# Patient Record
Sex: MALE | Race: WHITE | NOT HISPANIC OR LATINO | Employment: OTHER | URBAN - METROPOLITAN AREA
[De-identification: names, ages, dates, MRNs, and addresses within clinical notes are randomized per-mention and may not be internally consistent; named-entity substitution may affect disease eponyms.]

---

## 2017-10-20 ENCOUNTER — OFFICE VISIT (OUTPATIENT)
Dept: URGENT CARE | Age: 57
End: 2017-10-20
Payer: COMMERCIAL

## 2017-10-20 PROCEDURE — G0382 LEV 3 HOSP TYPE B ED VISIT: HCPCS | Performed by: FAMILY MEDICINE

## 2017-10-21 NOTE — PROGRESS NOTES
Assessment  1  Dog bite of right arm (884 0,E906 0) (O05 998S,J57  0XXA)    Plan  Dog bite of right arm    · Amoxicillin-Pot Clavulanate 875-125 MG Oral Tablet (Augmentin); TAKE 1 TABLET  EVERY 12 HOURS UNTIL GONE    Discussion/Summary  Discussion Summary:   Take Augmentin twice daily for 7 daysdailywound daily with soap and waterfor signs of infectionbite form filled out and faxed to [facility]  Medication Side Effects Reviewed: Possible side effects of new medications were reviewed with the patient/guardian today  Understands and agrees with treatment plan: The treatment plan was reviewed with the patient/guardian  The patient/guardian understands and agrees with the treatment plan   Follow Up Instructions: Follow Up with your Primary Care Provider in 10 days  If your symptoms worsen, go to the nearest [facility] Emergency Department  Chief Complaint  1  Skin Wound  Chief Complaint Free Text Note Form: c/o dog bite to right arm x yesterday AM , after startling dog  reports of cleaning areae with soap and water, tetanus 2013      History of Present Illness  HPI: 60 y/o male presents with dog bite to right forearm  He was running yesterday morning when it was dark out, and a leashed dog was started by him and bit his right forearm  Dog owners information obtained  Dog is up to date on vaccines  Patient's last Td was 2013  He cleaned the wound thoroughly yesterday  Hospital Based Practices Required Assessment:   Pain Assessment   the patient states they do not have pain  Abuse And Domestic Violence Screen    Yes, the patient is safe at home  -- The patient states no one is hurting them  Depression And Suicide Screen  No, the patient has not had thoughts of hurting themself  No, the patient has not felt depressed in the past 7 days  Prefered Language is  english  Review of Systems  Focused-Male:   Constitutional: no fever-- and-- no chills  Integumentary: as noted in HPI     ROS Reviewed:   TATYANA reviewed  Active Problems  1  Cherry angioma (448 1) (I78 1)   2  DM (diabetes mellitus screen) (V77 1) (Z13 1)   3  Encounter for screening for malignant neoplasm of colon (V76 51) (Z12 11)   4  Laboratory examination ordered as part of a routine general medical examination   (V72 62) (Z00 00)   5  Need for prophylactic vaccination and inoculation against influenza (V04 81) (Z23)   6  Neoplasm of scalp, benign (216 4) (D23 4)   7  Screening for hyperlipidemia (V77 91) (Z13 220)   8  Screening PSA (prostate specific antigen) (V76 44) (Z12 5)    Past Medical History  1  History of Blood pressure elevated (401 9) (I10)   2  History of herpes zoster (V12 09) (Z86 19)   3  Need for prophylactic vaccination and inoculation against influenza (V04 81) (Z23)   4  History of Precancerous Melanosis Of The Skin (232 9)  Active Problems And Past Medical History Reviewed: The active problems and past medical history were reviewed and updated today  Family History  Mother    1  Family history of    2  Family history of Leukemia (V16 6)  Father    3  Family history of    4  Family history of Diabetes Mellitus (V18 0)   5  Family history of Hypertension (V17 49)  Family History Reviewed: The family history was reviewed and updated today  Social History   · Being A Social Drinker   · Exercising Regularly   · Never A Smoker  Social History Reviewed: The social history was reviewed and updated today  The social history was reviewed and is unchanged  Surgical History  1  History of Umbilical Hernia Repair  Surgical History Reviewed: The surgical history was reviewed and updated today  Current Meds   1  No Reported Medications Recorded  Medication List Reviewed: The medication list was reviewed and updated today  Allergies  1  Percocet TABS  2  No Known Environmental Allergies   3   No Known Food Allergies    Vitals  Signs   Recorded: [id number] 08:00AM   Temperature: 97 8 F, Temporal  Respiration: 18  Systolic: 481  Diastolic: 90  Height: 5 ft 6 in  Weight: 185 lb   BMI Calculated: 29 86  BSA Calculated: 1 93  O2 Saturation: 99  Pain Scale: 0    Physical Exam    Constitutional   General appearance: No acute distress, well appearing and well nourished  Pulmonary   Respiratory effort: No increased work of breathing or signs of respiratory distress  Auscultation of lungs: Clear to auscultation  Cardiovascular   Auscultation of heart: Normal rate and rhythm, normal S1 and S2, without murmurs  Skin two superfiical puncture wounds noted on right forearm  No erythema or warmth surrounding  No drainage from wounds  No closure required  No lymphangitis  No swelling of arm  Psychiatric   Orientation to person, place and time: Normal     Mood and affect: Normal        Future Appointments    Date/Time Provider Specialty Site   12/04/2017 03:30 PM HARRY Avina   [facility]     Signatures   Electronically signed by : Libra Moreira, [facility]; Oct 20 2017  8:16AM EST                       (Author)    Electronically signed by : Nilda Godwin DO; Oct 20 2017 10:21AM EST                       (Co-author)

## 2017-11-15 ENCOUNTER — GENERIC CONVERSION - ENCOUNTER (OUTPATIENT)
Dept: OTHER | Facility: OTHER | Age: 57
End: 2017-11-15

## 2017-12-04 ENCOUNTER — ALLSCRIPTS OFFICE VISIT (OUTPATIENT)
Dept: OTHER | Facility: OTHER | Age: 57
End: 2017-12-04

## 2018-01-10 NOTE — MISCELLANEOUS
Provider Comments  Provider Comments: The patient did not show up for his appointment today  A message was left for him to reschedule        Signatures   Electronically signed by : HARRY Coreas ; Mar 25 2016  3:07PM EST                       (Author)

## 2018-01-23 VITALS
BODY MASS INDEX: 30.28 KG/M2 | HEIGHT: 66 IN | WEIGHT: 188.38 LBS | HEART RATE: 55 BPM | OXYGEN SATURATION: 97 % | SYSTOLIC BLOOD PRESSURE: 118 MMHG | DIASTOLIC BLOOD PRESSURE: 74 MMHG

## 2018-01-23 NOTE — PROGRESS NOTES
Assessment    1  Encounter for preventive health examination (V70 0) (Z00 00)    Discussion/Summary  health maintenance visit eats an adequate diet Currently, he has an adequate exercise regimen  Prostate cancer screening: the risks and benefits of prostate cancer screening were discussed  Colorectal cancer screening: colorectal cancer screening is current and the next colonoscopy is due 2025  The immunizations will be given as outlined in the orders  The patient was counseled regarding impressions  The treatment plan was reviewed with the patient/guardian  The patient/guardian understands and agrees with the treatment plan      Chief Complaint  Patient presents to office today for a wellness visit  History of Present Illness  HM, Adult Male: The patient is being seen for a health maintenance evaluation  The last health maintenance visit was 2 year(s) ago  Social History: Household members include spouse, 1 daughter(s) and 2 son(s)  He is   Work status: working full time and   The patient has never smoked cigarettes  He reports occasional alcohol use and drinking 5 drinks per week  He is not ready to quit drinking  He has never used illicit drugs  General Health: The patient's health since the last visit is described as good  He has regular dental visits  The patient brushes 2 time(s) a day  He denies vision problems  Vision care includes wearing reading glasses  He denies hearing loss  Hearing is normal  Immunizations status: not up to date The patient needs the following immunization(s): influenza vaccine  Lifestyle:  He consumes a diverse and healthy diet  He is on a diet and is generally adherent  He does not have any weight concerns  He exercises regularly  He exercises 3 or more times per week for 30 or more minutes per session  He does not use tobacco  He consumes alcohol  He reports occasional alcohol use  Alcohol concern:  The patient has no concerns about alcohol abuse  He is not ready to quit drinking  He denies drug use  He has never used illicit drugs  Reproductive health:  the patient is sexually active  Screening: Prostate cancer screening includes last prostate-specific antigen testing 2015  Colorectal cancer screening includes last colonoscopy done 2015  Metabolic screening includes lipid profile performed within the past five years and glucose screening performed 2015  (had labs done for wellness program at work-normal sugar, cholesterol)  Cardiovascular risk factors: no hypertension, no diabetes, no obesity and no tobacco use  Review of Systems    Constitutional: no fever, no recent weight gain, no chills and no recent weight loss  Cardiovascular: no chest pain and no palpitations  Respiratory: no shortness of breath and no cough  Gastrointestinal: no abdominal pain, no constipation and no diarrhea  Genitourinary: no nocturia  The patient presents with complaints of left knee arthralgias  Integumentary: recent basal cell removed on the scalp  Active Problems    1  Cherry angioma (228 01) (D18 01)   2  DM (diabetes mellitus screen) (V77 1) (Z13 1)   3  Encounter for screening for malignant neoplasm of colon (V76 51) (Z12 11)   4  Laboratory examination ordered as part of a routine general medical examination   (V72 62) (Z00 00)   5  Need for prophylactic vaccination and inoculation against influenza (V04 81) (Z23)   6  Neoplasm of scalp, benign (216 4) (D23 4)   7  Screening for hyperlipidemia (V77 91) (Z13 220)   8  Screening PSA (prostate specific antigen) (V76 44) (Z12 5)    Past Medical History    · History of Blood pressure elevated (401 9) (I10)   · History of Dog bite of right arm (884 0,E906 0) (J50 531W,Y27  0XXA)   · History of herpes zoster (V12 09) (Z86 19)   · Need for prophylactic vaccination and inoculation against influenza (V04 81) (Z23)   · History of Precancerous Melanosis Of The Skin (232 9)    Surgical History    · History of Umbilical Hernia Repair    Family History  Mother    · Family history of    · Family history of Leukemia (V16 6)  Father    · Family history of    · Family history of Diabetes Mellitus (V18 0)   · Family history of Hypertension (V17 49)    Social History    · Being A Social Drinker   · Exercising Regularly   · Never A Smoker    Allergies    1  Percocet TABS    2  No Known Environmental Allergies   3  No Known Food Allergies    Vitals   Recorded: [id number] 03:52PM   Heart Rate 55   Systolic 738   Diastolic 74   Height 5 ft 6 in   Weight 188 lb 6 oz   BMI Calculated 30 4   BSA Calculated 1 95   O2 Saturation 97     Physical Exam    Constitutional   General appearance: No acute distress, well appearing and well nourished  Head and Face   Head and face: Normal     Eyes   Conjunctiva and lids: No erythema, swelling or discharge  Ears, Nose, Mouth, and Throat   Otoscopic examination: Tympanic membranes translucent with normal light reflex  Canals patent without erythema  Oropharynx: Normal with no erythema, edema, exudate or lesions  Neck   Neck: Supple, symmetric, trachea midline, no masses  Thyroid: Normal, no thyromegaly  Pulmonary   Respiratory effort: No increased work of breathing or signs of respiratory distress  Auscultation of lungs: Clear to auscultation  Cardiovascular   Auscultation of heart: Normal rate and rhythm, normal S1 and S2, no murmurs  Abdomen   Abdomen: Non-tender, no masses  Lymphatic   Palpation of lymph nodes in neck: No lymphadenopathy  Musculoskeletal   Gait and station: Normal     Skin site of basal cell removed from scalp at vertex is clean     Psychiatric   Orientation to person, place and time: Normal     Mood and affect: Normal        Signatures   Electronically signed by : HARRY Head ; Dec  4 2017  4:12PM EST                       (Author)

## 2018-10-04 ENCOUNTER — OFFICE VISIT (OUTPATIENT)
Dept: INTERNAL MEDICINE CLINIC | Facility: CLINIC | Age: 58
End: 2018-10-04
Payer: COMMERCIAL

## 2018-10-04 VITALS
DIASTOLIC BLOOD PRESSURE: 64 MMHG | WEIGHT: 182.2 LBS | HEART RATE: 61 BPM | SYSTOLIC BLOOD PRESSURE: 124 MMHG | BODY MASS INDEX: 29.41 KG/M2 | OXYGEN SATURATION: 98 %

## 2018-10-04 DIAGNOSIS — Z23 NEED FOR INFLUENZA VACCINATION: ICD-10-CM

## 2018-10-04 DIAGNOSIS — D48.5 NEOPLASM OF UNCERTAIN BEHAVIOR OF SCALP: Primary | ICD-10-CM

## 2018-10-04 PROCEDURE — 90682 RIV4 VACC RECOMBINANT DNA IM: CPT

## 2018-10-04 PROCEDURE — 99213 OFFICE O/P EST LOW 20 MIN: CPT | Performed by: INTERNAL MEDICINE

## 2018-10-04 PROCEDURE — 1036F TOBACCO NON-USER: CPT | Performed by: INTERNAL MEDICINE

## 2018-10-04 PROCEDURE — 90471 IMMUNIZATION ADMIN: CPT

## 2018-10-04 NOTE — PROGRESS NOTES
Assessment/Plan:  Squamous cell vs basal cell CA on scalp  He is already seeing derm in 2 weeks       Problem List Items Addressed This Visit     None      Visit Diagnoses     Neoplasm of uncertain behavior of scalp    -  Primary    Need for influenza vaccination        Relevant Orders    influenza vaccine, 5671-1560, quadrivalent, recombinant, PF, 0 5 mL, for patients 18 yr+ (FLUBLOK) (Completed)            Subjective:      Patient ID: Eriberto Jarvis is a 59 y o  male  HPI   Lump on the right temple that has grown in size over the past 2-3 weeks  He is seeing [facility] in 2 weeks   H/o basal cell cancer on the scalp      The following portions of the patient's history were reviewed and updated as appropriate: allergies, current medications, past family history, past medical history, past social history, past surgical history and problem list     Review of Systems   Skin:        See hpi         Objective:      /64   Pulse 61   Wt 82 6 kg (182 lb 3 2 oz)   SpO2 98%   BMI 29 41 kg/m²          Physical Exam   Constitutional: He is oriented to person, place, and time  He appears well-developed and well-nourished  HENT:   Head: Normocephalic and atraumatic  Right Ear: External ear normal    Left Ear: External ear normal    Mouth/Throat: Oropharynx is clear and moist    Eyes: Conjunctivae are normal    Neck: Neck supple  Cardiovascular: Normal rate, regular rhythm and normal heart sounds  No murmur heard  Pulmonary/Chest: Effort normal and breath sounds normal  No respiratory distress  He has no wheezes  He has no rales  Abdominal: Soft  Bowel sounds are normal  He exhibits no distension and no mass  There is no tenderness  There is no rebound and no guarding  Musculoskeletal: Normal range of motion  Neurological: He is alert and oriented to person, place, and time  Skin: Skin is warm and dry     Round quarter sized irregular mass on the right temple   Psychiatric: He has a normal mood and affect   His behavior is normal  Judgment and thought content normal

## 2019-09-03 ENCOUNTER — OFFICE VISIT (OUTPATIENT)
Dept: INTERNAL MEDICINE CLINIC | Facility: CLINIC | Age: 59
End: 2019-09-03
Payer: COMMERCIAL

## 2019-09-03 VITALS
HEIGHT: 66 IN | WEIGHT: 179 LBS | SYSTOLIC BLOOD PRESSURE: 124 MMHG | DIASTOLIC BLOOD PRESSURE: 64 MMHG | HEART RATE: 61 BPM | BODY MASS INDEX: 28.77 KG/M2 | OXYGEN SATURATION: 98 %

## 2019-09-03 DIAGNOSIS — Z11.59 NEED FOR HEPATITIS C SCREENING TEST: ICD-10-CM

## 2019-09-03 DIAGNOSIS — Z00.00 LABORATORY EXAM ORDERED AS PART OF ROUTINE GENERAL MEDICAL EXAMINATION: ICD-10-CM

## 2019-09-03 DIAGNOSIS — Z12.5 SCREENING PSA (PROSTATE SPECIFIC ANTIGEN): ICD-10-CM

## 2019-09-03 DIAGNOSIS — Z00.00 WELLNESS EXAMINATION: Primary | ICD-10-CM

## 2019-09-03 PROCEDURE — 99396 PREV VISIT EST AGE 40-64: CPT | Performed by: INTERNAL MEDICINE

## 2019-09-03 NOTE — PROGRESS NOTES
Assessment/Plan:  Lump on the chest- pt reassured, suspect lipoma or epidermal cyst  Spasms on the arms- I suspect this is muscular but if they worsen or he develops new symptoms, an MRI would be warranted       Problem List Items Addressed This Visit     None      Visit Diagnoses     Wellness examination    -  Primary    Laboratory exam ordered as part of routine general medical examination        Relevant Orders    CBC and differential    Comprehensive metabolic panel    Lipid panel    Need for hepatitis C screening test        Relevant Orders    Hepatitis C antibody    Screening PSA (prostate specific antigen)        Relevant Orders    PSA, Total Screen            Subjective:      Patient ID: Jimbo Merrill is a 59 y o  male  HPI  Needs a wellness for work  Due for metabolic screening  Up to date with colonoscopy-2015 and 10y repeat recommended  Up to date with eye and dental exams  Regular diet  Regular exercise     The following portions of the patient's history were reviewed and updated as appropriate: allergies, current medications, past medical history, past social history, past surgical history and problem list     Review of Systems   Constitutional: Negative for fatigue, fever and unexpected weight change  HENT: Negative for ear pain, hearing loss, sinus pressure, sinus pain and sore throat  Respiratory: Negative for cough, shortness of breath and wheezing  Cardiovascular: Negative for chest pain, palpitations and leg swelling  Gastrointestinal: Negative for abdominal pain, constipation, diarrhea, nausea and vomiting  Genitourinary: Negative for difficulty urinating, frequency and urgency  Musculoskeletal: Negative for arthralgias and myalgias  A few episodes this month of muscle tightness/spasms in both arms lasting 15 secs  This occurs at rest  No change with activity  Denies neck pain, chest pain, SOB  Worried about MS bec of his family history      Skin:        Small lump under the skin on the middle of the chest   Neurological: Negative for dizziness and headaches  Objective:      /64   Pulse 61   Ht 5' 6" (1 676 m)   Wt 81 2 kg (179 lb)   SpO2 98%   BMI 28 89 kg/m²          Physical Exam   Constitutional: He is oriented to person, place, and time  He appears well-developed and well-nourished  HENT:   Head: Normocephalic and atraumatic  Right Ear: External ear normal    Left Ear: External ear normal    Mouth/Throat: Oropharynx is clear and moist    Eyes: Conjunctivae are normal    Neck: Neck supple  Cardiovascular: Normal rate, regular rhythm and normal heart sounds  No murmur heard  Pulmonary/Chest: Effort normal and breath sounds normal  No respiratory distress  He has no wheezes  He has no rales  Abdominal: Soft  Bowel sounds are normal  He exhibits no distension and no mass  There is no tenderness  There is no rebound and no guarding  Musculoskeletal: Normal range of motion  Neurological: He is alert and oriented to person, place, and time  5/5 in both UE   Skin: Skin is warm and dry  Cherry angiomas on the chest  Soft subdermal mass about 1cm on the center of the chest close to the tip of the sternum   Psychiatric: He has a normal mood and affect   His behavior is normal  Judgment and thought content normal

## 2019-10-24 LAB
ALBUMIN SERPL-MCNC: 4.3 G/DL (ref 3.6–5.1)
ALBUMIN/GLOB SERPL: 1.9 (CALC) (ref 1–2.5)
ALP SERPL-CCNC: 52 U/L (ref 40–115)
ALT SERPL-CCNC: 20 U/L (ref 9–46)
AST SERPL-CCNC: 26 U/L (ref 10–35)
BASOPHILS # BLD AUTO: 42 CELLS/UL (ref 0–200)
BASOPHILS NFR BLD AUTO: 0.8 %
BILIRUB SERPL-MCNC: 0.8 MG/DL (ref 0.2–1.2)
BUN SERPL-MCNC: 17 MG/DL (ref 7–25)
BUN/CREAT SERPL: NORMAL (CALC) (ref 6–22)
CALCIUM SERPL-MCNC: 9.7 MG/DL (ref 8.6–10.3)
CHLORIDE SERPL-SCNC: 103 MMOL/L (ref 98–110)
CHOLEST SERPL-MCNC: 191 MG/DL
CHOLEST/HDLC SERPL: 3.3 (CALC)
CO2 SERPL-SCNC: 30 MMOL/L (ref 20–32)
CREAT SERPL-MCNC: 1.12 MG/DL (ref 0.7–1.25)
EOSINOPHIL # BLD AUTO: 198 CELLS/UL (ref 15–500)
EOSINOPHIL NFR BLD AUTO: 3.8 %
ERYTHROCYTE [DISTWIDTH] IN BLOOD BY AUTOMATED COUNT: 12.9 % (ref 11–15)
GLOBULIN SER CALC-MCNC: 2.3 G/DL (CALC) (ref 1.9–3.7)
GLUCOSE SERPL-MCNC: 86 MG/DL (ref 65–99)
HCT VFR BLD AUTO: 46.8 % (ref 38.5–50)
HCV AB S/CO SERPL IA: 0.04
HCV AB SERPL QL IA: NORMAL
HDLC SERPL-MCNC: 58 MG/DL
HGB BLD-MCNC: 15.1 G/DL (ref 13.2–17.1)
LDLC SERPL CALC-MCNC: 119 MG/DL (CALC)
LYMPHOCYTES # BLD AUTO: 1544 CELLS/UL (ref 850–3900)
LYMPHOCYTES NFR BLD AUTO: 29.7 %
MCH RBC QN AUTO: 29.3 PG (ref 27–33)
MCHC RBC AUTO-ENTMCNC: 32.3 G/DL (ref 32–36)
MCV RBC AUTO: 90.9 FL (ref 80–100)
MONOCYTES # BLD AUTO: 650 CELLS/UL (ref 200–950)
MONOCYTES NFR BLD AUTO: 12.5 %
NEUTROPHILS # BLD AUTO: 2766 CELLS/UL (ref 1500–7800)
NEUTROPHILS NFR BLD AUTO: 53.2 %
NONHDLC SERPL-MCNC: 133 MG/DL (CALC)
PLATELET # BLD AUTO: 212 THOUSAND/UL (ref 140–400)
PMV BLD REES-ECKER: 12.4 FL (ref 7.5–12.5)
POTASSIUM SERPL-SCNC: 4.3 MMOL/L (ref 3.5–5.3)
PROT SERPL-MCNC: 6.6 G/DL (ref 6.1–8.1)
PSA SERPL-MCNC: 0.9 NG/ML
RBC # BLD AUTO: 5.15 MILLION/UL (ref 4.2–5.8)
SL AMB EGFR AFRICAN AMERICAN: 82 ML/MIN/1.73M2
SL AMB EGFR NON AFRICAN AMERICAN: 71 ML/MIN/1.73M2
SODIUM SERPL-SCNC: 139 MMOL/L (ref 135–146)
TRIGL SERPL-MCNC: 57 MG/DL
WBC # BLD AUTO: 5.2 THOUSAND/UL (ref 3.8–10.8)

## 2019-12-07 ENCOUNTER — OFFICE VISIT (OUTPATIENT)
Dept: URGENT CARE | Facility: CLINIC | Age: 59
End: 2019-12-07
Payer: COMMERCIAL

## 2019-12-07 VITALS
BODY MASS INDEX: 28.12 KG/M2 | RESPIRATION RATE: 16 BRPM | DIASTOLIC BLOOD PRESSURE: 88 MMHG | OXYGEN SATURATION: 99 % | HEIGHT: 66 IN | HEART RATE: 88 BPM | SYSTOLIC BLOOD PRESSURE: 140 MMHG | TEMPERATURE: 97.2 F | WEIGHT: 175 LBS

## 2019-12-07 DIAGNOSIS — M54.32 LEFT SIDED SCIATICA: Primary | ICD-10-CM

## 2019-12-07 PROCEDURE — 99213 OFFICE O/P EST LOW 20 MIN: CPT | Performed by: NURSE PRACTITIONER

## 2019-12-07 RX ORDER — CYCLOBENZAPRINE HCL 10 MG
10 TABLET ORAL 3 TIMES DAILY PRN
Qty: 20 TABLET | Refills: 0 | Status: SHIPPED | OUTPATIENT
Start: 2019-12-07 | End: 2019-12-11 | Stop reason: HOSPADM

## 2019-12-07 RX ORDER — PREDNISONE 20 MG/1
20 TABLET ORAL 2 TIMES DAILY WITH MEALS
Qty: 14 TABLET | Refills: 0 | Status: SHIPPED | OUTPATIENT
Start: 2019-12-07 | End: 2019-12-11 | Stop reason: HOSPADM

## 2019-12-07 NOTE — PATIENT INSTRUCTIONS
Alternate ice and heat- 20 minutes on, 20 minutes off  Prednisone 20mg twice daily with food for next 7 days and then as needed  Flexeril 10mg every 8 hours as needed  Do not drive or operate machinery while taking this medication  Gentle stretching exercises  If symptoms persist or worsen, consider physical therapy as discussed  Follow up with PCP in 3-5 days if symptoms are not improving

## 2019-12-07 NOTE — PROGRESS NOTES
[id number] [email or web address]        NAME: Frank Doherty is a 62 y o  male  : 1960    MRN: [id number]  DATE: 2019  TIME: 11:30 AM    Assessment and Plan   1  Left sided sciatica  - predniSONE 20 mg tablet; Take 1 tablet (20 mg total) by mouth 2 (two) times a day with meals for 7 days  Dispense: 14 tablet; Refill: 0  - cyclobenzaprine (FLEXERIL) 10 mg tablet; Take 1 tablet (10 mg total) by mouth 3 (three) times a day as needed for muscle spasms  Dispense: 20 tablet; Refill: 0        Patient Instructions   Alternate ice and heat- 20 minutes on, 20 minutes off  Prednisone 20mg twice daily with food for next 7 days and then as needed  Flexeril 10mg every 8 hours as needed  Do not drive or operate machinery while taking this medication  Gentle stretching exercises  If symptoms persist or worsen, consider physical therapy as discussed  Follow up with PCP in 3-5 days if symptoms are not improving  Chief Complaint     Chief Complaint   Patient presents with    Hip Pain     /buttocks pain x 1 day going down the leg, took advil         History of Present Illness       Here for possible sciatica   Started yesterday  No known injury  Pain from left hip to left lower leg/ankle  Denies numbness or tingling  Took advil  Review of Systems   Review of Systems   Genitourinary: Negative for dysuria and flank pain  Musculoskeletal: Positive for arthralgias  Pain left hip/buttocks into left leg   Skin: Negative for rash and wound  Neurological: Negative for numbness  Current Medications     No current outpatient medications on file      Current Allergies     Allergies as of 2019 - Reviewed 2019   Allergen Reaction Noted    Oxycodone-acetaminophen Nausea Only 2012            The following portions of the patient's history were reviewed and updated as appropriate: allergies, current medications, past family history, past medical history, past social history, past surgical history and problem list      Past Medical History:   Diagnosis Date    Elevated blood pressure reading     Last assessed: 3/24/15       Past Surgical History:   Procedure Laterality Date    KNEE ARTHROSCOPY Left     UMBILICAL HERNIA REPAIR  1985       Family History   Problem Relation Age of Onset    Leukemia Mother     Diabetes Father         Mellitus    Hypertension Father          Medications have been verified  Objective   /88   Pulse 88   Temp (!) 97 2 °F (36 2 °C)   Resp 16   Ht 5' 6" (1 676 m)   Wt 79 4 kg (175 lb)   SpO2 99%   BMI 28 25 kg/m²        Physical Exam     Physical Exam   Constitutional: He appears well-developed  He appears distressed (visibly uncomfortable  )  Cardiovascular: Normal rate  Pulmonary/Chest: Effort normal    Musculoskeletal: He exhibits tenderness (reproducible radicular pain with palpation of left SI notch)  He exhibits no edema or deformity  No point vertebral tenderness  Negative slr b/l     Neurological: He is alert  Skin: Skin is warm and dry  Vitals reviewed

## 2019-12-10 ENCOUNTER — TELEPHONE (OUTPATIENT)
Dept: INTERNAL MEDICINE CLINIC | Facility: CLINIC | Age: 59
End: 2019-12-10

## 2019-12-10 NOTE — TELEPHONE ENCOUNTER
Pt went to urgent care over the weekend with sciatica pain  He said they gave him a steroid and muscle relaxer but he is still having pain  They told him not to take Advil  He wants to know if there is something you can give him or suggest for the pain? Please advise

## 2019-12-11 DIAGNOSIS — M54.40 ACUTE LOW BACK PAIN WITH SCIATICA, SCIATICA LATERALITY UNSPECIFIED, UNSPECIFIED BACK PAIN LATERALITY: Primary | ICD-10-CM

## 2019-12-11 RX ORDER — NAPROXEN 500 MG/1
500 TABLET ORAL 2 TIMES DAILY WITH MEALS
Qty: 30 TABLET | Refills: 0 | Status: SHIPPED | OUTPATIENT
Start: 2019-12-11 | End: 2019-12-23 | Stop reason: SDUPTHER

## 2019-12-11 NOTE — TELEPHONE ENCOUNTER
Advised pt he said it's just the pain no other symptoms - requested I send him the directions on what to do in a letter

## 2019-12-11 NOTE — TELEPHONE ENCOUNTER
If he has not had any relief at all, he can stop the prednisone and the muscle relaxant and start Naproxen which is Aleve, 2 tabs twice a day and Tylenol Arthritis 500mg 4 times a day for a week  Other than the pain, any numbness, weakness in the legs, loss of control of the bowels or bladder, fever, chills?

## 2019-12-11 NOTE — TELEPHONE ENCOUNTER
Naproxen sent 500mg twice a day  Please tell him to take with food  Take Tylenol Arthritis 4 times a day  Schedule visit next week if not better

## 2019-12-12 ENCOUNTER — NURSE TRIAGE (OUTPATIENT)
Dept: PHYSICAL THERAPY | Facility: OTHER | Age: 59
End: 2019-12-12

## 2019-12-12 ENCOUNTER — HOSPITAL ENCOUNTER (OUTPATIENT)
Dept: RADIOLOGY | Facility: HOSPITAL | Age: 59
Discharge: HOME | End: 2019-12-12
Payer: COMMERCIAL

## 2019-12-12 ENCOUNTER — OFFICE VISIT (OUTPATIENT)
Dept: INTERNAL MEDICINE CLINIC | Facility: CLINIC | Age: 59
End: 2019-12-12
Payer: COMMERCIAL

## 2019-12-12 VITALS
HEIGHT: 66 IN | DIASTOLIC BLOOD PRESSURE: 100 MMHG | SYSTOLIC BLOOD PRESSURE: 150 MMHG | BODY MASS INDEX: 30.22 KG/M2 | WEIGHT: 188 LBS | HEART RATE: 78 BPM | OXYGEN SATURATION: 98 %

## 2019-12-12 DIAGNOSIS — M54.42 ACUTE LEFT-SIDED LOW BACK PAIN WITH LEFT-SIDED SCIATICA: ICD-10-CM

## 2019-12-12 DIAGNOSIS — M54.42 ACUTE LEFT-SIDED LOW BACK PAIN WITH LEFT-SIDED SCIATICA: Primary | ICD-10-CM

## 2019-12-12 PROCEDURE — 72200 X-RAY EXAM SI JOINTS: CPT

## 2019-12-12 PROCEDURE — 72100 X-RAY EXAM L-S SPINE 2/3 VWS: CPT

## 2019-12-12 PROCEDURE — 3008F BODY MASS INDEX DOCD: CPT | Performed by: INTERNAL MEDICINE

## 2019-12-12 PROCEDURE — 99213 OFFICE O/P EST LOW 20 MIN: CPT | Performed by: INTERNAL MEDICINE

## 2019-12-12 RX ORDER — TRAMADOL HYDROCHLORIDE 50 MG/1
50 TABLET ORAL EVERY 6 HOURS PRN
Qty: 30 TABLET | Refills: 0 | Status: SHIPPED | OUTPATIENT
Start: 2019-12-12 | End: 2019-12-23 | Stop reason: SDUPTHER

## 2019-12-12 NOTE — PROGRESS NOTES
Assessment/Plan:  Obtain Xrays , may need MRI  Stop prednisone, Flexeril  and start Naproxen and tramadol  Appt with [facility] to be scheduled-patient needs to call himself to get triaged  Re: intermittent bilateral arm spasms, we discussed getting an MRI of his brain and cervical spine  We can add this to the Mri of the lumbar spine when we order it for the back pain     Problem List Items Addressed This Visit     None      Visit Diagnoses     Acute left-sided low back pain with left-sided sciatica    -  Primary    Relevant Medications    traMADol (ULTRAM) 50 mg tablet    Other Relevant Orders    Ambulatory referral to Pain Management    XR spine lumbar 2 or 3 views injury            Subjective:      Patient ID: Charo Cohen is a 62 y o  male  HPI  Here with his wife  He started with left sided low back pain last week, 8/10 in severity radiating to the left buttock down the posterior left leg and calf  +tingling in the calf  He went to urgent care and was prescribed prednisone and Flexeril without relief  He has also tried Advil without relief  The morning before this started, he went to the gym like he usually does and di his routine exercises  He had mild discomfort in his back so did not do back exercises  He hobbles around the house, feels better when he is stooping down  He is not comfortable sitting, laying down on his back  Denies incontinence  Denies injuries to the back    The following portions of the patient's history were reviewed and updated as appropriate: allergies, current medications, past family history, past medical history, past social history and problem list     Review of Systems   Constitutional: Negative for chills and fever  Gastrointestinal: Negative for abdominal pain  Denies incontinence   Genitourinary: Negative for difficulty urinating  Denies incontinence   Musculoskeletal: Positive for back pain          Another episode of bilateral arm spasms a few weeks ago lasting a few seconds  He had a few of these episodes in September and not until recently again  Objective:      /100   Pulse 78   Ht 5' 6" (1 676 m)   Wt 85 3 kg (188 lb)   SpO2 98%   BMI 30 34 kg/m²          Physical Exam   Constitutional:   Uncomfortable sitting, laying down (because of the back pain)   Cardiovascular: Normal rate, regular rhythm and normal heart sounds  No murmur heard  Pulmonary/Chest: Effort normal and breath sounds normal  No stridor  No respiratory distress  He has no wheezes  He has no rales  Abdominal: Soft  He exhibits no distension and no mass  There is no tenderness  There is no guarding  Musculoskeletal:   No tenderness over the lumbar spine and SI joints  No tenderness over the hips bilaterally  + straight leg raise on the left side  5/5 strength with hip flexion, knee extension, foot dorsiflexion bilaterally   Neurological:   Abnormal gait  Stooped posture (to relieve pain in the lower back)   Skin: He is not diaphoretic

## 2019-12-12 NOTE — TELEPHONE ENCOUNTER
Background - Initial Assessment  Clinical complaint: acute low back pain radiating down the left side to the calf  Patient states he has no numbness or tingling  Patient states he has no history of back problems  Patient states he has had no injury or reason for the pain  Date of onset: 12/6/19  Frequency of pain: constant  Quality of pain: sharp    Protocols used: [facility] COMPREHENSIVE SPINE PROGRAM PROTOCOL    This RN did review in detail the Comprehensive Spine Program and what we can provide for their back pain  Patient is agreeable to being triaged by this RN and would like to proceed with Physical Therapy  Referral was placed for Physical Therapy at the [facility] site  Patients information was sent to the  to make evaluation appointment  Patient informed that the PT office will be calling to schedule the appointment  Patient made aware per protocol, a referral would be entered to [facility] based on the triage intake assessment questions  The goal is to take care of patient's emotional well-being in addition to the physical well-being  Patient aware that someone from [address] [place] [address] will reaching out to them with a phone call to discuss options and services if needed  Patient verbalized understanding of referral   Behavioral referral was sent and the patient is aware that they will be receiving a phone call from that office  No further questions and/or concerns were voiced by the patient at this time  Patient states understanding of the referrals that were placed

## 2019-12-16 ENCOUNTER — TELEPHONE (OUTPATIENT)
Dept: INTERNAL MEDICINE CLINIC | Facility: CLINIC | Age: 59
End: 2019-12-16

## 2019-12-16 DIAGNOSIS — M54.42 ACUTE LEFT-SIDED LOW BACK PAIN WITH LEFT-SIDED SCIATICA: Primary | ICD-10-CM

## 2019-12-16 NOTE — TELEPHONE ENCOUNTER
Pt calling in for xray results stating he is still in a good amount of pain (has not worsened or have gotten better since OV); I advised they were not back as of yet and I will call the reading room to have them read - he is asking if you can order an MRI without the results?  Please advise, ty    ** Called reading room  and spoke with Josette Chance she will have them read reports and get them over to you **

## 2019-12-17 ENCOUNTER — EVALUATION (OUTPATIENT)
Dept: PHYSICAL THERAPY | Facility: CLINIC | Age: 59
End: 2019-12-17
Payer: COMMERCIAL

## 2019-12-17 DIAGNOSIS — M54.42 ACUTE LEFT-SIDED LOW BACK PAIN WITH LEFT-SIDED SCIATICA: Primary | ICD-10-CM

## 2019-12-17 PROCEDURE — 97110 THERAPEUTIC EXERCISES: CPT | Performed by: PHYSICAL THERAPIST

## 2019-12-17 PROCEDURE — 97161 PT EVAL LOW COMPLEX 20 MIN: CPT | Performed by: PHYSICAL THERAPIST

## 2019-12-17 PROCEDURE — 97535 SELF CARE MNGMENT TRAINING: CPT | Performed by: PHYSICAL THERAPIST

## 2019-12-17 NOTE — PROGRESS NOTES
PT Evaluation     Today's date: 2019  Patient name: Clarisse Burrell  : 1960  MRN: [id number]  Referring provider: Michaela Luo MD  Dx:   Encounter Diagnosis     ICD-10-CM    1  Acute left-sided low back pain with left-sided sciatica M54 42 Ambulatory referral to Pain Management                  Assessment  Assessment details: The patient presents with s/s consistent with acute LBP with a flexion and mobility preference  The patient demonstrates impairments including limited lumbar ROM, poor core strength and endurance, vertebral hypomobility, and pain with ADLs  The patient would benefit from skilled PT to address his deficits and meet his goals  Impairments: abnormal or restricted ROM, impaired physical strength and pain with function  Understanding of Dx/Px/POC: good   Prognosis: good    Goals  STG: To be completed in 4 weeks  1  The patient will report no more than 4/10 pain during all adls  2  The patient will increase lumbar flexion to fingertips 30 cm from the floor when picking an object off the floor  3  The patient is compliant with his HEP  LTG: To be completed in 8 weeks    1  The patient will report no more than 1/10 pain during all adls  2  The patient will increase lumbar flexion to fingertips 10 cm from the floor when picking an object off the floor  3  The patient will increase LE strength to 4+/5 MMT when ambulating more than 30 minutes         Plan  Patient would benefit from: skilled physical therapy  Planned modality interventions: low level laser therapy  Planned therapy interventions: joint mobilization, manual therapy, neuromuscular re-education, patient education, postural training, self care, strengthening, stretching, therapeutic activities, therapeutic exercise, therapeutic training and home exercise program  Frequency: 2x week  Duration in weeks: 8  Plan of Care beginning date: 2019  Plan of Care expiration date: 2020  Treatment plan discussed with: patient        Subjective Evaluation    History of Present Illness  Date of onset: 2019  Mechanism of injury: Kenyon Guzman is a 62 y o  male who presents with acute LBP with left LE pain in the lateral hip and down into the calf  The patient reports parasthesias in the calf but denies trouble sleeping at night  He notes the pain in his calf gets worse as the day progresses  The patient currently struggles with getting out of the bed in the morning and sitting for more than 30 minutes  PMH is insignificant               Not a recurrent problem   Pain  Current pain ratin  At best pain ratin  At worst pain ratin  Quality: burning, sharp and throbbing  Relieving factors: ice and medications  Aggravating factors: standing  Progression: improved    Social Support    Employment status: working  Treatments  Previous treatment: medication  Current treatment: physical therapy  Patient Goals  Patient goals for therapy: decreased pain and increased motion          Objective     Active Range of Motion     Lumbar   Extension: 15 degrees  with pain    Additional Active Range of Motion Details  Lumbar:  Flexion- fingertips 40 cm from the floor  R Lateral flexion- fingertips 55 cm from the floor*  L Lateral flexion- fingertips 48 cm from the floor    *Pain noted  Mechanical Assessment    Cervical      Thoracic      Lumbar    Standing flexion: repeated movements   Pain intensity: better  Pain level: decreased  Sitting flexion: sustained positions  Pain location: centralized  Pain level: decreased  Standing extension: repeated movements  Pain location: peripheralized  Pain intensity: worse  Pain level: increased    Strength/Myotome Testing     Left Hip   Planes of Motion   Flexion: 4  Abduction: 4-  Adduction: 4-    Right Hip   Planes of Motion   Flexion: 4+  Abduction: 4-  Adduction: 4-    Left Knee   Flexion: 4  Extension: 5    Right Knee   Flexion: 5  Extension: 5    Left Ankle/Foot   Dorsiflexion: 5  Plantar flexion: 4-    Right Ankle/Foot   Dorsiflexion: 5  Plantar flexion: 4    Muscle Activation     Additional Muscle Activation Details  Abd: 4/5 MMT    Tests     Lumbar     Left   Negative crossed SLR, passive SLR and quadrant  Right   Negative crossed SLR, passive SLR and quadrant  Left Hip   Positive WOLFGANG  Negative FADIR  Right Hip   Negative WOLFGANG and FADIR         Flowsheet Rows      Most Recent Value   PT/OT G-Codes   Current Score  52   Projected Score  74             Precautions: none    Dx: Acute LBP with flexion and mobility preference      Manual              Laser: Lumbar                                                                     Exercise Diary  12/17            PPT 5"x10            SKC 20"x4            DKTC 20"x4            Piriformis St  20"x4            Seated Lumbar Flex St  20"x4            Marches with PPT             Clamshells             Reverse pball c PPT                                                                                                                                                                             Modalities

## 2019-12-19 ENCOUNTER — HOSPITAL ENCOUNTER (OUTPATIENT)
Dept: MRI IMAGING | Facility: HOSPITAL | Age: 59
Discharge: HOME | End: 2019-12-19
Payer: COMMERCIAL

## 2019-12-19 DIAGNOSIS — M54.42 ACUTE LEFT-SIDED LOW BACK PAIN WITH LEFT-SIDED SCIATICA: ICD-10-CM

## 2019-12-19 PROCEDURE — 72148 MRI LUMBAR SPINE W/O DYE: CPT

## 2019-12-20 ENCOUNTER — TELEPHONE (OUTPATIENT)
Dept: INTERNAL MEDICINE CLINIC | Facility: CLINIC | Age: 59
End: 2019-12-20

## 2019-12-20 DIAGNOSIS — R25.2: Primary | ICD-10-CM

## 2019-12-20 NOTE — TELEPHONE ENCOUNTER
Actually, it is brain and cervical spine  I do not think he could have had 3 MRIs at the same time anyway  As far as I know, they can only do 2 areas at a time

## 2019-12-20 NOTE — TELEPHONE ENCOUNTER
Patient completed his MRI of spine yesterday and received his results but he thought you wanted him to do an MRI of brain? His symptoms was having sinultaneous numbness on both arms on 4 or 5 different occasions  He is not have symptoms right now but he thought this was discussed and you had already order the MRI of brain so he could have got both MRIs done yesterday?     Please advise

## 2019-12-20 NOTE — TELEPHONE ENCOUNTER
We spoke about getting an MRI of the brain at his visit but I did not realize he wanted to get that done all at one with the lumbar MRI  I can order it   It will be an MRI with intravenous contrast/dye

## 2019-12-20 NOTE — TELEPHONE ENCOUNTER
The patient said he is okay with holding off on it if you are and just deal with his primary issue which is his spine

## 2019-12-23 ENCOUNTER — TELEPHONE (OUTPATIENT)
Dept: INTERNAL MEDICINE CLINIC | Facility: CLINIC | Age: 59
End: 2019-12-23

## 2019-12-23 DIAGNOSIS — M54.40 ACUTE LOW BACK PAIN WITH SCIATICA, SCIATICA LATERALITY UNSPECIFIED, UNSPECIFIED BACK PAIN LATERALITY: ICD-10-CM

## 2019-12-23 DIAGNOSIS — M54.42 ACUTE LEFT-SIDED LOW BACK PAIN WITH LEFT-SIDED SCIATICA: ICD-10-CM

## 2019-12-23 RX ORDER — TRAMADOL HYDROCHLORIDE 50 MG/1
50 TABLET ORAL EVERY 6 HOURS PRN
Qty: 30 TABLET | Refills: 0 | Status: SHIPPED | OUTPATIENT
Start: 2019-12-23 | End: 2020-02-26 | Stop reason: ALTCHOICE

## 2019-12-23 RX ORDER — NAPROXEN 500 MG/1
500 TABLET ORAL 2 TIMES DAILY WITH MEALS
Qty: 30 TABLET | Refills: 0 | Status: SHIPPED | OUTPATIENT
Start: 2019-12-23 | End: 2020-02-26 | Stop reason: ALTCHOICE

## 2019-12-23 NOTE — TELEPHONE ENCOUNTER
Prescriptions    Filled  ID  Written  Drug  QTY  Days  Prescriber  Rx #  Pharmacy *  Refills  Daily Dose  Pymt Type      12/12/2019  1  12/12/2019  TRAMADOL HCL 50 MG TABLET  30 0  7  LE DAVID  [id number]  GIANT ([id number])  0  21 43 MME  Comm Ins  [place]

## 2019-12-23 NOTE — TELEPHONE ENCOUNTER
Pt wants to speak to you directly regarding his sciatica pain  He sees pain management tomorrow but would like to speak to you first  Please call pt

## 2019-12-23 NOTE — TELEPHONE ENCOUNTER
Spoke with his wife in the office  He was doing better then the pain returned today more in the left calf and knee  Rx for naproxen and tramadol sent  He was supposed to come in today but I will see him tomorrow morning instead before he goes to PT  Maybe a DVT?  He has no swelling, redness but will assess tomorrow

## 2019-12-24 ENCOUNTER — OFFICE VISIT (OUTPATIENT)
Dept: PHYSICAL THERAPY | Facility: CLINIC | Age: 59
End: 2019-12-24
Payer: COMMERCIAL

## 2019-12-24 ENCOUNTER — OFFICE VISIT (OUTPATIENT)
Dept: INTERNAL MEDICINE CLINIC | Facility: CLINIC | Age: 59
End: 2019-12-24
Payer: COMMERCIAL

## 2019-12-24 VITALS
HEART RATE: 79 BPM | OXYGEN SATURATION: 98 % | DIASTOLIC BLOOD PRESSURE: 82 MMHG | BODY MASS INDEX: 28.7 KG/M2 | SYSTOLIC BLOOD PRESSURE: 152 MMHG | HEIGHT: 66 IN | WEIGHT: 178.6 LBS

## 2019-12-24 DIAGNOSIS — M54.42 ACUTE LEFT-SIDED LOW BACK PAIN WITH LEFT-SIDED SCIATICA: ICD-10-CM

## 2019-12-24 DIAGNOSIS — M54.40 ACUTE LOW BACK PAIN WITH SCIATICA, SCIATICA LATERALITY UNSPECIFIED, UNSPECIFIED BACK PAIN LATERALITY: Primary | ICD-10-CM

## 2019-12-24 DIAGNOSIS — M54.42 ACUTE LEFT-SIDED LOW BACK PAIN WITH LEFT-SIDED SCIATICA: Primary | ICD-10-CM

## 2019-12-24 PROCEDURE — 97140 MANUAL THERAPY 1/> REGIONS: CPT | Performed by: PHYSICAL THERAPIST

## 2019-12-24 PROCEDURE — 97110 THERAPEUTIC EXERCISES: CPT | Performed by: PHYSICAL THERAPIST

## 2019-12-24 PROCEDURE — 1036F TOBACCO NON-USER: CPT | Performed by: INTERNAL MEDICINE

## 2019-12-24 PROCEDURE — 97112 NEUROMUSCULAR REEDUCATION: CPT | Performed by: PHYSICAL THERAPIST

## 2019-12-24 PROCEDURE — 99214 OFFICE O/P EST MOD 30 MIN: CPT | Performed by: INTERNAL MEDICINE

## 2019-12-24 PROCEDURE — 3008F BODY MASS INDEX DOCD: CPT | Performed by: INTERNAL MEDICINE

## 2019-12-24 RX ORDER — GABAPENTIN 100 MG/1
100 CAPSULE ORAL
Qty: 30 CAPSULE | Refills: 1 | Status: SHIPPED | OUTPATIENT
Start: 2019-12-24 | End: 2020-01-07 | Stop reason: SDUPTHER

## 2019-12-24 NOTE — PROGRESS NOTES
Daily Note     Today's date: 2019  Patient name: Loraine Riley  : 1960  MRN: [id number]  Referring provider: Bucky Richey MD  Dx:   Encounter Diagnosis     ICD-10-CM    1  Acute left-sided low back pain with left-sided sciatica M54 42                   Subjective: The patient returns after IE reporting mild pain until Saturday when his pain significantly escalated  He notes minimal to no pain while sitting and 5/10 pain when standing  He also reports burning and tingling in the left calf and medial ankle  Objective: See treatment diary below      Assessment: The patient demonstrated fair tolerance to core strengthening program noting increased calf pain with marches and hamstring stretch  He noted improved calf pain with gastroc stretch and was able to finish the hamstring stretch without increased pain  Added clamshells to the patient's HEP  Plan: Continue per plan of care  Assess patient's response to treatment NV        Precautions: none    Dx: Acute LBP with flexion and mobility preference      Manual             Laser: Lumbar  5400J                                                                   Exercise Diary             PPT 5"x10 5"x15           SKC 20"x4 20"x4           DKTC 20"x4 20"x4           Piriformis St  20"x4 20"x4           Seated Lumbar Flex St  20"x4 20"x4           Marches with PPT  2x10*           Clamshells  RTB 3x10           Reverse pball c PPT  2x10            HS St    15"x3*           Gastroc St    15"x3                                                                                                                                                 Modalities

## 2019-12-24 NOTE — PROGRESS NOTES
Assessment/Plan:  Start gabapentin at night at 100mg and may increase up to 300mg at night  Cont Naproxen and tramadol  Cont PT       Problem List Items Addressed This Visit     None      Visit Diagnoses     Acute low back pain with sciatica, sciatica laterality unspecified, unspecified back pain laterality    -  Primary    Relevant Medications    gabapentin (NEURONTIN) 100 mg capsule    Acute left-sided low back pain with left-sided sciatica        Relevant Medications    gabapentin (NEURONTIN) 100 mg capsule            Subjective:      Patient ID: Enio Jain is a 62 y o  male  HPI  Her with his wife  He was seen 2 weeks ago for severe pain in the left buttock/hip down the back of the right thigh and calf  Xrays and MRI unremarkable  He has been on steroids, NSAIDs, muscle relaxants and tramadol with some relief  He seemed to be doing better over the weekend but got bad again yesterday  He had a PT eval and is going again today  There is a burning feeling in his calf that is constant  There is a tingling in the back of the right thigh that comes and goes  He has these symptoms even at rest when he is lying down  The pain gets worse when he gets up  There is no bowel or bladder incontinence  He denies LE weakness, saddle anesthesia  No LE edema  No DVTs    The following portions of the patient's history were reviewed and updated as appropriate: allergies, past family history, past medical history, past social history, past surgical history and problem list     Review of Systems   Constitutional: Negative for chills and fever  Gastrointestinal: Negative for abdominal pain  Genitourinary: Negative for difficulty urinating  Musculoskeletal: Negative for back pain  See hpi   Skin: Negative for rash  Objective:      /82   Pulse 79   Ht 5' 6" (1 676 m)   Wt 81 kg (178 lb 9 6 oz)   SpO2 98%   BMI 28 83 kg/m²          Physical Exam   Constitutional: No distress     Abdominal: Soft  He exhibits no distension and no mass  There is no tenderness  There is no rebound and no guarding  Musculoskeletal: Normal range of motion  He exhibits no edema, tenderness or deformity  Antalgic gait  5/5 in both LE  -SLR  Slightly limited left hip ROM because of pain, no tenderness at the hips   Skin: He is not diaphoretic

## 2019-12-30 ENCOUNTER — OFFICE VISIT (OUTPATIENT)
Dept: PHYSICAL THERAPY | Facility: CLINIC | Age: 59
End: 2019-12-30
Payer: COMMERCIAL

## 2019-12-30 DIAGNOSIS — M54.42 ACUTE LEFT-SIDED LOW BACK PAIN WITH LEFT-SIDED SCIATICA: Primary | ICD-10-CM

## 2019-12-30 PROCEDURE — 97112 NEUROMUSCULAR REEDUCATION: CPT | Performed by: PHYSICAL THERAPIST

## 2019-12-30 PROCEDURE — 97110 THERAPEUTIC EXERCISES: CPT | Performed by: PHYSICAL THERAPIST

## 2019-12-30 NOTE — PROGRESS NOTES
Daily Note     Today's date: 2019  Patient name: Bella Arguello  : 1960  MRN: [id number]  Referring provider: Vanessa Cervantes MD  Dx:   No diagnosis found  Subjective: The patient reports little to no pain in the back and hip stating most of his symptoms are in the right calf  He currently notes 5/10 calf pain stating the pain worsens as the day progresses  Objective: See treatment diary below      Assessment: The patient demonstrated improved tolerance to core strengthening  No change in symptoms at the end of the treatment; however, patient continues to note calf stretching decreases his pain  Plan: Continue per plan of care  Assess patient's response to treatment NV        Precautions: none    Dx: Acute LBP with flexion and mobility preference      Manual            Laser: Lumbar  5400J 5400J                                                                  Exercise Diary            PPT 5"x10 5"x15 5"x15          SKC 20"x4 20"x4 20"x4          DKTC 20"x4 20"x4 20"x4          Piriformis St  20"x4 20"x4 20"x4          Seated Lumbar Flex St  20"x4 20"x4 20"x4          Marches with PPT  2x10* 2x10          Clamshells  RTB 3x10 RTB 3x10          Reverse pball c PPT  2x10  2x10          HS St    15"x3* np          Gastroc St    15"x3 20"x4                                                                                                                                                Modalities

## 2020-01-03 ENCOUNTER — OFFICE VISIT (OUTPATIENT)
Dept: PHYSICAL THERAPY | Facility: CLINIC | Age: 60
End: 2020-01-03
Payer: COMMERCIAL

## 2020-01-03 DIAGNOSIS — M54.42 ACUTE LEFT-SIDED LOW BACK PAIN WITH LEFT-SIDED SCIATICA: Primary | ICD-10-CM

## 2020-01-03 PROCEDURE — 97110 THERAPEUTIC EXERCISES: CPT | Performed by: PHYSICAL THERAPIST

## 2020-01-03 PROCEDURE — 97112 NEUROMUSCULAR REEDUCATION: CPT | Performed by: PHYSICAL THERAPIST

## 2020-01-03 PROCEDURE — 97140 MANUAL THERAPY 1/> REGIONS: CPT | Performed by: PHYSICAL THERAPIST

## 2020-01-03 NOTE — PROGRESS NOTES
Daily Note     Today's date: 1/3/2020  Patient name: Mornea Mcdonough  : 1960  MRN: [id number]  Referring provider: Prachi Slade MD  Dx:   Encounter Diagnosis     ICD-10-CM    1  Acute left-sided low back pain with left-sided sciatica M54 42                   Subjective: The patient reports no significant changes since last visit  He notes pain mostly in the calf  Objective: See treatment diary below      Assessment: The patient demonstrated positive sciatic nerve test and good tolerance to sciatic nerve glide  Added exercise to HEP  Plan: Continue per plan of care  Assess patient's response to sciatic nerve glide NV        Precautions: none    Dx: Acute LBP with flexion and mobility preference      Manual           Laser: Lumbar  5400J 5400J 5400J                                                                 Exercise Diary           PPT 5"x10 5"x15 5"x15 5"x15         SKC 20"x4 20"x4 20"x4 20"x4         DKTC 20"x4 20"x4 20"x4 20"x4         Piriformis St  20"x4 20"x4 20"x4 20"x4         Seated Lumbar Flex St  20"x4 20"x4 20"x4          Marches with PPT  2x10* 2x10 1x10*         Clamshells  RTB 3x10 RTB 3x10 RTB 3x12         Reverse pball c PPT  2x10  2x10 2x12         HS St    15"x3* np 15"x3         Gastroc St    15"x3 20"x4 20"x4         Seated Sciatic N Glide    2x10                                                                                                                                  Modalities

## 2020-01-07 ENCOUNTER — OFFICE VISIT (OUTPATIENT)
Dept: INTERNAL MEDICINE CLINIC | Facility: CLINIC | Age: 60
End: 2020-01-07
Payer: COMMERCIAL

## 2020-01-07 VITALS
OXYGEN SATURATION: 98 % | SYSTOLIC BLOOD PRESSURE: 126 MMHG | BODY MASS INDEX: 29.47 KG/M2 | WEIGHT: 183.4 LBS | DIASTOLIC BLOOD PRESSURE: 90 MMHG | HEIGHT: 66 IN | HEART RATE: 78 BPM

## 2020-01-07 DIAGNOSIS — R03.0 ELEVATED BLOOD PRESSURE READING WITHOUT DIAGNOSIS OF HYPERTENSION: ICD-10-CM

## 2020-01-07 DIAGNOSIS — M54.40 ACUTE LOW BACK PAIN WITH SCIATICA, SCIATICA LATERALITY UNSPECIFIED, UNSPECIFIED BACK PAIN LATERALITY: Primary | ICD-10-CM

## 2020-01-07 PROCEDURE — 3008F BODY MASS INDEX DOCD: CPT | Performed by: INTERNAL MEDICINE

## 2020-01-07 PROCEDURE — 99214 OFFICE O/P EST MOD 30 MIN: CPT | Performed by: INTERNAL MEDICINE

## 2020-01-07 PROCEDURE — 1036F TOBACCO NON-USER: CPT | Performed by: INTERNAL MEDICINE

## 2020-01-07 RX ORDER — GABAPENTIN 100 MG/1
200 CAPSULE ORAL
Qty: 60 CAPSULE | Refills: 2 | Status: SHIPPED | OUTPATIENT
Start: 2020-01-07 | End: 2020-02-26 | Stop reason: ALTCHOICE

## 2020-01-07 NOTE — PROGRESS NOTES
Assessment/Plan:  Residual left calf pain-Continue PT  Continue gabapentin 200mg HS  Start weaning off Naproxen  Elevated BP- BP better when rechecked  Limit Naproxen use     Problem List Items Addressed This Visit     None      Visit Diagnoses     Acute low back pain with sciatica, sciatica laterality unspecified, unspecified back pain laterality    -  Primary    Relevant Medications    gabapentin (NEURONTIN) 100 mg capsule    Elevated blood pressure reading without diagnosis of hypertension                Subjective:      Patient ID: Caroline Prabhakar is a 62 y o  male  HPI  Severe left sided low back/ hip pain down the left leg that started 4 weeks ago  Degenerative changes on X Rays of lumbar spine and SI joints, MRI also showed multilevel degenerative changes  No response to prednisone, , tramadol, Flexeril  He has been going to PT and taking Naproxen  Gabapentin started 2 weeks ago for burning sensation in the left calf when he walks  He has had significant relief of the burning and the pain in general over the past few weeks and since gabapentin was added   He now only has the burning sensation in the calf 5/10 and mild tingling in the foot  Denies weakness  Denies bowel and bladder incontinence  He is still using a cane    The following portions of the patient's history were reviewed and updated as appropriate: allergies, current medications, past medical history, past social history, past surgical history and problem list     Review of Systems   Constitutional: Negative for chills and fever  HENT: Negative for congestion, rhinorrhea and sinus pressure  Eyes: Negative for visual disturbance  Respiratory: Negative for cough and shortness of breath  Cardiovascular: Negative for chest pain, palpitations and leg swelling  Gastrointestinal: Negative for abdominal pain  Genitourinary: Negative for difficulty urinating  Musculoskeletal: Negative for back pain     Neurological: Negative for dizziness, weakness and numbness  Objective:      /90   Pulse 78   Ht 5' 6" (1 676 m)   Wt 83 2 kg (183 lb 6 4 oz)   SpO2 98%   BMI 29 60 kg/m²          Physical Exam   Constitutional: He is oriented to person, place, and time  No distress  HENT:   Head: Normocephalic and atraumatic  Eyes: Conjunctivae are normal    Cardiovascular: Normal rate, regular rhythm and normal heart sounds  No murmur heard  Pulmonary/Chest: Effort normal and breath sounds normal  No respiratory distress  He has no wheezes  He has no rales  Abdominal: Soft  He exhibits no distension and no mass  There is no tenderness  There is no rebound and no guarding  Musculoskeletal: He exhibits no tenderness (none over the lumbar spine, SI joints, calves; -SLR; 5/5 in both LE)  Neurological: He is alert and oriented to person, place, and time  Skin: Skin is warm and dry  He is not diaphoretic  Psychiatric: He has a normal mood and affect   His behavior is normal  Judgment and thought content normal

## 2020-01-09 ENCOUNTER — OFFICE VISIT (OUTPATIENT)
Dept: PHYSICAL THERAPY | Facility: CLINIC | Age: 60
End: 2020-01-09
Payer: COMMERCIAL

## 2020-01-09 DIAGNOSIS — M54.42 ACUTE LEFT-SIDED LOW BACK PAIN WITH LEFT-SIDED SCIATICA: Primary | ICD-10-CM

## 2020-01-09 PROCEDURE — 97112 NEUROMUSCULAR REEDUCATION: CPT | Performed by: PHYSICAL THERAPIST

## 2020-01-09 PROCEDURE — 97110 THERAPEUTIC EXERCISES: CPT | Performed by: PHYSICAL THERAPIST

## 2020-01-09 NOTE — PROGRESS NOTES
Daily Note     Today's date: 2020  Patient name: Ila Graves  : 1960  MRN: [id number]  Referring provider: Jignesh Patricia MD  Dx:   No diagnosis found  Subjective: The patient reports improvement in symptoms with sciatic nerve glides at home  He notes 1-2/10 hip pain and no LBP today  Objective: See treatment diary below      Assessment: The patient responded well to IASTM to the piriformis  Discharged the marches and added bridges without onset of symptoms  Plan: Continue per plan of care  Assess patient's response to changes NV        Precautions: none    Dx: Acute LBP with flexion and mobility preference      Manual          Laser: Lumbar  5400J 5400J 5400J np        IASTM: L piriformis     10 min        Hip IR/ER PROM     20"x5 ea                                      Exercise Diary          PPT 5"x10 5"x15 5"x15 5"x15 5"x20        SKC 20"x4 20"x4 20"x4 20"x4 20"x4        DKTC 20"x4 20"x4 20"x4 20"x4 20"x3        Piriformis St  20"x4 20"x4 20"x4 20"x4 20"x4        Seated Lumbar Flex St  20"x4 20"x4 20"x4 20"x4 np        Marches with PPT  2x10* 2x10 1x10* np        Clamshells  RTB 3x10 RTB 3x10 RTB 3x12 RTB 3x15        Reverse pball c PPT  2x10  2x10 2x12 2x12        HS St    15"x3* np 15"x3 15"x3        Gastroc St    15"x3 20"x4 20"x4 np        Seated Sciatic N Glide    2x10 3x10        Bridges     2x10                                                                                                                    Modalities

## 2020-01-10 ENCOUNTER — APPOINTMENT (OUTPATIENT)
Dept: PHYSICAL THERAPY | Facility: CLINIC | Age: 60
End: 2020-01-10
Payer: COMMERCIAL

## 2020-01-17 ENCOUNTER — OFFICE VISIT (OUTPATIENT)
Dept: PHYSICAL THERAPY | Facility: CLINIC | Age: 60
End: 2020-01-17
Payer: COMMERCIAL

## 2020-01-17 ENCOUNTER — APPOINTMENT (OUTPATIENT)
Dept: PHYSICAL THERAPY | Facility: CLINIC | Age: 60
End: 2020-01-17
Payer: COMMERCIAL

## 2020-01-17 DIAGNOSIS — M54.42 ACUTE LEFT-SIDED LOW BACK PAIN WITH LEFT-SIDED SCIATICA: Primary | ICD-10-CM

## 2020-01-17 PROCEDURE — 97110 THERAPEUTIC EXERCISES: CPT | Performed by: PHYSICAL THERAPIST

## 2020-01-17 PROCEDURE — 97140 MANUAL THERAPY 1/> REGIONS: CPT | Performed by: PHYSICAL THERAPIST

## 2020-01-17 PROCEDURE — 97112 NEUROMUSCULAR REEDUCATION: CPT | Performed by: PHYSICAL THERAPIST

## 2020-01-17 PROCEDURE — 97530 THERAPEUTIC ACTIVITIES: CPT | Performed by: PHYSICAL THERAPIST

## 2020-01-17 NOTE — PROGRESS NOTES
Daily Note     Today's date: 2020  Patient name: Deatrice Hamman  : 1960  MRN: [id number]  Referring provider: Eugene Ram MD  Dx:   Encounter Diagnosis     ICD-10-CM    1  Acute left-sided low back pain with left-sided sciatica M54 42        Start Time: 0800  Stop Time: 0850  Total time in clinic (min): 50 minutes    Subjective: The patient reports significant improvement in symptoms noting only mild calf pain at the end of the day  He presented without an AD and a more normalized gait today  Objective: See treatment diary below      Assessment: The patient tolerated core progressions well  Improved piriformis tone noted during manuals  Patient still limited in hip IR  Added exercise bike without onset of symptoms  Plan: Continue per plan of care  [facility] to [facility]        Precautions: none    Dx: Acute LBP with flexion and mobility preference      Manual         Laser: Lumbar  5400J 5400J 5400J np np       IASTM: L piriformis     10 min 10 min       Hip IR/ER PROM     20"x5 ea 20"x5 ea                                     Exercise Diary         PPT 5"x10 5"x15 5"x15 5"x15 5"x20 5"x20       SKC 20"x4 20"x4 20"x4 20"x4 20"x4 20"x3       DKTC 20"x4 20"x4 20"x4 20"x4 20"x3 np       Piriformis St  20"x4 20"x4 20"x4 20"x4 20"x4 20"x4       Seated Lumbar Flex St  20"x4 20"x4 20"x4 20"x4 np        Marches with PPT  2x10* 2x10 1x10* np        Clamshells  RTB 3x10 RTB 3x10 RTB 3x12 RTB 3x15 RTB 3x18       Reverse pball c PPT  2x10  2x10 2x12 2x12 3x10       HS St    15"x3* np 15"x3 15"x3 15"x3       Gastroc St    15"x3 20"x4 20"x4 np        Seated Sciatic N Glide    2x10 3x10 3x10       Bridges     2x10 3x10       Bike      10 min                                                                                                      Modalities
[id number] Comprehensive

## 2020-01-28 ENCOUNTER — EVALUATION (OUTPATIENT)
Dept: PHYSICAL THERAPY | Facility: CLINIC | Age: 60
End: 2020-01-28
Payer: COMMERCIAL

## 2020-01-28 DIAGNOSIS — M54.42 ACUTE LEFT-SIDED LOW BACK PAIN WITH LEFT-SIDED SCIATICA: Primary | ICD-10-CM

## 2020-01-28 PROCEDURE — 97112 NEUROMUSCULAR REEDUCATION: CPT | Performed by: PHYSICAL THERAPIST

## 2020-01-28 PROCEDURE — 97140 MANUAL THERAPY 1/> REGIONS: CPT | Performed by: PHYSICAL THERAPIST

## 2020-01-28 PROCEDURE — 97110 THERAPEUTIC EXERCISES: CPT | Performed by: PHYSICAL THERAPIST

## 2020-01-28 NOTE — PROGRESS NOTES
Daily Note     Today's date: 2020  Patient name: Boris Ahumada  : 1960  MRN: [id number]  Referring provider: Yogesh Rojas MD  Dx:   Encounter Diagnosis     ICD-10-CM    1  Acute left-sided low back pain with left-sided sciatica M54 42                   Subjective: The patient reports no pain in the past week and a half  He notes improved paraesthesias in the foot  The patient feels ready for discharge to [facility]  Objective: See treatment diary below      Assessment: The patient demonstrated good tolerance to progressions  Discussed prognosis and prevention and updated his HEP for home  Plan: Patient is discharged from [facility]        Precautions: none    Dx: Acute LBP with flexion and mobility preference      Manual        Laser: Lumbar  5400J 5400J 5400J np np       IASTM: L piriformis     10 min 10 min 10 min      Hip IR/ER PROM     20"x5 ea 20"x5 ea 20"x5 ea                                    Exercise Diary        PPT 5"x10 5"x15 5"x15 5"x15 5"x20 5"x20 5"x20      SKC 20"x4 20"x4 20"x4 20"x4 20"x4 20"x3 20"x3      DKTC 20"x4 20"x4 20"x4 20"x4 20"x3 np       Piriformis St  20"x4 20"x4 20"x4 20"x4 20"x4 20"x4 20"x4      Seated Lumbar Flex St  20"x4 20"x4 20"x4 20"x4 np        Marches with PPT  2x10* 2x10 1x10* np        Clamshells  RTB 3x10 RTB 3x10 RTB 3x12 RTB 3x15 RTB 3x18 GTB 3x12      Reverse pball c PPT  2x10  2x10 2x12 2x12 3x10 3x10      HS St    15"x3* np 15"x3 15"x3 15"x3 20"x3      Gastroc St    15"x3 20"x4 20"x4 np        Seated Sciatic N Glide    2x10 3x10 3x10 3x10      Bridges     2x10 3x10 3x10      Bike      10 min                                                                                                      Modalities

## 2020-02-26 ENCOUNTER — OFFICE VISIT (OUTPATIENT)
Dept: INTERNAL MEDICINE CLINIC | Facility: CLINIC | Age: 60
End: 2020-02-26
Payer: COMMERCIAL

## 2020-02-26 VITALS
BODY MASS INDEX: 29.96 KG/M2 | HEIGHT: 66 IN | OXYGEN SATURATION: 98 % | WEIGHT: 186.4 LBS | SYSTOLIC BLOOD PRESSURE: 120 MMHG | HEART RATE: 68 BPM | DIASTOLIC BLOOD PRESSURE: 64 MMHG

## 2020-02-26 DIAGNOSIS — Z13.220 SCREENING, LIPID: ICD-10-CM

## 2020-02-26 DIAGNOSIS — Z00.00 LABORATORY EXAM ORDERED AS PART OF ROUTINE GENERAL MEDICAL EXAMINATION: ICD-10-CM

## 2020-02-26 DIAGNOSIS — Z12.5 SCREENING PSA (PROSTATE SPECIFIC ANTIGEN): ICD-10-CM

## 2020-02-26 DIAGNOSIS — M54.32 SCIATICA OF LEFT SIDE: Primary | ICD-10-CM

## 2020-02-26 DIAGNOSIS — Z13.1 SCREENING FOR DIABETES MELLITUS: ICD-10-CM

## 2020-02-26 PROCEDURE — 1036F TOBACCO NON-USER: CPT | Performed by: INTERNAL MEDICINE

## 2020-02-26 PROCEDURE — 99213 OFFICE O/P EST LOW 20 MIN: CPT | Performed by: INTERNAL MEDICINE

## 2020-02-26 PROCEDURE — 3008F BODY MASS INDEX DOCD: CPT | Performed by: INTERNAL MEDICINE

## 2020-02-26 NOTE — PROGRESS NOTES
Assessment/Plan:  Sciatica resolved  Back to normal activity  Wellness to be done in November  He will have labs done before the visit  BMI Counseling: Body mass index is 30 09 kg/m²  The BMI is above normal  Exercise recommendations include exercising 3-5 times per week  Problem List Items Addressed This Visit     None      Visit Diagnoses     Sciatica of left side    -  Primary    Screening for diabetes mellitus        Relevant Orders    Comprehensive metabolic panel    Screening PSA (prostate specific antigen)        Relevant Orders    CBC    Screening, lipid        Relevant Orders    Lipid panel    Laboratory exam ordered as part of routine general medical examination        Relevant Orders    PSA, Total Screen    Lipid panel    CBC    Comprehensive metabolic panel            Subjective:      Patient ID: Maryanne Juarez is a 62 y o  male  HPI  He had severe left sided low back/hip pain, left calf pain in December  Xray, MRI revealed degenerative changes  He failed prednisone tramadol and Flexeril   Improved with PT and with gabapentin and Naproxen  Symptoms are compeltely gone , resolved  He denies having any low back pain, numbness in the calves  He is back to exercising in the gym   Also reports 2 episodes of spasms in the UE which have not recurred    The following portions of the patient's history were reviewed and updated as appropriate: allergies, past family history, past medical history, past social history, past surgical history and problem list     Review of Systems   Constitutional: Negative for fatigue, fever and unexpected weight change  HENT: Negative for congestion, sinus pressure, sinus pain and sore throat  Respiratory: Negative for cough, shortness of breath and wheezing  Cardiovascular: Negative for chest pain, palpitations and leg swelling  Gastrointestinal: Negative for abdominal pain, constipation, diarrhea, nausea and vomiting     Genitourinary: Negative for difficulty urinating  Musculoskeletal: Negative for arthralgias, back pain and myalgias  Neurological: Negative for numbness  Objective:      /64   Pulse 68   Ht 5' 6" (1 676 m)   Wt 84 6 kg (186 lb 6 4 oz)   SpO2 98%   BMI 30 09 kg/m²          Physical Exam   Constitutional: He is oriented to person, place, and time  He appears well-developed and well-nourished  HENT:   Head: Normocephalic and atraumatic  Right Ear: External ear normal    Left Ear: External ear normal    Mouth/Throat: Oropharynx is clear and moist    Eyes: Conjunctivae are normal    Neck: Neck supple  Cardiovascular: Normal rate, regular rhythm and normal heart sounds  No murmur heard  Pulmonary/Chest: Effort normal and breath sounds normal  No respiratory distress  He has no wheezes  He has no rales  Abdominal: Soft  He exhibits no distension and no mass  There is no tenderness  There is no rebound and no guarding  Neurological: He is alert and oriented to person, place, and time  Gait normal    5/5 in both LE  -SLR   Skin: Skin is warm and dry  Psychiatric: He has a normal mood and affect   His behavior is normal  Judgment and thought content normal

## 2020-11-10 ENCOUNTER — TELEMEDICINE (OUTPATIENT)
Dept: INTERNAL MEDICINE CLINIC | Facility: CLINIC | Age: 60
End: 2020-11-10
Payer: COMMERCIAL

## 2020-11-10 VITALS — WEIGHT: 178 LBS | BODY MASS INDEX: 28.61 KG/M2 | HEIGHT: 66 IN

## 2020-11-10 DIAGNOSIS — Z20.828 EXPOSURE TO SARS-ASSOCIATED CORONAVIRUS: Primary | ICD-10-CM

## 2020-11-10 PROCEDURE — 99213 OFFICE O/P EST LOW 20 MIN: CPT | Performed by: NURSE PRACTITIONER

## 2020-11-10 RX ORDER — SODIUM FLUORIDE 6 MG/ML
PASTE, DENTIFRICE DENTAL
COMMUNITY
Start: 2020-10-16

## 2020-11-11 DIAGNOSIS — Z20.828 EXPOSURE TO SARS-ASSOCIATED CORONAVIRUS: ICD-10-CM

## 2020-11-11 PROCEDURE — U0003 INFECTIOUS AGENT DETECTION BY NUCLEIC ACID (DNA OR RNA); SEVERE ACUTE RESPIRATORY SYNDROME CORONAVIRUS 2 (SARS-COV-2) (CORONAVIRUS DISEASE [COVID-19]), AMPLIFIED PROBE TECHNIQUE, MAKING USE OF HIGH THROUGHPUT TECHNOLOGIES AS DESCRIBED BY CMS-2020-01-R: HCPCS | Performed by: NURSE PRACTITIONER

## 2020-11-12 PROBLEM — Z20.828 EXPOSURE TO SARS-ASSOCIATED CORONAVIRUS: Status: ACTIVE | Noted: 2020-11-12

## 2020-11-13 ENCOUNTER — TELEPHONE (OUTPATIENT)
Dept: INTERNAL MEDICINE CLINIC | Facility: CLINIC | Age: 60
End: 2020-11-13

## 2020-11-13 LAB — SARS-COV-2 RNA SPEC QL NAA+PROBE: NOT DETECTED

## 2020-12-01 ENCOUNTER — OFFICE VISIT (OUTPATIENT)
Dept: INTERNAL MEDICINE CLINIC | Facility: CLINIC | Age: 60
End: 2020-12-01
Payer: COMMERCIAL

## 2020-12-01 ENCOUNTER — TELEPHONE (OUTPATIENT)
Dept: INTERNAL MEDICINE CLINIC | Facility: CLINIC | Age: 60
End: 2020-12-01

## 2020-12-01 VITALS
OXYGEN SATURATION: 97 % | SYSTOLIC BLOOD PRESSURE: 116 MMHG | DIASTOLIC BLOOD PRESSURE: 74 MMHG | HEART RATE: 50 BPM | HEIGHT: 66 IN | BODY MASS INDEX: 28.93 KG/M2 | WEIGHT: 180 LBS

## 2020-12-01 DIAGNOSIS — Z00.00 LABORATORY EXAM ORDERED AS PART OF ROUTINE GENERAL MEDICAL EXAMINATION: ICD-10-CM

## 2020-12-01 DIAGNOSIS — Z00.00 WELLNESS EXAMINATION: Primary | ICD-10-CM

## 2020-12-01 DIAGNOSIS — Z12.5 SCREENING PSA (PROSTATE SPECIFIC ANTIGEN): ICD-10-CM

## 2020-12-01 PROBLEM — Z20.828 EXPOSURE TO SARS-ASSOCIATED CORONAVIRUS: Status: RESOLVED | Noted: 2020-11-12 | Resolved: 2020-12-01

## 2020-12-01 PROCEDURE — 3008F BODY MASS INDEX DOCD: CPT | Performed by: INTERNAL MEDICINE

## 2020-12-01 PROCEDURE — 99396 PREV VISIT EST AGE 40-64: CPT | Performed by: INTERNAL MEDICINE

## 2020-12-01 PROCEDURE — 1036F TOBACCO NON-USER: CPT | Performed by: INTERNAL MEDICINE

## 2020-12-30 ENCOUNTER — LAB (OUTPATIENT)
Dept: LAB | Age: 60
End: 2020-12-30
Payer: COMMERCIAL

## 2020-12-30 LAB
ALBUMIN SERPL BCP-MCNC: 3.9 G/DL (ref 3.5–5)
ALP SERPL-CCNC: 62 U/L (ref 46–116)
ALT SERPL W P-5'-P-CCNC: 27 U/L (ref 12–78)
ANION GAP SERPL CALCULATED.3IONS-SCNC: 4 MMOL/L (ref 4–13)
AST SERPL W P-5'-P-CCNC: 28 U/L (ref 5–45)
BASOPHILS # BLD AUTO: 0.04 THOUSANDS/ΜL (ref 0–0.1)
BASOPHILS NFR BLD AUTO: 1 % (ref 0–1)
BILIRUB SERPL-MCNC: 0.7 MG/DL (ref 0.2–1)
BUN SERPL-MCNC: 19 MG/DL (ref 5–25)
CALCIUM SERPL-MCNC: 9.5 MG/DL (ref 8.3–10.1)
CHLORIDE SERPL-SCNC: 109 MMOL/L (ref 100–108)
CHOLEST SERPL-MCNC: 198 MG/DL (ref 50–200)
CO2 SERPL-SCNC: 30 MMOL/L (ref 21–32)
CREAT SERPL-MCNC: 0.94 MG/DL (ref 0.6–1.3)
EOSINOPHIL # BLD AUTO: 0.18 THOUSAND/ΜL (ref 0–0.61)
EOSINOPHIL NFR BLD AUTO: 3 % (ref 0–6)
ERYTHROCYTE [DISTWIDTH] IN BLOOD BY AUTOMATED COUNT: 13.3 % (ref 11.6–15.1)
GFR SERPL CREATININE-BSD FRML MDRD: 87 ML/MIN/1.73SQ M
GLUCOSE P FAST SERPL-MCNC: 83 MG/DL (ref 65–99)
HCT VFR BLD AUTO: 46.5 % (ref 36.5–49.3)
HDLC SERPL-MCNC: 62 MG/DL
HGB BLD-MCNC: 15 G/DL (ref 12–17)
IMM GRANULOCYTES # BLD AUTO: 0.01 THOUSAND/UL (ref 0–0.2)
IMM GRANULOCYTES NFR BLD AUTO: 0 % (ref 0–2)
LDLC SERPL CALC-MCNC: 126 MG/DL (ref 0–100)
LYMPHOCYTES # BLD AUTO: 1.32 THOUSANDS/ΜL (ref 0.6–4.47)
LYMPHOCYTES NFR BLD AUTO: 23 % (ref 14–44)
MCH RBC QN AUTO: 29.6 PG (ref 26.8–34.3)
MCHC RBC AUTO-ENTMCNC: 32.3 G/DL (ref 31.4–37.4)
MCV RBC AUTO: 92 FL (ref 82–98)
MONOCYTES # BLD AUTO: 0.78 THOUSAND/ΜL (ref 0.17–1.22)
MONOCYTES NFR BLD AUTO: 13 % (ref 4–12)
NEUTROPHILS # BLD AUTO: 3.5 THOUSANDS/ΜL (ref 1.85–7.62)
NEUTS SEG NFR BLD AUTO: 60 % (ref 43–75)
NRBC BLD AUTO-RTO: 0 /100 WBCS
PLATELET # BLD AUTO: 189 THOUSANDS/UL (ref 149–390)
PMV BLD AUTO: 11.8 FL (ref 8.9–12.7)
POTASSIUM SERPL-SCNC: 4.4 MMOL/L (ref 3.5–5.3)
PROT SERPL-MCNC: 7.1 G/DL (ref 6.4–8.2)
PSA SERPL-MCNC: 0.9 NG/ML (ref 0–4)
RBC # BLD AUTO: 5.07 MILLION/UL (ref 3.88–5.62)
SODIUM SERPL-SCNC: 143 MMOL/L (ref 136–145)
TRIGL SERPL-MCNC: 50 MG/DL
WBC # BLD AUTO: 5.83 THOUSAND/UL (ref 4.31–10.16)

## 2020-12-30 PROCEDURE — G0103 PSA SCREENING: HCPCS | Performed by: INTERNAL MEDICINE

## 2020-12-30 PROCEDURE — 80053 COMPREHEN METABOLIC PANEL: CPT | Performed by: INTERNAL MEDICINE

## 2020-12-30 PROCEDURE — 80061 LIPID PANEL: CPT | Performed by: INTERNAL MEDICINE

## 2020-12-30 PROCEDURE — 36415 COLL VENOUS BLD VENIPUNCTURE: CPT | Performed by: INTERNAL MEDICINE

## 2020-12-30 PROCEDURE — 85025 COMPLETE CBC W/AUTO DIFF WBC: CPT | Performed by: INTERNAL MEDICINE

## 2021-03-30 ENCOUNTER — IMMUNIZATIONS (OUTPATIENT)
Dept: FAMILY MEDICINE CLINIC | Facility: HOSPITAL | Age: 61
End: 2021-03-30

## 2021-03-30 DIAGNOSIS — Z23 ENCOUNTER FOR IMMUNIZATION: Primary | ICD-10-CM

## 2021-03-30 PROCEDURE — 0001A SARS-COV-2 / COVID-19 MRNA VACCINE (PFIZER-BIONTECH) 30 MCG: CPT

## 2021-03-30 PROCEDURE — 91300 SARS-COV-2 / COVID-19 MRNA VACCINE (PFIZER-BIONTECH) 30 MCG: CPT

## 2021-04-23 ENCOUNTER — IMMUNIZATIONS (OUTPATIENT)
Dept: FAMILY MEDICINE CLINIC | Facility: HOSPITAL | Age: 61
End: 2021-04-23

## 2021-04-23 DIAGNOSIS — Z23 ENCOUNTER FOR IMMUNIZATION: Primary | ICD-10-CM

## 2021-04-23 PROCEDURE — 91300 SARS-COV-2 / COVID-19 MRNA VACCINE (PFIZER-BIONTECH) 30 MCG: CPT

## 2021-04-23 PROCEDURE — 0002A SARS-COV-2 / COVID-19 MRNA VACCINE (PFIZER-BIONTECH) 30 MCG: CPT

## 2021-06-10 ENCOUNTER — CLINICAL SUPPORT (OUTPATIENT)
Dept: INTERNAL MEDICINE CLINIC | Facility: CLINIC | Age: 61
End: 2021-06-10
Payer: COMMERCIAL

## 2021-06-10 DIAGNOSIS — Z23 NEED FOR VACCINATION: Primary | ICD-10-CM

## 2021-06-10 PROCEDURE — 90750 HZV VACC RECOMBINANT IM: CPT

## 2021-06-10 PROCEDURE — 90471 IMMUNIZATION ADMIN: CPT

## 2021-09-22 ENCOUNTER — CLINICAL SUPPORT (OUTPATIENT)
Dept: INTERNAL MEDICINE CLINIC | Facility: CLINIC | Age: 61
End: 2021-09-22
Payer: COMMERCIAL

## 2021-09-22 DIAGNOSIS — Z23 NEED FOR ZOSTER VACCINATION: Primary | ICD-10-CM

## 2021-09-22 PROCEDURE — 90750 HZV VACC RECOMBINANT IM: CPT

## 2021-09-22 PROCEDURE — 90471 IMMUNIZATION ADMIN: CPT

## 2021-09-22 NOTE — PROGRESS NOTES
Assessment/Plan:    No problem-specific Assessment & Plan notes found for this encounter  Diagnoses and all orders for this visit:    Need for zoster vaccination  -     Zoster Vaccine Recombinant IM          Subjective:      Patient ID: Len Tapia is a 59 y o  male  HPI        Review of Systems      Objective: There were no vitals taken for this visit           Physical Exam

## 2021-12-02 ENCOUNTER — OFFICE VISIT (OUTPATIENT)
Dept: INTERNAL MEDICINE CLINIC | Facility: CLINIC | Age: 61
End: 2021-12-02
Payer: COMMERCIAL

## 2021-12-02 VITALS
SYSTOLIC BLOOD PRESSURE: 112 MMHG | WEIGHT: 176.6 LBS | TEMPERATURE: 97.2 F | RESPIRATION RATE: 16 BRPM | DIASTOLIC BLOOD PRESSURE: 72 MMHG | HEART RATE: 68 BPM | OXYGEN SATURATION: 97 % | HEIGHT: 66 IN | BODY MASS INDEX: 28.38 KG/M2

## 2021-12-02 DIAGNOSIS — Z00.00 ANNUAL PHYSICAL EXAM: Primary | ICD-10-CM

## 2021-12-02 DIAGNOSIS — R20.2 PARESTHESIA OF LEFT LEG: ICD-10-CM

## 2021-12-02 DIAGNOSIS — Z00.00 LABORATORY EXAM ORDERED AS PART OF ROUTINE GENERAL MEDICAL EXAMINATION: ICD-10-CM

## 2021-12-02 DIAGNOSIS — Z12.5 SCREENING PSA (PROSTATE SPECIFIC ANTIGEN): ICD-10-CM

## 2021-12-02 DIAGNOSIS — G57.02 PIRIFORMIS SYNDROME, LEFT: ICD-10-CM

## 2021-12-02 PROCEDURE — 99396 PREV VISIT EST AGE 40-64: CPT | Performed by: INTERNAL MEDICINE

## 2021-12-02 PROCEDURE — 3008F BODY MASS INDEX DOCD: CPT | Performed by: INTERNAL MEDICINE

## 2021-12-02 PROCEDURE — 3725F SCREEN DEPRESSION PERFORMED: CPT | Performed by: INTERNAL MEDICINE

## 2021-12-02 PROCEDURE — 1036F TOBACCO NON-USER: CPT | Performed by: INTERNAL MEDICINE

## 2021-12-29 ENCOUNTER — APPOINTMENT (OUTPATIENT)
Dept: LAB | Facility: CLINIC | Age: 61
End: 2021-12-29
Payer: COMMERCIAL

## 2021-12-29 LAB
ALBUMIN SERPL BCP-MCNC: 3.5 G/DL (ref 3.5–5)
ALP SERPL-CCNC: 55 U/L (ref 46–116)
ALT SERPL W P-5'-P-CCNC: 21 U/L (ref 12–78)
ANION GAP SERPL CALCULATED.3IONS-SCNC: 6 MMOL/L (ref 4–13)
AST SERPL W P-5'-P-CCNC: 24 U/L (ref 5–45)
BASOPHILS # BLD AUTO: 0.03 THOUSANDS/ΜL (ref 0–0.1)
BASOPHILS NFR BLD AUTO: 1 % (ref 0–1)
BILIRUB SERPL-MCNC: 0.6 MG/DL (ref 0.2–1)
BUN SERPL-MCNC: 18 MG/DL (ref 5–25)
CALCIUM SERPL-MCNC: 8.6 MG/DL (ref 8.3–10.1)
CHLORIDE SERPL-SCNC: 105 MMOL/L (ref 100–108)
CHOLEST SERPL-MCNC: 187 MG/DL
CO2 SERPL-SCNC: 29 MMOL/L (ref 21–32)
CREAT SERPL-MCNC: 1.05 MG/DL (ref 0.6–1.3)
EOSINOPHIL # BLD AUTO: 0.26 THOUSAND/ΜL (ref 0–0.61)
EOSINOPHIL NFR BLD AUTO: 5 % (ref 0–6)
ERYTHROCYTE [DISTWIDTH] IN BLOOD BY AUTOMATED COUNT: 13.2 % (ref 11.6–15.1)
GFR SERPL CREATININE-BSD FRML MDRD: 75 ML/MIN/1.73SQ M
GLUCOSE P FAST SERPL-MCNC: 87 MG/DL (ref 65–99)
HCT VFR BLD AUTO: 44.6 % (ref 36.5–49.3)
HDLC SERPL-MCNC: 66 MG/DL
HGB BLD-MCNC: 14.8 G/DL (ref 12–17)
IMM GRANULOCYTES # BLD AUTO: 0.01 THOUSAND/UL (ref 0–0.2)
IMM GRANULOCYTES NFR BLD AUTO: 0 % (ref 0–2)
LDLC SERPL CALC-MCNC: 115 MG/DL (ref 0–100)
LYMPHOCYTES # BLD AUTO: 1.16 THOUSANDS/ΜL (ref 0.6–4.47)
LYMPHOCYTES NFR BLD AUTO: 22 % (ref 14–44)
MCH RBC QN AUTO: 30.2 PG (ref 26.8–34.3)
MCHC RBC AUTO-ENTMCNC: 33.2 G/DL (ref 31.4–37.4)
MCV RBC AUTO: 91 FL (ref 82–98)
MONOCYTES # BLD AUTO: 0.7 THOUSAND/ΜL (ref 0.17–1.22)
MONOCYTES NFR BLD AUTO: 13 % (ref 4–12)
NEUTROPHILS # BLD AUTO: 3.16 THOUSANDS/ΜL (ref 1.85–7.62)
NEUTS SEG NFR BLD AUTO: 59 % (ref 43–75)
NRBC BLD AUTO-RTO: 0 /100 WBCS
PLATELET # BLD AUTO: 182 THOUSANDS/UL (ref 149–390)
PMV BLD AUTO: 11.8 FL (ref 8.9–12.7)
POTASSIUM SERPL-SCNC: 4.4 MMOL/L (ref 3.5–5.3)
PROT SERPL-MCNC: 6.7 G/DL (ref 6.4–8.2)
PSA SERPL-MCNC: 1 NG/ML (ref 0–4)
RBC # BLD AUTO: 4.9 MILLION/UL (ref 3.88–5.62)
SODIUM SERPL-SCNC: 140 MMOL/L (ref 136–145)
TRIGL SERPL-MCNC: 32 MG/DL
WBC # BLD AUTO: 5.32 THOUSAND/UL (ref 4.31–10.16)

## 2021-12-29 PROCEDURE — G0103 PSA SCREENING: HCPCS | Performed by: INTERNAL MEDICINE

## 2021-12-29 PROCEDURE — 85025 COMPLETE CBC W/AUTO DIFF WBC: CPT | Performed by: INTERNAL MEDICINE

## 2021-12-29 PROCEDURE — 80061 LIPID PANEL: CPT | Performed by: INTERNAL MEDICINE

## 2021-12-29 PROCEDURE — 36415 COLL VENOUS BLD VENIPUNCTURE: CPT | Performed by: INTERNAL MEDICINE

## 2021-12-29 PROCEDURE — 80053 COMPREHEN METABOLIC PANEL: CPT | Performed by: INTERNAL MEDICINE

## 2022-02-02 ENCOUNTER — OFFICE VISIT (OUTPATIENT)
Dept: INTERNAL MEDICINE CLINIC | Facility: CLINIC | Age: 62
End: 2022-02-02
Payer: COMMERCIAL

## 2022-02-02 VITALS
OXYGEN SATURATION: 97 % | TEMPERATURE: 97.8 F | HEIGHT: 66 IN | WEIGHT: 178.6 LBS | RESPIRATION RATE: 14 BRPM | DIASTOLIC BLOOD PRESSURE: 90 MMHG | BODY MASS INDEX: 28.7 KG/M2 | SYSTOLIC BLOOD PRESSURE: 132 MMHG | HEART RATE: 59 BPM

## 2022-02-02 DIAGNOSIS — M25.552 LEFT HIP PAIN: Primary | ICD-10-CM

## 2022-02-02 DIAGNOSIS — M54.32 SCIATICA OF LEFT SIDE: ICD-10-CM

## 2022-02-02 PROCEDURE — 3008F BODY MASS INDEX DOCD: CPT | Performed by: INTERNAL MEDICINE

## 2022-02-02 PROCEDURE — 99214 OFFICE O/P EST MOD 30 MIN: CPT | Performed by: INTERNAL MEDICINE

## 2022-02-02 PROCEDURE — 1036F TOBACCO NON-USER: CPT | Performed by: INTERNAL MEDICINE

## 2022-02-02 RX ORDER — GABAPENTIN 100 MG/1
100 CAPSULE ORAL 3 TIMES DAILY
Qty: 90 CAPSULE | Refills: 1 | Status: SHIPPED | OUTPATIENT
Start: 2022-02-02

## 2022-02-02 NOTE — PROGRESS NOTES
Assessment/Plan:  Lumbar radiculopathy vs piriformis syndrome  He is having pain in the left hip now so will obtain Xray of the left hip  Resume gabapentin  Establish with pain mgt     Problem List Items Addressed This Visit     None      Visit Diagnoses     Left hip pain    -  Primary    Relevant Orders    XR hip/pelv 2-3 vws left if performed    Sciatica of left side        Relevant Medications    gabapentin (Neurontin) 100 mg capsule    Other Relevant Orders    Ambulatory Referral to Pain Management            Subjective:      Patient ID: Michelle Garcia is a 59 y o  male  HPI  Pain described as tingling and soreness in the left buttock down the posterior thigh and calf  Numbness in the left foot  Thre is also pain in the left hip  Denies weakness of the legs, bowel and bladder incontinence, saddle anesthesia  Similar symptoms 2 years ago and lumbar MRI showed multilevel degenerative changes without nerve root compression  He went to PT , took gabapentin and symptoms improved until November/ December  The pain is 4/10  He saw a chiropractor a few times recently  He continues to perform the exercises from PT and the chiropractor without relief    The following portions of the patient's history were reviewed and updated as appropriate: allergies, current medications, past family history, past medical history, past social history, past surgical history and problem list     Review of Systems   Musculoskeletal: Positive for arthralgias  Negative for back pain  Neurological: Positive for numbness  Negative for weakness  Objective:      /90   Pulse 59   Temp 97 8 °F (36 6 °C)   Resp 14   Ht 5' 6" (1 676 m)   Wt 81 kg (178 lb 9 6 oz)   SpO2 97%   BMI 28 83 kg/m²          Physical Exam  Constitutional:       General: He is not in acute distress  Appearance: He is not ill-appearing, toxic-appearing or diaphoretic  Cardiovascular:      Rate and Rhythm: Regular rhythm        Heart sounds: No murmur heard  Pulmonary:      Effort: No respiratory distress  Breath sounds: Normal breath sounds  Musculoskeletal:         General: No tenderness (none over the lumbar spine, SI joints, left hip)  Right lower leg: No edema  Left lower leg: No edema        Comments: +straight leg raise left leg  5/5 both LE  Pain in the left hip with flexion

## 2022-02-03 ENCOUNTER — HOSPITAL ENCOUNTER (OUTPATIENT)
Dept: RADIOLOGY | Facility: HOSPITAL | Age: 62
Discharge: HOME | End: 2022-02-03
Payer: COMMERCIAL

## 2022-02-03 DIAGNOSIS — M25.552 LEFT HIP PAIN: ICD-10-CM

## 2022-02-03 PROCEDURE — 73502 X-RAY EXAM HIP UNI 2-3 VIEWS: CPT

## 2022-02-11 ENCOUNTER — TELEPHONE (OUTPATIENT)
Dept: INTERNAL MEDICINE CLINIC | Facility: CLINIC | Age: 62
End: 2022-02-11

## 2022-03-08 ENCOUNTER — CONSULT (OUTPATIENT)
Dept: PAIN MEDICINE | Facility: CLINIC | Age: 62
End: 2022-03-08
Payer: COMMERCIAL

## 2022-03-08 VITALS
BODY MASS INDEX: 28.73 KG/M2 | WEIGHT: 178 LBS | DIASTOLIC BLOOD PRESSURE: 74 MMHG | SYSTOLIC BLOOD PRESSURE: 130 MMHG | HEART RATE: 62 BPM

## 2022-03-08 DIAGNOSIS — M51.16 LUMBAR DISC DISEASE WITH RADICULOPATHY: ICD-10-CM

## 2022-03-08 DIAGNOSIS — G57.02 PIRIFORMIS SYNDROME OF LEFT SIDE: ICD-10-CM

## 2022-03-08 DIAGNOSIS — M62.838 MUSCLE SPASM: ICD-10-CM

## 2022-03-08 DIAGNOSIS — G89.4 CHRONIC PAIN SYNDROME: Primary | ICD-10-CM

## 2022-03-08 PROCEDURE — 99204 OFFICE O/P NEW MOD 45 MIN: CPT | Performed by: PHYSICAL MEDICINE & REHABILITATION

## 2022-03-08 RX ORDER — METHOCARBAMOL 500 MG/1
500 TABLET, FILM COATED ORAL 2 TIMES DAILY PRN
Qty: 30 TABLET | Refills: 0 | Status: SHIPPED | OUTPATIENT
Start: 2022-03-08

## 2022-03-08 NOTE — PROGRESS NOTES
Assessment  1  Chronic pain syndrome    2  Piriformis syndrome of left side    3  Lumbar disc disease with radiculopathy    4  Muscle spasm        Plan  Mr Kimberlyn Eckert is a pleasant 27-year-old male who presents for initial evaluation regarding low back/left buttock pain with intermittent radiating symptoms into the posterolateral and posterior left thigh and calf  During today's evaluation he is demonstrating pain that is likely multifactorial nature with a majority the pain appears to be generating from the piriformis musculature and lumbar spine  He has already tried conservative measures with home exercises, physical therapy, chiropractic manipulation with minimal relief  At this time we will   1  Order lumbar MRI without contrast to better identify disc and spine pathology contributing to symptoms  Again he has completed home exercises as well as chiropractic manipulation for at least 6 weeks in the last 6 months with minimal relief   2  Will plan for left piriformis injection under fluoro guidance   3  Will provide Robaxin 500 mg twice a day as needed for muscle spasms   4  Complete risks and benefits including bleeding, infection, tissue reaction, nerve injury and allergic reaction were discussed  The approach was demonstrated using models and literature was provided  Verbal and written consent was obtained  My impressions and treatment recommendations were discussed in detail with the patient who verbalized understanding and had no further questions  Discharge instructions were provided  I personally saw and examined the patient and I agree with the above discussed plan of care  Orders Placed This Encounter   Procedures    FL spine and pain procedure     Standing Status:   Future     Standing Expiration Date:   3/8/2026     Order Specific Question:   Reason for Exam:     Answer:   Left pirioformis inj     Order Specific Question:   Anticoagulant hold needed?      Answer:   No    MRI lumbar spine without contrast     Standing Status:   Future     Standing Expiration Date:   3/8/2026     Scheduling Instructions: There is no preparation for this test  Please leave your jewelry and valuables at home, wedding rings are the exception  Magnetic nail polish must be removed prior to arrival for your test  Please bring your insurance cards, a form of photo ID and a list of your medications with you  Arrive 15 minutes prior to your appointment time in order to register  Please bring any prior CT or MRI studies of this area that were not performed at a [facility]  To schedule this appointment, please contact Central Scheduling at [phone]  Prior to your appointment, please make sure you complete the MRI Screening Form when you e-Check in for your appointment  This will be available starting 7 days before your appointment in [address]  You may receive an e-mail with an activation code if you do not have a [employer] account  If you do not have access to a device, we will complete your screening at your appointment  Order Specific Question:   What is the patient's sedation requirement? Answer:   No Sedation     Order Specific Question:   Release to patient through [facility]     Answer:   Immediate     Order Specific Question:   Is order priority selected as STAT? Answer:   No     Order Specific Question:   Reason for Exam (FREE TEXT)     Answer:   lumbar radic     New Medications Ordered This Visit   Medications    methocarbamol (ROBAXIN) 500 mg tablet     Sig: Take 1 tablet (500 mg total) by mouth 2 (two) times a day as needed for muscle spasms     Dispense:  30 tablet     Refill:  0       History of Present Illness    Eriberto Jarvis is a 59 y o  male presents to [place] [address] [facility] for initial evaluation regarding 4 months duration of left-sided low back pain with radiating symptoms into the left leg    Denies any significant inciting event or recent trauma  Today reports moderate to severe pain rated 7/10 and interfering with daily activities  Pain is intermittent 30-60% of the time that is worse in the afternoon  Describes symptoms as burning, numbness, sharp, pins needles, throbbing pain  Denies any significant weakness or falls  Does not use any durable medical equipment for ambulation  Symptoms are worse with standing, walking  Has had moderate relief with physical therapy and exercise and heat and no significant improvements with chiropractic manipulation  Currently taking naproxen with minimal relief  Presents today for initial evaluation  I have personally reviewed and/or updated the patient's past medical history, past surgical history, family history, social history, current medications, allergies, and vital signs today  Review of Systems   Constitutional: Negative for fever and unexpected weight change  HENT: Negative for trouble swallowing  Eyes: Negative for visual disturbance  Respiratory: Negative for shortness of breath and wheezing  Cardiovascular: Negative for chest pain and palpitations  Gastrointestinal: Negative for constipation, diarrhea, nausea and vomiting  Endocrine: Negative for cold intolerance, heat intolerance and polydipsia  Genitourinary: Negative for difficulty urinating and frequency  Musculoskeletal: Positive for back pain, gait problem and joint swelling  Negative for arthralgias and myalgias  Skin: Negative for rash  Neurological: Positive for numbness  Negative for dizziness, seizures, syncope, weakness and headaches  Hematological: Does not bruise/bleed easily  Psychiatric/Behavioral: Negative for dysphoric mood  All other systems reviewed and are negative        Patient Active Problem List   Diagnosis   (none) - all problems resolved or deleted       Past Medical History:   Diagnosis Date    Elevated blood pressure reading     Last assessed: 3/24/15    Exposure to SARS-associated coronavirus 11/12/2020       Past Surgical History:   Procedure Laterality Date    KNEE ARTHROSCOPY Left     UMBILICAL HERNIA REPAIR  1985       Family History   Problem Relation Age of Onset    Leukemia Mother     Diabetes Father         Mellitus    Hypertension Father        Social History     Occupational History    Occupation: Retired   Tobacco Use    Smoking status: Never Smoker    Smokeless tobacco: Never Used   Vaping Use    Vaping Use: Never used   Substance and Sexual Activity    Alcohol use: Yes     Alcohol/week: 5 0 standard drinks     Types: 3 Cans of beer, 2 Glasses of wine per week     Comment: Social    Drug use: Never    Sexual activity: Yes       Current Outpatient Medications on File Prior to Visit   Medication Sig    gabapentin (Neurontin) 100 mg capsule Take 1 capsule (100 mg total) by mouth 3 (three) times a day    Naproxen Sodium (ALEVE PO) Take 200 mg by mouth 2 (two) times a day    PreviDent 5000 Booster Plus 1 1 % PSTE BRUSH NORMALLY AND EXPECTORATE BUT DO NOT RINSE AFTER  DO NOT EAT OR DRINK ANYTHING FOR AT LEAST 30 MINUTES AFTER USE  No current facility-administered medications on file prior to visit  Allergies   Allergen Reactions    Oxycodone-Acetaminophen Nausea Only       Physical Exam    /74   Pulse 62   Wt 80 7 kg (178 lb)   BMI 28 73 kg/m²     Constitutional: normal, well developed, well nourished, alert, in no distress and non-toxic and no overt pain behavior    Eyes: anicteric  HEENT: grossly intact  Neck: supple, symmetric, trachea midline and no masses   Pulmonary:even and unlabored  Cardiovascular:No edema or pitting edema present  Skin:Normal without rashes or lesions and well hydrated  Psychiatric:Mood and affect appropriate  Neurologic:Cranial Nerves II-XII grossly intact  Musculoskeletal:normal gait, tenderness to palpation left glutes, decreased active and passive range of motion with lumbar flexion and extension limited by pain, MMT 5/5 bilateral lower extremities, sensation grossly intact to light touch, DTRs within normal limits, positive FADIR left-sided with palpation left glutes    Imaging            MRI LUMBAR SPINE WITHOUT CONTRAST     INDICATION: M54 [id number]: Lumbago with sciatica, left side      COMPARISON:  X-ray 12/12/2019     TECHNIQUE:  Sagittal T1, sagittal T2, sagittal inversion recovery, axial T1 and axial T2, coronal T2     IMAGE QUALITY:  Diagnostic     FINDINGS:     VERTEBRAL BODIES:  There are 5 nonrib-bearing lumbar type vertebral bodies  Degenerative grade 1 anterolisthesis of L4 on L5  Very minor retrolisthesis of L2 on L3 and L3 on L4  Normal marrow signal is identified within the visualized bony structures  No discrete marrow lesion      SACRUM:  Normal signal within the sacrum  No evidence of insufficiency or stress fracture      DISTAL CORD AND CONUS:  Normal size and signal within the distal cord and conus  Conus terminates at the T12-L1 level      PARASPINAL SOFT TISSUES:  Paraspinal soft tissues are unremarkable      LOWER THORACIC DISC SPACES:  Normal disc height and signal   No disc herniation, canal stenosis or foraminal narrowing  Marginal osteophytes anteriorly through the low dorsal spine      LUMBAR DISC SPACES:     L1-L2:  Minor facet arthrosis     L2-L3:  Reduction disc height, circumferential bulge, very slight retrolisthesis of L2    Mild facet arthrosis      L3-L4:  Slight reduction disc height, circumferential bulge, moderate facet arthrosis      L4-L5:  Advanced bilateral facet arthrosis, grade 1 anterolisthesis, minor foraminal narrowing, no critical stenosis      L5-S1:  Bilateral, right greater than left facet arthrosis      IMPRESSION:     Multilevel degenerative changes present to varying degree, without nerve root compression            LEFT HIP     INDICATION:   M25 552: Pain in left hip      COMPARISON:  None     VIEWS:  XR HIP/PELV 2-3 VWS LEFT  W PELVIS IF PERFORMED       FINDINGS:     There is no acute fracture or dislocation      No significant hip degenerative changes      No lytic or blastic osseous lesion      Soft tissues are unremarkable  Bilateral inguinal surgical clips are noted      Degenerative changes visualized lower lumbar spine      IMPRESSION:     No acute osseous abnormality            SACROILIAC JOINTS     INDICATION:   M54 [id number]: Lumbago with sciatica, left side      COMPARISON:  None     VIEWS:  XR SACROILIAC JOINTS < 3 VIEWS         FINDINGS:     Upper left SI and bilateral lower SI joint space narrowing and slight marginal sclerosis with lower SI joint vacuum phenomena      Sacral arcuate lines appear intact      No fracture or pathologic bone lesions seen      Degenerative lower lumbar spine    Right proximal lateral inferior ischial ramus soft tissue calcification versus old healed nonunion trauma      IMPRESSION:     Mild degenerative changes

## 2022-03-08 NOTE — PATIENT INSTRUCTIONS
Piriformis Syndrome   WHAT YOU NEED TO KNOW:   Piriformis syndrome is sciatic nerve pain caused by an injured or overused piriformis muscle  This is a muscle inside your buttocks that helps you move your leg  The pain is caused when this muscle pinches your sciatic nerve  You may feel the pain in your hip or down your leg  DISCHARGE INSTRUCTIONS:   Medicines: You may need any of the following:  · Prescription medicines  may be used to relax your muscles and decrease pain and swelling  · NSAIDs  help decrease swelling and pain  This medicine is available with or without a doctor's order  NSAIDs can cause stomach bleeding or kidney problems in certain people  If you take blood thinner medicine, always ask your healthcare provider if NSAIDs are safe for you  Always read the medicine label and follow directions  · Acetaminophen  decreases pain  It is available without a doctor's order  Ask how much to take and how often to take it  Follow directions  Acetaminophen can cause liver damage if not taken correctly  · Take your medicine as directed  Contact your healthcare provider if you think your medicine is not helping or if you have side effects  Tell him or her if you are allergic to any medicine  Keep a list of the medicines, vitamins, and herbs you take  Include the amounts, and when and why you take them  Bring the list or the pill bottles to follow-up visits  Carry your medicine list with you in case of an emergency  Follow up with your healthcare provider as directed: You may need to return for more tests  You may also be referred to a physical therapist  Write down your questions so you remember to ask them during your visits  Manage your symptoms of piriformis syndrome:   · Rest as directed  Avoid activities that make your pain worse  · Apply ice to the buttock on your injured side  Use an ice pack, or put crushed ice in a plastic bag  Cover it with a towel   Leave the ice on for 15 to 20 minutes every hour, or as directed  Ice helps prevent tissue damage and decreases swelling and pain  · Apply heat to the buttock on your injured side  Use heating pads for 20 to 30 minutes every 2 hours for as many days as directed  Heat helps decrease pain and muscle spasms  · Stretch as directed  Lie on your back with your knees bent  Place the ankle of your injured leg on the knee of your other leg  Gently pull your bent leg toward your chest, until you feel a stretch in the buttock of your injured leg  A physical therapist may show you other exercises to stretch and strengthen your hip muscles  Contact your healthcare provider if:   · Your pain worsens or returns, even with treatment  · You have questions or concerns about your condition or care  Return to the emergency department if:   · You cannot move your leg or foot  © Copyright [employer] 2022 Information is for End User's use only and may not be sold, redistributed or otherwise used for commercial purposes  All illustrations and images included in CareNotes® are the copyrighted property of A [employer] Inc  or [facility]   The above information is an  only  It is not intended as medical advice for individual conditions or treatments  Talk to your doctor, nurse or pharmacist before following any medical regimen to see if it is safe and effective for you

## 2022-03-16 ENCOUNTER — HOSPITAL ENCOUNTER (OUTPATIENT)
Dept: RADIOLOGY | Facility: CLINIC | Age: 62
Discharge: HOME | End: 2022-03-16
Payer: COMMERCIAL

## 2022-03-16 VITALS
DIASTOLIC BLOOD PRESSURE: 84 MMHG | RESPIRATION RATE: 18 BRPM | OXYGEN SATURATION: 98 % | TEMPERATURE: 98.6 F | HEART RATE: 61 BPM | SYSTOLIC BLOOD PRESSURE: 150 MMHG

## 2022-03-16 DIAGNOSIS — G57.02 PIRIFORMIS SYNDROME OF LEFT SIDE: ICD-10-CM

## 2022-03-16 DIAGNOSIS — G89.4 CHRONIC PAIN SYNDROME: ICD-10-CM

## 2022-03-16 DIAGNOSIS — M51.16 LUMBAR DISC DISEASE WITH RADICULOPATHY: ICD-10-CM

## 2022-03-16 PROCEDURE — 20552 NJX 1/MLT TRIGGER POINT 1/2: CPT | Performed by: PHYSICAL MEDICINE & REHABILITATION

## 2022-03-16 PROCEDURE — 77002 NEEDLE LOCALIZATION BY XRAY: CPT | Performed by: PHYSICAL MEDICINE & REHABILITATION

## 2022-03-16 RX ORDER — BUPIVACAINE HCL/PF 2.5 MG/ML
10 VIAL (ML) INJECTION ONCE
Status: COMPLETED | OUTPATIENT
Start: 2022-03-16 | End: 2022-03-16

## 2022-03-16 RX ORDER — 0.9 % SODIUM CHLORIDE 0.9 %
10 VIAL (ML) INJECTION ONCE
Status: COMPLETED | OUTPATIENT
Start: 2022-03-16 | End: 2022-03-16

## 2022-03-16 RX ORDER — METHYLPREDNISOLONE ACETATE 80 MG/ML
80 INJECTION, SUSPENSION INTRA-ARTICULAR; INTRALESIONAL; INTRAMUSCULAR; PARENTERAL; SOFT TISSUE ONCE
Status: COMPLETED | OUTPATIENT
Start: 2022-03-16 | End: 2022-03-16

## 2022-03-16 RX ADMIN — METHYLPREDNISOLONE ACETATE 80 MG: 80 INJECTION, SUSPENSION INTRA-ARTICULAR; INTRALESIONAL; INTRAMUSCULAR; PARENTERAL; SOFT TISSUE at 11:33

## 2022-03-16 RX ADMIN — SODIUM CHLORIDE 2 ML: 9 INJECTION, SOLUTION INTRAMUSCULAR; INTRAVENOUS; SUBCUTANEOUS at 11:31

## 2022-03-16 RX ADMIN — IOHEXOL 1 ML: 300 INJECTION, SOLUTION INTRAVENOUS at 11:32

## 2022-03-16 RX ADMIN — Medication 2 ML: at 11:31

## 2022-03-16 RX ADMIN — BUPIVACAINE HYDROCHLORIDE 3 ML: 2.5 INJECTION, SOLUTION EPIDURAL; INFILTRATION; INTRACAUDAL at 11:33

## 2022-03-16 NOTE — DISCHARGE INSTR - LAB
Do not apply heat to any area that is numb  If you have discomfort or soreness at the injection site, you may apply ice today, 20 minutes on and 20 minutes off  Tomorrow you may use ice or warm, moist heat  Do not apply ice or heat directly to the skin  If you experience severe shortness of breath, go to the Emergency Room  You may have numbness for several hours from the local anesthetic  Please use caution and common sense, especially with weight-bearing activities  You may have an increase or change in the discomfort for 36-48 hours after your treatment  Apply ice and continue with any pain medicine you have been prescribed  Do not do anything strenuous today  You may shower, but no tub baths or hot tubs today  You may resume your normal activities tomorrow, but do not overdo it  Resume normal activities slowly when you are feeling better  If you experience redness, drainage or swelling at the injection site, or if you develop a fever above 100 degrees, please call [facility] at [phone] or go to the Emergency Room  Continue to take all routine medicines prescribed by your primary care physician unless otherwise instructed by our staff  Most blood thinners should be started again according to your regularly scheduled dosing  If you have any questions, please give our office a call  As no general anesthesia was used in today's procedure, you should not experience any side effects related to anesthesia  If you have a problem specifically related to your procedure, please call our office at [phone]  Problems not related to your procedure should be directed to your primary care physician

## 2022-03-16 NOTE — H&P
History of Present Illness: The patient is a 59 y o  male who presents with complaints of left-sided low back and buttock pain    Patient Active Problem List   Diagnosis   (none) - all problems resolved or deleted       Past Medical History:   Diagnosis Date    Elevated blood pressure reading     Last assessed: 3/24/15    Exposure to SARS-associated coronavirus 11/12/2020       Past Surgical History:   Procedure Laterality Date    KNEE ARTHROSCOPY Left     UMBILICAL HERNIA REPAIR  1985         Current Outpatient Medications:     gabapentin (Neurontin) 100 mg capsule, Take 1 capsule (100 mg total) by mouth 3 (three) times a day, Disp: 90 capsule, Rfl: 1    methocarbamol (ROBAXIN) 500 mg tablet, Take 1 tablet (500 mg total) by mouth 2 (two) times a day as needed for muscle spasms, Disp: 30 tablet, Rfl: 0    Naproxen Sodium (ALEVE PO), Take 200 mg by mouth 2 (two) times a day, Disp: , Rfl:     PreviDent 5000 Booster Plus 1 1 % PSTE, BRUSH NORMALLY AND EXPECTORATE BUT DO NOT RINSE AFTER   DO NOT EAT OR DRINK ANYTHING FOR AT LEAST 30 MINUTES AFTER USE , Disp: , Rfl:     Current Facility-Administered Medications:     bupivacaine (PF) (MARCAINE) 0 25 % injection 10 mL, 10 mL, Intra-articular, Once, [place] Rex, DO    iohexol (OMNIPAQUE) 300 mg/mL injection 50 mL, 50 mL, Intra-articular, Once, [place] Rex, DO    lidocaine (PF) (XYLOCAINE-MPF) 2 % injection 5 mL, 5 mL, Infiltration, Once, [place] Rex, DO    methylPREDNISolone acetate (DEPO-MEDROL) injection 80 mg, 80 mg, Intra-articular, Once, Jasmine Rex, DO    sodium chloride (PF) 0 9 % injection 10 mL, 10 mL, Infiltration, Once, Jasmine Rex, DO    Allergies   Allergen Reactions    Oxycodone-Acetaminophen Nausea Only       Physical Exam:   Vitals:    03/16/22 1125   BP: 143/82   Pulse: 63   Resp: 18   Temp: 98 6 °F (37 °C)   SpO2: 98%     General: Awake, Alert, Oriented x 3, Mood and affect appropriate  Respiratory: Respirations even and unlabored  Cardiovascular: Peripheral pulses intact; no edema  Musculoskeletal Exam:  Tenderness to palpation left-sided lumbar paraspinals and distal to PSIS/glutes    ASA Score: 2    Patient/Chart Verification  Patient ID Verified: Verbal  ID Band Applied: No  Consents Confirmed: Procedural,To be obtained in the Pre-Procedure area  H&P( within 30 days) Verified: To be obtained in the Pre-Procedure area  Allergies Reviewed: Yes  Anticoag/NSAID held?: No  Currently on antibiotics?: No    Assessment:   1  Piriformis syndrome of left side    2  Chronic pain syndrome    3   Lumbar disc disease with radiculopathy        Plan: Left pirioformis inj

## 2022-03-23 ENCOUNTER — TELEPHONE (OUTPATIENT)
Dept: PAIN MEDICINE | Facility: CLINIC | Age: 62
End: 2022-03-23

## 2022-03-23 NOTE — TELEPHONE ENCOUNTER
Pt reports 25-30% improvement post inj   Pain level 5-6/10  Pt aware I will call next week for an update    He wants to know if he can take the muscle relaxers?

## 2022-04-05 ENCOUNTER — HOSPITAL ENCOUNTER (OUTPATIENT)
Dept: RADIOLOGY | Age: 62
Discharge: HOME | End: 2022-04-05
Payer: COMMERCIAL

## 2022-04-05 DIAGNOSIS — M51.16 LUMBAR DISC DISEASE WITH RADICULOPATHY: ICD-10-CM

## 2022-04-05 DIAGNOSIS — G89.4 CHRONIC PAIN SYNDROME: ICD-10-CM

## 2022-04-05 DIAGNOSIS — G57.02 PIRIFORMIS SYNDROME OF LEFT SIDE: ICD-10-CM

## 2022-04-05 PROCEDURE — G1004 CDSM NDSC: HCPCS

## 2022-04-05 PROCEDURE — 72148 MRI LUMBAR SPINE W/O DYE: CPT

## 2022-04-07 ENCOUNTER — TELEPHONE (OUTPATIENT)
Dept: PAIN MEDICINE | Facility: MEDICAL CENTER | Age: 62
End: 2022-04-07

## 2022-04-07 NOTE — TELEPHONE ENCOUNTER
S/w pt and advised of same  Pt verbalized understanding  Per pt he still has pain in his legs and is not able to walk farther than 100 ft  Pt was offered an LESI  Pt would like to think about it and will call back if he is interested in scheduling the procedure

## 2022-04-07 NOTE — TELEPHONE ENCOUNTER
----- Message from Rosanna Kendall DO sent at 4/7/2022  1:43 PM EDT -----  Please notify patient of MRI lumbar spine results demonstrating multilevel degenerative changes with disc bulgingPreviously performed piriformis injection  As long as patient is doing well nothing further at this time    However, if patient reports continued pain may consider a L4-L5 lumbar epidural steroid injection Please advise Thank you  ----- Message -----  From: Janel, Radiology Results In  Sent: 4/7/2022  12:52 PM EDT  To: Rosanna Kendall DO

## 2022-04-08 NOTE — TELEPHONE ENCOUNTER
S/W pt  Advised pt Levert Mcardle will be back in the office on Monday  He wants to discuss with KW why he recommends this procedure  Advised will get a message to the  about the code  Please advise

## 2022-04-08 NOTE — TELEPHONE ENCOUNTER
Patient would like to know LESI procedure code to find out how much this procedure will cost him  He adds he would like to speak to Dr Randi Taylor   Please reach out to him with this information, thx    Call back# [phone]

## 2022-04-12 NOTE — TELEPHONE ENCOUNTER
Pt called in to schedule his procedure  Please be advised thank you    Pt can be reached @[phone]

## 2022-04-13 NOTE — TELEPHONE ENCOUNTER
Pt called CANDI spoke with him about a procedure and he would like to speak with you about what the code is and what the procedure intails and how mush it would cost him    Pt # [phone]

## 2022-04-14 NOTE — TELEPHONE ENCOUNTER
Pt called for the status of his procedure- pt is upset that his procedure hasnt been scheduled and hes been waiting 5 days to hear from [address]

## 2022-04-15 NOTE — TELEPHONE ENCOUNTER
Anay Hill from pcp office asking about scheduling a procedure for pt   Also would like to know how much this would cost     Please call pt # [phone]

## 2022-04-18 NOTE — TELEPHONE ENCOUNTER
Patient is requesting to schedule his injection & states he hasn't heard back from procedure  in 10 days  At this point, patient would like to speak to Dr Sandra Johnson or his nurses   Please reach out to him asap, thx    Call back# [phone]

## 2022-04-25 ENCOUNTER — TELEPHONE (OUTPATIENT)
Dept: PAIN MEDICINE | Facility: MEDICAL CENTER | Age: 62
End: 2022-04-25

## 2022-04-25 ENCOUNTER — TELEPHONE (OUTPATIENT)
Dept: NEUROLOGY | Facility: CLINIC | Age: 62
End: 2022-04-25

## 2022-04-25 NOTE — TELEPHONE ENCOUNTER
Pt of KW  Pt scheduled for a LESI on 5/9  EMG scheduled for 5/11  Should he have the EMG done first or would it make a difference?   Please advise

## 2022-04-25 NOTE — TELEPHONE ENCOUNTER
Patient calling in  He states he has an appointment for EMG with Dr Yohana Franco on 5/11/22  Patient states he is also receiving an epidural injection on 5/9 for his L4-5 region  Patient wants to make sure this will not interfere with his EMG test    Is patient okay to have epidural injection on 5/9 with EMG on 5/11?      Please advise     cb#: [phone], okay to leave detailed message

## 2022-04-26 ENCOUNTER — EVALUATION (OUTPATIENT)
Dept: PHYSICAL THERAPY | Facility: CLINIC | Age: 62
End: 2022-04-26
Payer: COMMERCIAL

## 2022-04-26 DIAGNOSIS — G57.02 PIRIFORMIS SYNDROME OF LEFT SIDE: ICD-10-CM

## 2022-04-26 DIAGNOSIS — M48.061 SPINAL STENOSIS OF LUMBAR REGION WITHOUT NEUROGENIC CLAUDICATION: Primary | ICD-10-CM

## 2022-04-26 PROCEDURE — 97161 PT EVAL LOW COMPLEX 20 MIN: CPT | Performed by: PHYSICAL THERAPIST

## 2022-04-26 NOTE — TELEPHONE ENCOUNTER
Called and advised pt of all of the below  He  verbalized clear understanding   Call transferred to CS

## 2022-04-26 NOTE — LETTER
2022    Ld Fulton DO   [facility]  [address]    Patient: Veronica Amador   YOB: 1960   Date of Visit: 2022     Encounter Diagnosis     ICD-10-CM    1  Spinal stenosis of lumbar region without neurogenic claudication  M48 061 Ambulatory referral to Physical Therapy   2  Piriformis syndrome of left side  G57 02 Ambulatory referral to Physical Therapy       Dear Dr Jose Henao: Thank you for your recent referral of Veronica Amador  Please review the attached evaluation summary from Pavan's recent visit  Please verify that you agree with the plan of care by signing the attached order  If you have any questions or concerns, please do not hesitate to call  I sincerely appreciate the opportunity to share in the care of one of your patients and hope to have another opportunity to work with you in the near future  Sincerely,    Connie Orourke, PT      Referring Provider:      I certify that I have read the below Plan of Care and certify the need for these services furnished under this plan of treatment while under my care  Ld Fulton DO   [address]          PT Evaluation     Today's date: 2022  Patient name: Veronica Amador  : 1960  MRN: [id number]  Referring provider: Rebekah Powell DO  Dx:   Encounter Diagnosis     ICD-10-CM    1  Spinal stenosis of lumbar region without neurogenic claudication  M48 061 Ambulatory referral to Physical Therapy   2  Piriformis syndrome of left side  G57 02 Ambulatory referral to Physical Therapy                  Assessment  Assessment details: Veronica Amador is a 59 y o  male who presents with pain, decreased strength, decreased ROM, ambulatory dysfunction and postural dysfunction   Due to these impairments, patient has difficulty performing ADL's, recreational activities, ambulation, lifting/carrying  Patient's clinical presentation is consistent with their referring diagnosis of Piriformis syndrome of left side  Plan: Ambulatory referral to Physical Therapy    Chronic pain syndrome  Plan: Ambulatory referral to Physical Therapy    Spinal stenosis of lumbar region without neurogenic claudication  Plan: Ambulatory referral to Physical Therapy    Patient has been educated in home exercise program and plan of care  Patient would benefit from skilled physical therapy services to address their aforementioned functional limitations and progress towards prior level of function and independence with home exercise program      Impairments: abnormal gait, abnormal or restricted ROM, activity intolerance, impaired physical strength, lacks appropriate home exercise program, pain with function, poor posture  and poor body mechanics  Understanding of Dx/Px/POC: good   Prognosis: good    Goals  Short term goals to be accomplished in 3 weeks:  STG 1: Pt will demo independence with postural management  STG 2: Pt will demo I with HEP to maximize progress between therapy sessions  STG 3: Pt will demo L/S AROM < or = min loss throughout to promote improved functional mobility and body mechanics  STG 4: Pt will demo 1/2 MMT grade core stabilizers to improve lumbar stability with functional challenges  STG 5: Pt will reports pain dec freq and intensity 50%    Long term goals to be accomplished in 6 weeks:  LTG 1: Pt will demo good body mech with >75% functional challenges to prevent reinjury  LTG 2: Pt will be able to return to walking in community pain free as per PLOF  LTG 3: Pt will demo Good strength core stabilizers to promote carryover with body mech and posture    Plan  Plan details:  HEP development, stretching, strengthening, A/AA/PROM, joint mobilizations, posture education, STM/MI as needed to reduce muscle tension, muscle reeducation, PLOC discussed and agreed upon with patient        Patient would benefit from: PT eval and skilled physical therapy  Planned modality interventions: cryotherapy, thermotherapy: hydrocollator packs, TENS and traction  Planned therapy interventions: manual therapy, neuromuscular re-education, self care, therapeutic activities, therapeutic exercise and home exercise program  Frequency: 2x week  Duration in weeks: 6  Treatment plan discussed with: patient        Subjective Evaluation    History of Present Illness  Mechanism of injury: Pt reports 2 years ago he developed severe pain in L hip radiating to L calf  Lasted about 2 months of which he was seriously debilitated  He had taken medication and done PT  However this past November he felt this flare up, to a less severity as compared to last time  He is drastically limited with gait, he has been dx with piriformis and has had buttock injections  Pt is scheduled to have epidural injection in 2 weeks, after MRI indicating indeed lumbar spine relation of symptoms  He is scheduled for EMG in 2 weeks as well  Pt is currently retired at this time, 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[employer]  Pt likes to go to the gym to ride bike and exercise with light resistance weight  Lifting arms overhead is very painful, including reaching up and overhead  He denies pain in back  Tightness in hip and sharp stabbing pain in L calf  Washing his hair causes significant LE pain  Stair management is modified, ascends on hands and knees  Pt does acknowledge he has tingling in L calf and nearly constant in L foot and ankle (also feels cold)  Pt feels his gait requires significant attention to prevent slumping forward  Relief of distal symptoms by unweighting L LE, flexing knee and ER L hip per description  Pt taking Aleve twice daily and gabapentin twice daily for pain  Pt sleeps well       Pt goals include returning to gym exercise, walking with his wife and overall activity including lifting UEs overhead pain free  Quality of life: good    Pain  Current pain ratin  At best pain ratin  At worst pain ratin          Objective           Precautions: Standard        Visit 1             22                                                   Neuro Re-Ed             Posture Edu Edu            REIL 10x                                                                             Ther Ex                                                                                                                                                                                                   Modalities

## 2022-04-26 NOTE — PROGRESS NOTES
PT Evaluation     Today's date: 2022  Patient name: Suni Yee  : 1960  MRN: [id number]  Referring provider: Glen Kumar DO  Dx:   Encounter Diagnosis     ICD-10-CM    1  Spinal stenosis of lumbar region without neurogenic claudication  M48 061 Ambulatory referral to Physical Therapy   2  Piriformis syndrome of left side  G57 02 Ambulatory referral to Physical Therapy                  Assessment  Assessment details: Suni Yee is a 59 y o  male who presents with pain, decreased strength, decreased ROM, ambulatory dysfunction and postural dysfunction  Due to these impairments, patient has difficulty performing ADL's, recreational activities, ambulation, lifting/carrying  Patient's clinical presentation is consistent with their referring diagnosis of Piriformis syndrome of left side  Plan: Ambulatory referral to Physical Therapy    Chronic pain syndrome  Plan: Ambulatory referral to Physical Therapy    Spinal stenosis of lumbar region without neurogenic claudication  Plan: Ambulatory referral to Physical Therapy    Patient has been educated in home exercise program and plan of care   Patient would benefit from skilled physical therapy services to address their aforementioned functional limitations and progress towards prior level of function and independence with home exercise program      Impairments: abnormal gait, abnormal or restricted ROM, activity intolerance, impaired physical strength, lacks appropriate home exercise program, pain with function, poor posture  and poor body mechanics  Understanding of Dx/Px/POC: good   Prognosis: good    Goals  Short term goals to be accomplished in 3 weeks:  STG 1: Pt will demo independence with postural management  STG 2: Pt will demo I with HEP to maximize progress between therapy sessions  STG 3: Pt will demo L/S AROM < or = min loss throughout to promote improved functional mobility and body mechanics  STG 4: Pt will demo 1/2 MMT grade core stabilizers to improve lumbar stability with functional challenges  STG 5: Pt will reports pain dec freq and intensity 50%    Long term goals to be accomplished in 6 weeks:  LTG 1: Pt will demo good body mech with >75% functional challenges to prevent reinjury  LTG 2: Pt will be able to return to walking in community pain free as per PLOF  LTG 3: Pt will demo Good strength core stabilizers to promote carryover with body mech and posture    Plan  Plan details:  HEP development, stretching, strengthening, A/AA/PROM, joint mobilizations, posture education, STM/MI as needed to reduce muscle tension, muscle reeducation, PLOC discussed and agreed upon with patient  Patient would benefit from: PT eval and skilled physical therapy  Planned modality interventions: cryotherapy, thermotherapy: hydrocollator packs, TENS and traction  Planned therapy interventions: manual therapy, neuromuscular re-education, self care, therapeutic activities, therapeutic exercise and home exercise program  Frequency: 2x week  Duration in weeks: 6  Treatment plan discussed with: patient        Subjective Evaluation    History of Present Illness  Mechanism of injury: Pt reports 2 years ago he developed severe pain in L hip radiating to L calf  Lasted about 2 months of which he was seriously debilitated  He had taken medication and done PT  However this past November he felt this flare up, to a less severity as compared to last time  He is drastically limited with gait, he has been dx with piriformis and has had buttock injections  Pt is scheduled to have epidural injection in 2 weeks, after MRI indicating indeed lumbar spine relation of symptoms  He is scheduled for EMG in 2 weeks as well  Pt is currently retired at this time, 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[employer]  Pt likes to go to the gym to ride bike and exercise with light resistance weight  Lifting arms overhead is very painful, including reaching up and overhead   He denies pain in back  Tightness in hip and sharp stabbing pain in L calf  Washing his hair causes significant LE pain  Stair management is modified, ascends on hands and knees  Pt does acknowledge he has tingling in L calf and nearly constant in L foot and ankle (also feels cold)  Pt feels his gait requires significant attention to prevent slumping forward  Relief of distal symptoms by unweighting L LE, flexing knee and ER L hip per description  Pt taking Aleve twice daily and gabapentin twice daily for pain  Pt sleeps well  Pt goals include returning to gym exercise, walking with his wife and overall activity including lifting UEs overhead pain free  Quality of life: good    Pain  Current pain ratin  At best pain ratin  At worst pain ratin          Objective           Precautions: Standard        Visit 1             22                                                   Neuro Re-Ed             Posture Edu Edu            REIL 10x                                                                             Ther Ex                                                                                                                                                                                                   Modalities

## 2022-04-28 ENCOUNTER — OFFICE VISIT (OUTPATIENT)
Dept: PHYSICAL THERAPY | Facility: CLINIC | Age: 62
End: 2022-04-28
Payer: COMMERCIAL

## 2022-04-28 DIAGNOSIS — G57.02 PIRIFORMIS SYNDROME OF LEFT SIDE: ICD-10-CM

## 2022-04-28 DIAGNOSIS — M48.061 SPINAL STENOSIS OF LUMBAR REGION WITHOUT NEUROGENIC CLAUDICATION: Primary | ICD-10-CM

## 2022-04-28 PROCEDURE — 97112 NEUROMUSCULAR REEDUCATION: CPT | Performed by: PHYSICAL THERAPIST

## 2022-04-28 NOTE — PROGRESS NOTES
Daily Note     Today's date: 2022  Patient name: Morena Mcdonough  : 1960  MRN: [id number]  Referring provider: Jenniffer Raymond DO  Dx:   Encounter Diagnosis     ICD-10-CM    1  Spinal stenosis of lumbar region without neurogenic claudication  M48 061    2  Piriformis syndrome of left side  G57 02                   Subjective: Pt indictates 50% reduction of distal symptoms since last session  He is continuing to have discomfort in L hip as indicated and ongoing limitations with walking however notices improvement  Objective: See treatment diary below  HEP technique good, progressed to add self OP with lock/blow/sag technique  Discussed self pacing and traffic light guide for moderating activity in gym  Discussed centralization  Assessment: Tolerated treatment well  Patient demo good technique and good comprehension, ongoing symptom irritability persists however remains good prognosis as it relates to provisional classificaiton  Plan: Continue per plan of care  Precautions: Standard        Visit 1 2            22 4 28 22           PA mobs  KW                                     Neuro Re-Ed             Posture Edu Edu Rev           REIL 10x 3x10 plus self OP           Piriformis str  5x10s manual           LAD  Manual 1' x 3           Prone B knee flexion   5x                                     Ther Ex                                                                                                                                                                                                   Modalities

## 2022-05-03 ENCOUNTER — TELEPHONE (OUTPATIENT)
Dept: PAIN MEDICINE | Facility: MEDICAL CENTER | Age: 62
End: 2022-05-03

## 2022-05-03 ENCOUNTER — OFFICE VISIT (OUTPATIENT)
Dept: PHYSICAL THERAPY | Facility: CLINIC | Age: 62
End: 2022-05-03
Payer: COMMERCIAL

## 2022-05-03 DIAGNOSIS — M48.061 SPINAL STENOSIS OF LUMBAR REGION WITHOUT NEUROGENIC CLAUDICATION: Primary | ICD-10-CM

## 2022-05-03 DIAGNOSIS — G57.02 PIRIFORMIS SYNDROME OF LEFT SIDE: ICD-10-CM

## 2022-05-03 PROCEDURE — 97112 NEUROMUSCULAR REEDUCATION: CPT | Performed by: PHYSICAL THERAPIST

## 2022-05-03 NOTE — TELEPHONE ENCOUNTER
Pt called stating that he would like to know if his procedure is covered     Pt can be reached at [phone]

## 2022-05-03 NOTE — PROGRESS NOTES
Daily Note     Today's date: 5/3/2022  Patient name: Clint Saxena  : 1960  MRN: [id number]  Referring provider: Liv Ji DO  Dx:   Encounter Diagnosis     ICD-10-CM    1  Spinal stenosis of lumbar region without neurogenic claudication  M48 061    2  Piriformis syndrome of left side  G57 02                   Subjective: Pt reports that with less HEP performance over weekend he felt symptoms increase, however MIKAEL was effective  Weeding was provocative however HEP resolved symptoms afterward  Pt sympoms have been 2/10, 4/10 at worst with excessive walking and limited ability to perform HEP and stretching, however increased walking by several miles was accomplished  Pt is scheduled for epidural injection Monday  Objective: See treatment diary below  Lumbar AROM: WNL slight pain with R SGIS in L hip  Trial overhead challenges to establish symptom irritability, nil provocation today with MIKAEL between sets  Gym exercises reviewed, encouraged to progress  Assessment: Tolerated treatment well  Patient demo excellent response to est POC  He continue to have very mild distal symptoms hwever able to abolish proximal symptoms more often, and overall intensity is of significantly less noticablility  Pt is walking more and exercising, interesting in progressing  Plan: Continue per plan of care  Precautions: Standard        Visit 1 2 3           22 4 28 22 5/3/22          PA mobs  KW KW                                    Neuro Re-Ed             Posture Edu Edu Rev           REIL 10x 3x10 plus self OP 4x10          Piriformis str  5x10s manual Manual          LAD  Manual 1' x 3           Prone B knee flexion   5x 15x                                    Ther Ex             Seated OP press    5lbs 2x8 Modalities

## 2022-05-05 ENCOUNTER — OFFICE VISIT (OUTPATIENT)
Dept: PHYSICAL THERAPY | Facility: CLINIC | Age: 62
End: 2022-05-05
Payer: COMMERCIAL

## 2022-05-05 DIAGNOSIS — M48.061 SPINAL STENOSIS OF LUMBAR REGION WITHOUT NEUROGENIC CLAUDICATION: Primary | ICD-10-CM

## 2022-05-05 DIAGNOSIS — G57.02 PIRIFORMIS SYNDROME OF LEFT SIDE: ICD-10-CM

## 2022-05-05 PROCEDURE — 97112 NEUROMUSCULAR REEDUCATION: CPT | Performed by: PHYSICAL THERAPIST

## 2022-05-05 NOTE — PROGRESS NOTES
Daily Note     Today's date: 2022  Patient name: Bella Arguello  : 1960  MRN: [id number]  Referring provider: Jackelyn Cowart DO  Dx:   Encounter Diagnosis     ICD-10-CM    1  Spinal stenosis of lumbar region without neurogenic claudication  M48 061    2  Piriformis syndrome of left side  G57 02                   Subjective: Pt returned to gym yesterday and since then is having an increase in L buttock pain and some sensitivity in distal LE after having increased overhead resistance exercises  He acknowledges during tojosesito's session he finds relief with manual OP and in positions of extension however limited carryover today, 4-5/10 at worst L buttock  Pt is scheduled for epidural Monday  Objective: See treatment diary below  Ongoing inc ROM in lumbar spine however distal sensitivity having been irritated from progressed overhead activity  Continues to respond to extension based exercises, attempted intro L SGIS however peripheralized  Assessment: Tolerated treatment fair  Patient overall able to dec/NB with repeated motions including REIL with clin OP and sustained extension over wedge however overall mechanical progression continue todemo good prognosis for LTGs  Plan: Continue per plan of care  Precautions: Standard        Visit 1 2 3 5          22 4 28 22 5/3/22 5/5/22         PA mobs  KW KW KW                                   Neuro Re-Ed             Posture Edu Edu Rev           REIL 10x 3x10 plus self OP 4x10 4x10 added clin OP         Piriformis str  5x10s manual Manual Manual         LAD  Manual 1' x 3  2'         Prone B knee flexion   5x 15x 15x         MIKAEL    Mod hands on table top 10x                      Ther Ex             Seated OP press    5lbs 2x8                                                                                                                                                                                   Modalities

## 2022-05-09 ENCOUNTER — HOSPITAL ENCOUNTER (OUTPATIENT)
Dept: RADIOLOGY | Facility: CLINIC | Age: 62
Discharge: HOME | End: 2022-05-09
Attending: PHYSICAL MEDICINE & REHABILITATION | Admitting: PHYSICAL MEDICINE & REHABILITATION
Payer: COMMERCIAL

## 2022-05-09 VITALS
RESPIRATION RATE: 18 BRPM | DIASTOLIC BLOOD PRESSURE: 75 MMHG | OXYGEN SATURATION: 97 % | TEMPERATURE: 97.8 F | SYSTOLIC BLOOD PRESSURE: 132 MMHG | HEART RATE: 71 BPM

## 2022-05-09 DIAGNOSIS — M54.16 LUMBAR RADICULOPATHY: ICD-10-CM

## 2022-05-09 PROCEDURE — 62323 NJX INTERLAMINAR LMBR/SAC: CPT | Performed by: PHYSICAL MEDICINE & REHABILITATION

## 2022-05-09 PROCEDURE — A9585 GADOBUTROL INJECTION: HCPCS | Performed by: PHYSICAL MEDICINE & REHABILITATION

## 2022-05-09 RX ORDER — METHYLPREDNISOLONE ACETATE 80 MG/ML
80 INJECTION, SUSPENSION INTRA-ARTICULAR; INTRALESIONAL; INTRAMUSCULAR; PARENTERAL; SOFT TISSUE ONCE
Status: COMPLETED | OUTPATIENT
Start: 2022-05-09 | End: 2022-05-09

## 2022-05-09 RX ADMIN — GADOBUTROL 1 ML: 604.72 INJECTION INTRAVENOUS at 14:52

## 2022-05-09 RX ADMIN — METHYLPREDNISOLONE ACETATE 80 MG: 80 INJECTION, SUSPENSION INTRA-ARTICULAR; INTRALESIONAL; INTRAMUSCULAR; PARENTERAL; SOFT TISSUE at 14:53

## 2022-05-09 NOTE — H&P
History of Present Illness: The patient is a 59 y o  male who presents with complaints of low back pain    Patient Active Problem List   Diagnosis    Piriformis syndrome of left side    Chronic pain syndrome       Past Medical History:   Diagnosis Date    Elevated blood pressure reading     Last assessed: 3/24/15    Exposure to SARS-associated coronavirus 11/12/2020       Past Surgical History:   Procedure Laterality Date    KNEE ARTHROSCOPY Left     UMBILICAL HERNIA REPAIR  1985         Current Outpatient Medications:     gabapentin (Neurontin) 100 mg capsule, Take 1 capsule (100 mg total) by mouth 3 (three) times a day, Disp: 90 capsule, Rfl: 1    methocarbamol (ROBAXIN) 500 mg tablet, Take 1 tablet (500 mg total) by mouth 2 (two) times a day as needed for muscle spasms, Disp: 30 tablet, Rfl: 0    Naproxen Sodium (ALEVE PO), Take 200 mg by mouth 2 (two) times a day, Disp: , Rfl:     PreviDent 5000 Booster Plus 1 1 % PSTE, BRUSH NORMALLY AND EXPECTORATE BUT DO NOT RINSE AFTER   DO NOT EAT OR DRINK ANYTHING FOR AT LEAST 30 MINUTES AFTER USE , Disp: , Rfl:     Current Facility-Administered Medications:     Gadobutrol injection (SINGLE-DOSE) SOLN 10 mL, 10 mL, Intravenous, Once, Penny Penaloza DO    methylPREDNISolone acetate (DEPO-MEDROL) injection 80 mg, 80 mg, Epidural, Once, Penny Penaloza DO    Allergies   Allergen Reactions    Oxycodone-Acetaminophen Nausea Only       Physical Exam:   Vitals:    05/09/22 1439   BP: 129/79   Pulse: 64   Resp: 18   Temp: 97 8 °F (36 6 °C)   SpO2: 98%     General: Awake, Alert, Oriented x 3, Mood and affect appropriate  Respiratory: Respirations even and unlabored  Cardiovascular: Peripheral pulses intact; no edema  Musculoskeletal Exam:  Tenderness to palpation bilateral lumbar paraspinals  ASA Score: 2    Patient/Chart Verification  Patient ID Verified: Verbal  ID Band Applied: No  Consents Confirmed: Procedural,To be obtained in the Pre-Procedure area  H&P( within 30 days) Verified: To be obtained in the Pre-Procedure area  Allergies Reviewed: Yes  Anticoag/NSAID held?: No  Currently on antibiotics?: No    Assessment:   1   Lumbar radiculopathy        Plan: L4 L5 LESI

## 2022-05-10 ENCOUNTER — APPOINTMENT (OUTPATIENT)
Dept: PHYSICAL THERAPY | Facility: CLINIC | Age: 62
End: 2022-05-10
Payer: COMMERCIAL

## 2022-05-12 ENCOUNTER — OFFICE VISIT (OUTPATIENT)
Dept: PHYSICAL THERAPY | Facility: CLINIC | Age: 62
End: 2022-05-12
Payer: COMMERCIAL

## 2022-05-12 DIAGNOSIS — G57.02 PIRIFORMIS SYNDROME OF LEFT SIDE: Primary | ICD-10-CM

## 2022-05-12 DIAGNOSIS — M48.061 SPINAL STENOSIS OF LUMBAR REGION WITHOUT NEUROGENIC CLAUDICATION: ICD-10-CM

## 2022-05-12 PROCEDURE — 97112 NEUROMUSCULAR REEDUCATION: CPT | Performed by: PHYSICAL THERAPIST

## 2022-05-12 NOTE — PROGRESS NOTES
Daily Note     Today's date: 2022  Patient name: Marga Martinez  : 1960  MRN: [id number]  Referring provider: Radha Allison DO  Dx:   Encounter Diagnosis     ICD-10-CM    1  Piriformis syndrome of left side  G57 02    2  Spinal stenosis of lumbar region without neurogenic claudication  M48 061                   Subjective: Pt had his epidural injection on Monday, reports overall doing well, not having pain  Her plans on cancelling his EMG next month as he is feeling significant improvement at this time  Objective: See treatment diary below  Progressed activity to include lower body at gym, B LE ext and ham curls in sitting, also rec bike  Assessment: Tolerated treatment well  Patient demo excellent response to est POC and good body mech wiht rveiew of functional tasks including lawen mowing  Pt demo good understanding of self progression of exercises at the gym  Plan: Continue per plan of care  Pt will progress to recovery of fucntion and prveention next session  Precautions: Standard        Visit 3 4 5 6    5/3/22 5/5/22 5/10/22 5/12   PA mobs KW KW KW KW                 Neuro Re-Ed       Posture Edu       REIL 4x10 4x10 added clin OP  3x10   Piriformis str Manual Manual     LAD  2'     Prone B knee flexion  15x 15x     MIKAEL  Mod hands on table top 10x  Rev          Ther Ex       Seated OP press  5lbs 2x8   Rev   LE therex at gym/equip    10'   Functional act    10' Lawn mow 30lbs + on sled   Rec bike    5'                                                                         Modalities

## 2022-05-16 ENCOUNTER — TELEPHONE (OUTPATIENT)
Dept: PAIN MEDICINE | Facility: CLINIC | Age: 62
End: 2022-05-16

## 2022-05-17 ENCOUNTER — OFFICE VISIT (OUTPATIENT)
Dept: PHYSICAL THERAPY | Facility: CLINIC | Age: 62
End: 2022-05-17
Payer: COMMERCIAL

## 2022-05-17 DIAGNOSIS — G57.02 PIRIFORMIS SYNDROME OF LEFT SIDE: ICD-10-CM

## 2022-05-17 DIAGNOSIS — M48.061 SPINAL STENOSIS OF LUMBAR REGION WITHOUT NEUROGENIC CLAUDICATION: Primary | ICD-10-CM

## 2022-05-17 PROCEDURE — 97112 NEUROMUSCULAR REEDUCATION: CPT | Performed by: PHYSICAL THERAPIST

## 2022-05-17 NOTE — PROGRESS NOTES
Daily Note     Today's date: 2022  Patient name: Carol Burgos  : 1960  MRN: [id number]  Referring provider: Valencia Bynum DO  Dx:   Encounter Diagnosis     ICD-10-CM    1  Spinal stenosis of lumbar region without neurogenic claudication  M48 061    2  Piriformis syndrome of left side  G57 02                   Subjective: Pt reports overall having returned to gym and adding lower body and recumbent bike successfully  Objective: See treatment diary below  Attempt squat reintroduction      Assessment: Tolerated treatment well  Patient demo symtom irrtiability with squat reintroduction  Will f/u in 1 week  Pt demo excelent comprehensio of POC      Plan: Continue per plan of care  Precautions: Standard        Visit 3 4 5 6 7    5/3/22 5/5/22 5/10/22 5/12 5/17   PA mobs KW KW KW KW KW                   Neuro Re-Ed        Posture Edu        REIL 4x10 4x10 added clin OP  3x10 3x10   Piriformis str Manual Manual      LAD  2'      Prone B knee flexion  15x 15x      MIKAEL  Mod hands on table top 10x  Rev            Ther Ex        Seated OP press  5lbs 2x8   Rev    LE therex at gym/equip    10'    Functional act    10' Lawn mow 30lbs + on sled 15'   Rec bike    5'    Squat     Ball on wall 10x, to chair 5x2, to chair qith FWs 5lb x5                                                                           Modalities

## 2022-05-19 ENCOUNTER — APPOINTMENT (OUTPATIENT)
Dept: PHYSICAL THERAPY | Facility: CLINIC | Age: 62
End: 2022-05-19
Payer: COMMERCIAL

## 2022-05-24 ENCOUNTER — OFFICE VISIT (OUTPATIENT)
Dept: PHYSICAL THERAPY | Facility: CLINIC | Age: 62
End: 2022-05-24
Payer: COMMERCIAL

## 2022-05-24 DIAGNOSIS — M48.061 SPINAL STENOSIS OF LUMBAR REGION WITHOUT NEUROGENIC CLAUDICATION: Primary | ICD-10-CM

## 2022-05-24 DIAGNOSIS — G57.02 PIRIFORMIS SYNDROME OF LEFT SIDE: ICD-10-CM

## 2022-05-24 PROCEDURE — 97112 NEUROMUSCULAR REEDUCATION: CPT | Performed by: PHYSICAL THERAPIST

## 2022-05-24 NOTE — PROGRESS NOTES
Daily Note     Today's date: 2022  Patient name: Deatrice Hamman  : 1960  MRN: [id number]  Referring provider: Sarah Beth Correia DO  Dx:   Encounter Diagnosis     ICD-10-CM    1  Spinal stenosis of lumbar region without neurogenic claudication  M48 061    2  Piriformis syndrome of left side  G57 02                   Subjective: Pt reports having a bit of an exacerbation this weekend which may have been related to multiple variables of progression related to gym exercise, stair master, bicycling at a stronger level and increasing the weight for both upper and lower body exercise as well as walking up hills  Pt is continuing to perform HEP 10x/day and did not find significant abolishment with HEP this weekend  Objective: See treatment diary below  Symptom reduction successful in clinic today with inc OP and clin mob  Bike with inc resistsance (L12) provoke distal symptoms slightly after 6', immediate dec after cessation, MIKAEL with self OP elbows on wall abolish successfully  Inc time spent today discussing prophylaxis  Assessment: Tolerated treatment well  Patient demo ongoing good response to est POC and good comprehension of self management  Pt demo good potential to continue to make progress, will begin to wean from therapy at this time  Plan: Continue per plan of care  Precautions: Standard        Visit 4 5 6 7 8    5/5/22 5/10/22 5/12 5/17 5/24   PA mobs KW KW KW KW KW                   Neuro Re-Ed        Posture Edu        REIL 4x10 added clin OP  3x10 3x10 3x10   Piriformis str Manual       LAD 2'       Prone B knee flexion  15x       MIKAEL Mod hands on table top 10x  Rev             Ther Ex        Seated OP press    Rev     LE therex at gym/equip   10'     Functional act   10' Lawn mow 30lbs + on sled 15' Uphill walking   Rec bike   5'  6' upright L 12 fwd lean   Squat    Ball on wall 10x, to chair 5x2, to chair qith FWs 5lb x5 Modalities

## 2022-05-26 ENCOUNTER — APPOINTMENT (OUTPATIENT)
Dept: PHYSICAL THERAPY | Facility: CLINIC | Age: 62
End: 2022-05-26
Payer: COMMERCIAL

## 2022-05-31 ENCOUNTER — EVALUATION (OUTPATIENT)
Dept: PHYSICAL THERAPY | Facility: CLINIC | Age: 62
End: 2022-05-31
Payer: COMMERCIAL

## 2022-05-31 DIAGNOSIS — M48.061 SPINAL STENOSIS OF LUMBAR REGION WITHOUT NEUROGENIC CLAUDICATION: Primary | ICD-10-CM

## 2022-05-31 DIAGNOSIS — G57.02 PIRIFORMIS SYNDROME OF LEFT SIDE: ICD-10-CM

## 2022-05-31 PROCEDURE — 97112 NEUROMUSCULAR REEDUCATION: CPT | Performed by: PHYSICAL THERAPIST

## 2022-05-31 NOTE — PROGRESS NOTES
PT Re-Evaluation     Today's date: 2022  Patient name: Delfina Mancini  : 1960  MRN: [id number]  Referring provider: Russ David DO  Dx:   Encounter Diagnosis     ICD-10-CM    1  Spinal stenosis of lumbar region without neurogenic claudication  M48 061    2  Piriformis syndrome of left side  G57 02                   Assessment  Assessment details: Delfina Mancini is a 59 y o  male who has been regularly participating in skilled PT to maximize return to PLOF from his low back pain and radiating symptoms through L LE  At this time pt has shown excellent progression and with slight residual symptoms in distal LE he is returning to majority of all LTGs  This week pt will begin to wean from PT and demo excellent prognosis for further progression and self management  Cont with skilled PT to maximize DC and I HEP completion     Impairments: abnormal gait, abnormal or restricted ROM, activity intolerance, impaired physical strength, lacks appropriate home exercise program, pain with function, poor posture  and poor body mechanics  Understanding of Dx/Px/POC: good   Prognosis: good    Goals  Short term goals to be accomplished in 3 weeks: -met  STG 1: Pt will demo independence with postural management  STG 2: Pt will demo I with HEP to maximize progress between therapy sessions  STG 3: Pt will demo L/S AROM < or = min loss throughout to promote improved functional mobility and body mechanics  STG 4: Pt will demo 1/2 MMT grade core stabilizers to improve lumbar stability with functional challenges  STG 5: Pt will reports pain dec freq and intensity 50%    Long term goals to be accomplished in 6 weeks:-ongoing  LTG 1: Pt will demo good body mech with >75% functional challenges to prevent reinjury  LTG 2: Pt will be able to return to walking in community pain free as per PLOF  LTG 3: Pt will demo Good strength core stabilizers to promote carryover with body mech and posture    Plan  Plan details: HEP development, stretching, strengthening, A/AA/PROM, joint mobilizations, posture education, STM/MI as needed to reduce muscle tension, muscle reeducation, PLOC discussed and agreed upon with patient  Patient would benefit from: PT eval and skilled physical therapy  Planned modality interventions: cryotherapy, thermotherapy: hydrocollator packs, TENS and traction  Planned therapy interventions: manual therapy, neuromuscular re-education, self care, therapeutic activities, therapeutic exercise and home exercise program  Frequency: 2x week  Duration in weeks: 6  Treatment plan discussed with: patient        Subjective Evaluation    History of Present Illness  Mechanism of injury: Pt acknowledges feeling he is near 80% of his PLOF at this time, deficits relate to having slight soreness following heavy yardwork  He is back to walking normal distances including with hills  HE has been riding his bike  H has yet to return to long bike rides though and stair climber at gym  Only symptoms are trace tingling in L toes, intermittently  Quality of life: good    Pain  Current pain ratin  At best pain ratin  At worst pain ratin          Objective    Lumbar AROM: WFL painfree  Core strength: Fair  Squat technique Fair        Eval/ Re-eval Auth #/ Referral # Total visits Start date  Expiration date Total active units  Total manual units  PT only or  PT+OT?     12                                                             Precautions: Standard      Visit 5 6 7 8 9    5/10/22 5/12 5/17 5/24 5/31   PA mobs KW KW KW KW KW                   Neuro Re-Ed        Posture Edu        REIL  3x10 3x10 3x10 3x10   Piriformis str        LAD        Prone B knee flexion         MIKAEL  Rev              Ther Ex        Seated OP press   Rev      LE therex at gym/equip  10'      Functional act  10' Lawn mow 30lbs + on sled 15' Uphill walking Rev   Rec bike  5'  6' upright L 12 fwd lean Rev   Squat   Ball on wall 10x, to chair 5x2, to chair kapil FWs 5lb x5  Free sq 13' with free weights                                                                           Modalities

## 2022-06-16 ENCOUNTER — OFFICE VISIT (OUTPATIENT)
Dept: PHYSICAL THERAPY | Facility: CLINIC | Age: 62
End: 2022-06-16
Payer: COMMERCIAL

## 2022-06-16 DIAGNOSIS — G57.02 PIRIFORMIS SYNDROME OF LEFT SIDE: ICD-10-CM

## 2022-06-16 DIAGNOSIS — M48.061 SPINAL STENOSIS OF LUMBAR REGION WITHOUT NEUROGENIC CLAUDICATION: Primary | ICD-10-CM

## 2022-06-16 PROCEDURE — 97112 NEUROMUSCULAR REEDUCATION: CPT | Performed by: PHYSICAL THERAPIST

## 2022-06-16 NOTE — PROGRESS NOTES
Daily Note     Today's date: 2022  Patient name: Suni Yee  : 1960  MRN: [id number]  Referring provider: Glen Kumar DO  Dx:   Encounter Diagnosis     ICD-10-CM    1  Spinal stenosis of lumbar region without neurogenic claudication  M48 061    2  Piriformis syndrome of left side  G57 02                   Subjective: Pt reports a significant set back after a day of yardwork and gym exercise followed by a flight the following day  He has since reverted ot only being able to walk very short distances due to extreme pain  Pain and tightness in L buttock, pain in L calf  Tingling in toes has worsened and is numb  His HEP is helpful for only a short duration  Objective: See treatment diary below  Flexion and extension WFL painful return to neutral with extension  R SGIS WNL, L SGIS mod loss  Treatment in frontal plane initiated today, HEP updated  Assessment: Tolerated treatment well  Patient has experienced a major set back however has shown mechanical response today in the clinic with distal most symptoms indicating good-fair prognosis  Pt demo good comprehension of HEP update  Plan: Continue per plan of care  follow up with patient next week after frontal plane exercises  Eval/ Re-eval Auth #/ Referral # Total visits Start date  Expiration date Total active units  Total manual units  PT only or  PT+OT?     12                                                             Precautions: Standard      Visit 6 7 8 9 10       PA mobs KW KW KW KW Lateral OP lumbar, belt mob                   Neuro Re-Ed        Posture Edu        REIL 3x10 3x10 3x10 3x10 With hips off center   Piriformis str        L SGIS     At wall 2x5   Prone B knee flexion         MIKAEL Rev               Ther Ex        Seated OP press  Rev       LE therex at gym/equip 10'       Functional act 10' Lawn mow 30lbs + on sled 15' Uphill walking Rev    Rec bike 5'  6' upright L 12 fwd lean Rev    Squat  Ball on wall 10x, to chair 5x2, to chair qith FWs 5lb x5  Free sq 13' with free weights                                                                            Modalities

## 2022-06-23 ENCOUNTER — OFFICE VISIT (OUTPATIENT)
Dept: PHYSICAL THERAPY | Facility: CLINIC | Age: 62
End: 2022-06-23
Payer: COMMERCIAL

## 2022-06-23 DIAGNOSIS — M48.061 SPINAL STENOSIS OF LUMBAR REGION WITHOUT NEUROGENIC CLAUDICATION: Primary | ICD-10-CM

## 2022-06-23 DIAGNOSIS — G57.02 PIRIFORMIS SYNDROME OF LEFT SIDE: ICD-10-CM

## 2022-06-23 PROCEDURE — 97112 NEUROMUSCULAR REEDUCATION: CPT | Performed by: PHYSICAL THERAPIST

## 2022-06-23 NOTE — LETTER
2022    Laura Barakat DO  [facility]  [address]    Patient: Rhona Singh   YOB: 1960   Date of Visit: 2022     Encounter Diagnosis     ICD-10-CM    1  Spinal stenosis of lumbar region without neurogenic claudication  M48 061    2  Piriformis syndrome of left side  G57 02        Dear Dr Ibis Martin: Thank you for your recent referral of Rhona Singh  Please review the attached evaluation summary from Pavan's recent visit  Please verify that you agree with the plan of care by signing the attached order  If you have any questions or concerns, please do not hesitate to call  I sincerely appreciate the opportunity to share in the care of one of your patients and hope to have another opportunity to work with you in the near future  Sincerely,    Kwame Pastor, PT      Referring Provider:      I certify that I have read the below Plan of Care and certify the need for these services furnished under this plan of treatment while under my care  [facility]  [address]  Via Fax: [phone]          PT Re-Evaluation     Today's date: 2022  Patient name: Rhona Singh  : 1960  MRN: [id number]  Referring provider: Sarah Wade DO  Dx:   Encounter Diagnosis     ICD-10-CM    1  Spinal stenosis of lumbar region without neurogenic claudication  M48 061    2  Piriformis syndrome of left side  G57 02                   Assessment  Assessment details: Rhona Singh is a 59 y o  male who has been regularly participating in skilled PT to maximize return to PLOF from his low back pain and radiating symptoms through L LE   At this time pt has shown excellent progression and with slight residual symptoms in distal LE he is returning to majority of all LTGs, however has experienced a significant set back which has created a major deviation from LTG accomplishment at this itme  Pt current care plan has not been addressing said symptoms to as new tretament approach is derived to accomplish symptom reduction and mechanical response in this time  PT would require ongoing skilled PT to return to prior status and transition to I HEP and self management  Impairments: abnormal gait, abnormal or restricted ROM, activity intolerance, impaired physical strength, lacks appropriate home exercise program, pain with function, poor posture  and poor body mechanics  Understanding of Dx/Px/POC: good   Prognosis: good    Goals  Short term goals to be accomplished in 3 weeks: -met  STG 1: Pt will demo independence with postural management  STG 2: Pt will demo I with HEP to maximize progress between therapy sessions  STG 3: Pt will demo L/S AROM < or = min loss throughout to promote improved functional mobility and body mechanics  STG 4: Pt will demo 1/2 MMT grade core stabilizers to improve lumbar stability with functional challenges  STG 5: Pt will reports pain dec freq and intensity 50%    Long term goals to be accomplished in 6 weeks:-ongoing  LTG 1: Pt will demo good body mech with >75% functional challenges to prevent reinjury  LTG 2: Pt will be able to return to walking in community pain free as per PLOF  LTG 3: Pt will demo Good strength core stabilizers to promote carryover with body mech and posture    Plan  Plan details:  HEP development, stretching, strengthening, A/AA/PROM, joint mobilizations, posture education, STM/MI as needed to reduce muscle tension, muscle reeducation, PLOC discussed and agreed upon with patient        Patient would benefit from: PT eval and skilled physical therapy  Planned modality interventions: cryotherapy, thermotherapy: hydrocollator packs, TENS and traction  Planned therapy interventions: manual therapy, neuromuscular re-education, self care, therapeutic activities, therapeutic exercise and home exercise program  Frequency: 2x week  Duration in weeks: 6  Treatment plan discussed with: patient        Subjective Evaluation    History of Present Illness  Mechanism of injury: Pt acknowledges a major set back at this time and is disappointed with inability to walk >20' without pain peripheralization and increase  HE is limping and unable to acocmplish yard work or household work  He is acknowledging current exercises are not reducing pain as they had been  Quality of life: good    Pain  Current pain ratin  At best pain ratin  At worst pain ratin          Objective    Lumbar AROM: WFL painfree  Core strength: Fair  Squat technique Fair        Eval/ Re-eval Auth #/ Referral # Total visits Start date  Expiration date Total active units  Total manual units  PT only or  PT+OT?     12                                                             Precautions: Standard      Visit 7 8 9 10 11       PA mobs KW KW KW Lateral OP lumbar, belt mob    Flex Rot mob     KW, R           Neuro Re-Ed        Posture Edu        REIL 3x10 3x10 3x10 With hips off center    Piriformis str        L SGIS    At wall 2x5 Testing   Prone B knee flexion         MIKAEL        Flexion rotation in L SL     5x10-15, sustained 1x   Ther Ex        Seated OP press         LE therex at gym/equip        Functional act 15' Uphill walking Rev     Rec bike  6' upright L 12 fwd lean Rev     Squat Ball on wall 10x, to chair 5x2, to chair qith FWs 5lb x5  Free sq 13' with free weights                                                                             Modalities

## 2022-06-23 NOTE — PROGRESS NOTES
PT Re-Evaluation     Today's date: 2022  Patient name: Delfina Mancini  : 1960  MRN: [id number]  Referring provider: Russ David DO  Dx:   Encounter Diagnosis     ICD-10-CM    1  Spinal stenosis of lumbar region without neurogenic claudication  M48 061    2  Piriformis syndrome of left side  G57 02                   Assessment  Assessment details: Delfina Mancini is a 59 y o  male who has been regularly participating in skilled PT to maximize return to PLOF from his low back pain and radiating symptoms through L LE  At this time pt has shown excellent progression and with slight residual symptoms in distal LE he is returning to majority of all LTGs, however has experienced a significant set back which has created a major deviation from LTG accomplishment at this itme  Pt current care plan has not been addressing said symptoms to as new tretament approach is derived to accomplish symptom reduction and mechanical response in this time  PT would require ongoing skilled PT to return to prior status and transition to I HEP and self management     Impairments: abnormal gait, abnormal or restricted ROM, activity intolerance, impaired physical strength, lacks appropriate home exercise program, pain with function, poor posture  and poor body mechanics  Understanding of Dx/Px/POC: good   Prognosis: good    Goals  Short term goals to be accomplished in 3 weeks: -met  STG 1: Pt will demo independence with postural management  STG 2: Pt will demo I with HEP to maximize progress between therapy sessions  STG 3: Pt will demo L/S AROM < or = min loss throughout to promote improved functional mobility and body mechanics  STG 4: Pt will demo 1/2 MMT grade core stabilizers to improve lumbar stability with functional challenges  STG 5: Pt will reports pain dec freq and intensity 50%    Long term goals to be accomplished in 6 weeks:-ongoing  LTG 1: Pt will demo good body mech with >75% functional challenges to prevent reinjury  LTG 2: Pt will be able to return to walking in community pain free as per PLOF  LTG 3: Pt will demo Good strength core stabilizers to promote carryover with body mech and posture    Plan  Plan details:  HEP development, stretching, strengthening, A/AA/PROM, joint mobilizations, posture education, STM/MI as needed to reduce muscle tension, muscle reeducation, PLOC discussed and agreed upon with patient  Patient would benefit from: PT eval and skilled physical therapy  Planned modality interventions: cryotherapy, thermotherapy: hydrocollator packs, TENS and traction  Planned therapy interventions: manual therapy, neuromuscular re-education, self care, therapeutic activities, therapeutic exercise and home exercise program  Frequency: 2x week  Duration in weeks: 6  Treatment plan discussed with: patient        Subjective Evaluation    History of Present Illness  Mechanism of injury: Pt acknowledges a major set back at this time and is disappointed with inability to walk >20' without pain peripheralization and increase  HE is limping and unable to acocmplish yard work or household work  He is acknowledging current exercises are not reducing pain as they had been  Quality of life: good    Pain  Current pain ratin  At best pain ratin  At worst pain ratin          Objective    Lumbar AROM: WFL painfree  Core strength: Fair  Squat technique Fair        Eval/ Re-eval Auth #/ Referral # Total visits Start date  Expiration date Total active units  Total manual units  PT only or  PT+OT?     12                                                             Precautions: Standard      Visit 7 8 9 10 11       PA mobs KW KW KW Lateral OP lumbar, belt mob    Flex Rot mob     KW, R           Neuro Re-Ed        Posture Edu        REIL 3x10 3x10 3x10 With hips off center    Piriformis str        L SGIS    At wall 2x5 Testing   Prone B knee flexion         MIKAEL Flexion rotation in L SL     5x10-15, sustained 1x   Ther Ex        Seated OP press         LE therex at gym/equip        Functional act 15' Uphill walking Rev     Rec bike  6' upright L 12 fwd lean Rev     Squat Ball on wall 10x, to chair 5x2, to chair qith FWs 5lb x5  Free sq 13' with free weights                                                                             Modalities

## 2022-06-23 NOTE — PROGRESS NOTES
Daily Note     Today's date: 2022  Patient name: Ingrid Hutchinson  : 1960  MRN: [id number]  Referring provider: Deborah Villalpando DO  Dx:   Encounter Diagnosis     ICD-10-CM    1  Spinal stenosis of lumbar region without neurogenic claudication  M48 061    2  Piriformis syndrome of left side  G57 02                   Subjective: ***      Objective: See treatment diary below      Assessment: Tolerated treatment {Tolerated treatment :8066266270}  Patient {assessment:0196770628}      Plan: {PLAN:2840380447}     Eval/ Re-eval Auth #/ Referral # Total visits Start date  Expiration date Total active units  Total manual units  PT only or  PT+OT?     12                                                             Precautions: Standard      Visit 7 8 9 10 11        PA mobs KW KW KW Lateral OP lumbar, belt mob                    Neuro Re-Ed        Posture Edu        REIL 3x10 3x10 3x10 With hips off center    Piriformis str        L SGIS    At wall 2x5    Prone B knee flexion         MIKAEL                Ther Ex        Seated OP press         LE therex at gym/equip        Functional act 15' Uphill walking Rev     Rec bike  6' upright L 12 fwd lean Rev     Squat Ball on wall 10x, to chair 5x2, to chair qith FWs 5lb x5  Free sq 13' with free weights                                                                             Modalities

## 2022-06-28 ENCOUNTER — OFFICE VISIT (OUTPATIENT)
Dept: PHYSICAL THERAPY | Facility: CLINIC | Age: 62
End: 2022-06-28
Payer: COMMERCIAL

## 2022-06-28 DIAGNOSIS — M48.061 SPINAL STENOSIS OF LUMBAR REGION WITHOUT NEUROGENIC CLAUDICATION: Primary | ICD-10-CM

## 2022-06-28 DIAGNOSIS — G57.02 PIRIFORMIS SYNDROME OF LEFT SIDE: ICD-10-CM

## 2022-06-28 PROCEDURE — 97112 NEUROMUSCULAR REEDUCATION: CPT | Performed by: PHYSICAL THERAPIST

## 2022-06-28 NOTE — PROGRESS NOTES
Daily Note     Today's date: 2022  Patient name: Kenyon Guzman  : 1960  MRN: [id number]  Referring provider: Paulette Quan DO  Dx:   Encounter Diagnosis     ICD-10-CM    1  Spinal stenosis of lumbar region without neurogenic claudication  M48 061    2  Piriformis syndrome of left side  G57 02                   Subjective: Pt demo good reduction of symptoms feeling nearly nil symptoms in toes, 2/10 pain in calf and nearly nil symptoms in L hip and buttock  Pt is pleased to report that side glides ave been effective for him  Objective: See treatment diary below  HEP updated with potential to transition from lateral to saggital plane  Inc time spent assessing and developing HEP progression during today's session  Assessment: Tolerated treatment well  Patient demo good response to est POC, and is centralizing well with good comprehension of HEP  Plan: Continue per plan of care  Eval/ Re-eval Auth #/ Referral # Total visits Start date  Expiration date Total active units  Total manual units  PT only or  PT+OT?     30                                                             Precautions: Standard      Visit 8 9 10 11 12       PA mobs KW KW Lateral OP lumbar, belt mob  KW   Flex Rot mob    KW, R            Neuro Re-Ed        Posture Edu        REIL 3x10 3x10 With hips off center     Piriformis str        L SGIS   At wall 2x5 Testing 3x10   Prone B knee flexion         MIKAEL        Flexion rotation in L SL    5x10-15, sustained 1x    Ther Ex        Seated OP press         LE therex at gym/equip        Functional act Uphill walking Rev      Rec bike 6' upright L 12 fwd lean Rev      Squat  Free sq 13' with free weights                                                                              Modalities

## 2022-07-05 ENCOUNTER — OFFICE VISIT (OUTPATIENT)
Dept: PHYSICAL THERAPY | Facility: CLINIC | Age: 62
End: 2022-07-05
Payer: COMMERCIAL

## 2022-07-05 DIAGNOSIS — M48.061 SPINAL STENOSIS OF LUMBAR REGION WITHOUT NEUROGENIC CLAUDICATION: Primary | ICD-10-CM

## 2022-07-05 DIAGNOSIS — G57.02 PIRIFORMIS SYNDROME OF LEFT SIDE: ICD-10-CM

## 2022-07-05 PROCEDURE — 97112 NEUROMUSCULAR REEDUCATION: CPT | Performed by: PHYSICAL THERAPIST

## 2022-07-05 NOTE — PROGRESS NOTES
Daily Note     Today's date: 2022  Patient name: Kylah Fofana  : 1960  MRN: [id number]  Referring provider: Vinh Lamar DO  Dx:   Encounter Diagnosis     ICD-10-CM    1  Spinal stenosis of lumbar region without neurogenic claudication  M48 061    2  Piriformis syndrome of left side  G57 02                   Subjective: Pt reports that overall his extremity symptoms have drastically reduced, however do remain present 1-2/10 in calf, consistently 1-2/10 in toes  He has slight L hip and buttock soreness  He finds that his long distance walking is gradually improving, far less freq of stopping due to pain  Objective: See treatment diary below  Progressed HEP from flex/rot and SGIS to SGIS and REIL  Instructions in how to wean back to sagittal plane  Assessment: Tolerated treatment well  Patient demo steady resolution of symptoms at this time, also demo good comprehension of est HEP  Plan: Continue per plan of care  Pt will f/u via email next week, transitioning to I management at this time  Eval/ Re-eval Auth #/ Referral # Total visits Start date  Expiration date Total active units  Total manual units  PT only or  PT+OT?     30                                                             Precautions: Standard      Visit 9 10 11 12 13    22   PA mobs KW Lateral OP lumbar, belt mob  KW    Flex Rot mob   KW, R             Neuro Re-Ed        Posture Edu        REIL 3x10 With hips off center   2x10   Piriformis str        L SGIS  At wall 2x5 Testing 3x10 3x15   Prone B knee flexion         MIKAEL        Flexion rotation in L SL   5x10-15, sustained 1x  Rev   Ther Ex        Seated OP press         LE therex at gym/equip        Functional act Rev       Rec bike Rev       Squat Free sq 15' with free weights       Bridging     10x                                                                   Modalities

## 2022-07-21 ENCOUNTER — OFFICE VISIT (OUTPATIENT)
Dept: PHYSICAL THERAPY | Facility: CLINIC | Age: 62
End: 2022-07-21
Payer: COMMERCIAL

## 2022-07-21 DIAGNOSIS — M48.061 SPINAL STENOSIS OF LUMBAR REGION WITHOUT NEUROGENIC CLAUDICATION: Primary | ICD-10-CM

## 2022-07-21 DIAGNOSIS — G57.02 PIRIFORMIS SYNDROME OF LEFT SIDE: ICD-10-CM

## 2022-07-21 PROCEDURE — 97112 NEUROMUSCULAR REEDUCATION: CPT | Performed by: PHYSICAL THERAPIST

## 2022-07-21 NOTE — LETTER
2022    Cabrera Garsia DO   [facility]  [address]    Patient: Janis Vergara   YOB: 1960   Date of Visit: 2022     Encounter Diagnosis     ICD-10-CM    1  Spinal stenosis of lumbar region without neurogenic claudication  M48 061    2  Piriformis syndrome of left side  G57 02        Dear Dr Fito Mary: Thank you for your recent referral of Janis Vergara  Please review the attached evaluation summary from Pavan's recent visit  Please verify that you agree with the plan of care by signing the attached order  If you have any questions or concerns, please do not hesitate to call  I sincerely appreciate the opportunity to share in the care of one of your patients and hope to have another opportunity to work with you in the near future  Sincerely,    Celia Cook PT      Referring Provider:      I certify that I have read the below Plan of Care and certify the need for these services furnished under this plan of treatment while under my care  Cabrera Garsia DO   [place] [address]  Via Fax: [phone]          Daily Note - Progress Note    Today's date: 2022  Patient name: Janis Vergara  : 1960  MRN: [id number]  Referring provider: Vickey Melchor DO  Dx:   Encounter Diagnosis     ICD-10-CM    1  Spinal stenosis of lumbar region without neurogenic claudication  M48 061    2  Piriformis syndrome of left side  G57 02                   Subjective: Pt presents with 4/10 L calf pain, tingling in toes and tightness in L buttock (slight)  He       Objective: See treatment diary below, STG/LTG ongoing  Lumbar AROM WFL painfree with min limitations with L SGIS  Pain continues to reach 6/10 at worst         Assessment: Tolerated treatment well   Patient demo ability to reduce distale symptoms with repeated motions and walking 200' will increase symptoms in calf however not to baseline return  Pt demo possibility of inflammatory component at this time and ongoing sensitized neural structures which contirbute to distal symptoms hoewever pt is continuing to progress and demo mechanical improvement from onset and prophylaxis despite symptoms aggravation  Plan: Continue per plan of care  Eval/ Re-eval Auth #/ Referral # Total visits Start date  Expiration date Total active units  Total manual units  PT only or  PT+OT?   4/26 30 4/26                                                            Precautions: Standard      Visit 10 11 12 13 14    6/16 6/23 6/28 7/5/22 7/21   PA mobs Lateral OP lumbar, belt mob  KW  KW   Flex Rot mob  KW, [place]   NM           Neuro Re-Ed        Posture Edu        REIL With hips off center   2x10 2x10, x10 with clin OP   Piriformis str        L SGIS At wall 2x5 Testing 3x10 3x15 10x, sustained 2x   Prone B knee flexion         MIKAEL        Flexion rotation in L SL  5x10-15, sustained 1x  Rev    Ther Ex        Seated OP press         LE therex at gym/equip        Functional act        Rec bike        Squat        Bridging    10x                                                                    Modalities

## 2022-07-21 NOTE — PROGRESS NOTES
Daily Note - Progress Note    Today's date: 2022  Patient name: Yenny Alegre  : 1960  MRN: [id number]  Referring provider: Kimberlyn Humphreys DO  Dx:   Encounter Diagnosis     ICD-10-CM    1  Spinal stenosis of lumbar region without neurogenic claudication  M48 061    2  Piriformis syndrome of left side  G57 02                   Subjective: Pt presents with 4/10 L calf pain, tingling in toes and tightness in L buttock (slight)  He       Objective: See treatment diary below, STG/LTG ongoing  Lumbar AROM WFL painfree with min limitations with L SGIS  Pain continues to reach 6/10 at worst         Assessment: Tolerated treatment well  Patient demo ability to reduce distale symptoms with repeated motions and walking 200' will increase symptoms in calf however not to baseline return  Pt demo possibility of inflammatory component at this time and ongoing sensitized neural structures which contirbute to distal symptoms hoewever pt is continuing to progress and demo mechanical improvement from onset and prophylaxis despite symptoms aggravation  Plan: Continue per plan of care  Eval/ Re-eval Auth #/ Referral # Total visits Start date  Expiration date Total active units  Total manual units  PT only or  PT+OT?     30                                                             Precautions: Standard      Visit 10 11 12 13 14    22   PA mobs Lateral OP lumbar, belt mob  KW  KW   Flex Rot mob  KW, [place]   PA           Neuro Re-Ed        Posture Edu        REIL With hips off center   2x10 2x10, x10 with clin OP   Piriformis str        L SGIS At wall 2x5 Testing 3x10 3x15 10x, sustained 2x   Prone B knee flexion         MIKAEL        Flexion rotation in L SL  5x10-15, sustained 1x  Rev    Ther Ex        Seated OP press         LE therex at gym/equip        Functional act        Rec bike        Squat        Bridging    10x Modalities

## 2022-07-26 ENCOUNTER — OFFICE VISIT (OUTPATIENT)
Dept: PHYSICAL THERAPY | Facility: CLINIC | Age: 62
End: 2022-07-26
Payer: COMMERCIAL

## 2022-07-26 DIAGNOSIS — M48.061 SPINAL STENOSIS OF LUMBAR REGION WITHOUT NEUROGENIC CLAUDICATION: Primary | ICD-10-CM

## 2022-07-26 PROCEDURE — 97112 NEUROMUSCULAR REEDUCATION: CPT | Performed by: PHYSICAL THERAPIST

## 2022-07-26 NOTE — PROGRESS NOTES
Daily Note     Today's date: 2022  Patient name: Natacha Reed  : 1960  MRN: [id number]  Referring provider: Mee Ahumada, DO  Dx:   Encounter Diagnosis     ICD-10-CM    1  Spinal stenosis of lumbar region without neurogenic claudication  M48 061                   Subjective: Pt reports that he is noticing L calf tingling 1/10 and overall is feeling very good, feeling he is near normal but has yet to return to full yardwork related tasks  Objective: See treatment diary below  REIL remains reductive force  Progressed core related tasks today including exercises that offer curve reversal into flexion  Assessment: Tolerated treatment well  Patient does demo ongoing sensitivity with flexion based tasks including 90 deg hip flex and partial squat with core engagement  Pt cont to demo risk of exacerbation however is able to reduce well with good carryover with extension based HEP         Plan: Continue per plan of care  F/u in 1 week     Eval/ Re-eval Auth #/ Referral # Total visits Start date  Expiration date Total active units  Total manual units  PT only or  PT+OT?                                                               Precautions: Standard      Visit 11 12 13 14 15    22   PA mobs  KW  KW    [employer] mob Manuelito MAYORGA            Neuro Re-Ed        Posture Edu        REIL   2x10 2x10, x10 with clin OP 4x10   Piriformis str        L SGIS Testing 3x10 3x15 10x, sustained 2x    Prone B knee flexion         MIKAEL        Flexion rotation in L SL 5x10-15, sustained 1x  Rev     Ther Ex        Seated OP press         LE therex at gym/equip        Functional act        Rec bike        Squat     To chair 10x   Bridging   10x  2x10          10x                                                           Modalities

## 2022-08-04 ENCOUNTER — APPOINTMENT (OUTPATIENT)
Dept: PHYSICAL THERAPY | Facility: CLINIC | Age: 62
End: 2022-08-04
Payer: COMMERCIAL

## 2022-08-18 ENCOUNTER — OFFICE VISIT (OUTPATIENT)
Dept: PHYSICAL THERAPY | Facility: CLINIC | Age: 62
End: 2022-08-18
Payer: COMMERCIAL

## 2022-08-18 DIAGNOSIS — M48.061 SPINAL STENOSIS OF LUMBAR REGION WITHOUT NEUROGENIC CLAUDICATION: Primary | ICD-10-CM

## 2022-08-18 DIAGNOSIS — G57.02 PIRIFORMIS SYNDROME OF LEFT SIDE: ICD-10-CM

## 2022-08-18 PROCEDURE — 97110 THERAPEUTIC EXERCISES: CPT | Performed by: PHYSICAL THERAPIST

## 2022-08-18 PROCEDURE — 97112 NEUROMUSCULAR REEDUCATION: CPT | Performed by: PHYSICAL THERAPIST

## 2022-08-18 NOTE — PROGRESS NOTES
Daily Note - Discharge Summary    Today's date: 2022  Patient name: Breana Blackwood  : 1960  MRN: [id number]  Referring provider: Bishnu Coon DO  Dx:   Encounter Diagnosis     ICD-10-CM    1  Spinal stenosis of lumbar region without neurogenic claudication  M48 061    2  Piriformis syndrome of left side  G57 02                   Subjective: Pt reports he has had 5 days symptom free and only notices 1/10 tingling in L calf today but this he notices is found after a day of removing a tree from his yard and also a prolonged bike ride  Otherwise pt feels he is confident in self management  He is continuing to self manage 5-6x/day  He is stretching regularly and finds that limiting factors do include slight awareness with bridging with LE extension in air, also with prolonged sitting on couch with ice  He notices hamstring curl at gym and stair master have left him with residual symptoms as well but he is not doing them anymore  HE does feel he can self manage at this time though  Objective: See treatment diary below  Lumbar AROM WNL pain free  LTGs accomplished  Rev functional mechanics with squats and dead lifts, modifications for gym activities and yard work to prevent flare up and postural management  Pro[phylaxis reviewed  Assessment: Tolerated treatment well  Patient has had long process of resolving symptoms and reaching full indpeendence for low back pain hwoever with several exacerbations fdurnig this episode he is now resolved and I with self management able to reduce and prrevent symptoms  At this time pt has accomplished est Goals and demo good prognosis for ongoing self management  Plan: DC to I HEP     Eval/ Re-eval Auth #/ Referral # Total visits Start date  Expiration date Total active units  Total manual units  PT only or  PT+OT?     30                                                             Precautions: Standard      Visit 11 12 13 14 15 16     6/28 7/5/22 7/21 7/26 8/16   PA mobs  KW  KW  KW   Flex Rot mob Manuelito MAYORGA   X              Neuro Re-Ed         Posture Edu         REIL   2x10 2x10, x10 with clin OP 4x10 2x10   Piriformis str      Rev   L SGIS Testing 3x10 3x15 10x, sustained 2x  Rev   Prone B knee flexion          MIKAEL         Flexion rotation in L SL 5x10-15, sustained 1x  Rev      Ther Ex         Seated OP press          LE therex at gym/equip      Rev   Functional act      Rev, RDLs and Squats   Rec bike         Squat     To chair 10x See above   Bridging   10x  2x10  With lower ab progress 15x ea   90/90 march      10x 15x                                                                  Modalities

## 2022-12-07 ENCOUNTER — OFFICE VISIT (OUTPATIENT)
Dept: INTERNAL MEDICINE CLINIC | Facility: CLINIC | Age: 62
End: 2022-12-07

## 2022-12-07 VITALS
WEIGHT: 176 LBS | DIASTOLIC BLOOD PRESSURE: 78 MMHG | HEIGHT: 66 IN | SYSTOLIC BLOOD PRESSURE: 118 MMHG | HEART RATE: 49 BPM | OXYGEN SATURATION: 99 % | BODY MASS INDEX: 28.28 KG/M2

## 2022-12-07 DIAGNOSIS — Z00.00 ANNUAL PHYSICAL EXAM: Primary | ICD-10-CM

## 2022-12-07 DIAGNOSIS — E78.5 HYPERLIPIDEMIA, UNSPECIFIED HYPERLIPIDEMIA TYPE: ICD-10-CM

## 2022-12-07 DIAGNOSIS — Z12.5 SCREENING PSA (PROSTATE SPECIFIC ANTIGEN): ICD-10-CM

## 2022-12-07 NOTE — PATIENT INSTRUCTIONS

## 2022-12-07 NOTE — PROGRESS NOTES
[facility]    NAME: Barbi Piña  AGE: 57 y o  SEX: male  : 1960     DATE: 2022     Assessment and Plan:     Problem List Items Addressed This Visit    None  Visit Diagnoses     Annual physical exam    -  Primary    Hyperlipidemia, unspecified hyperlipidemia type        Relevant Orders    CT coronary calcium score    CBC and differential    Comprehensive metabolic panel    Lipid Panel with Direct LDL reflex    Screening PSA (prostate specific antigen)        Relevant Orders    PSA, Total Screen          Immunizations and preventive care screenings were discussed with patient today  Appropriate education was printed on patient's after visit summary  Discussed risks and benefits of prostate cancer screening  We discussed the controversial history of PSA screening for prostate cancer in the [place] as well as the risk of over detection and over treatment of prostate cancer by way of PSA screening  The patient understands that PSA blood testing is an imperfect way to screen for prostate cancer and that elevated PSA levels in the blood may also be caused by infection, inflammation, prostatic trauma or manipulation, urological procedures, or by benign prostatic enlargement  The role of the digital rectal examination in prostate cancer screening was also discussed and I discussed with him that there is large interobserver variability in the findings of digital rectal examination  Counseling:  · Exercise: the importance of regular exercise/physical activity was discussed  Recommend exercise 3-5 times per week for at least 30 minutes  BMI Counseling: Body mass index is 28 41 kg/m²  The BMI is above normal  Nutrition recommendations include reducing intake of saturated and trans fat  Rationale for BMI follow-up plan is due to patient being overweight or obese       Depression Screening and Follow-up Plan: Patient was screened for depression during today's encounter  They screened negative with a PHQ-2 score of 0  Use Debrox to right ear  May try OTC treatment for right planta wart    Return in about 1 year (around 12/7/2023)  Chief Complaint:     Chief Complaint   Patient presents with   • Annual Exam   • Hip Pain     Left sided hip pain radiating to calf   • Plantar Warts     Right foot      History of Present Illness:     Adult Annual Physical   Patient here for a comprehensive physical exam  The patient reports problems - left hip pain left calf pain tingling in the left foot right planta wart  Diet and Physical Activity  · Diet/Nutrition: well balanced diet and consuming 3-5 servings of fruits/vegetables daily  · Exercise: 5-7 times a week on average  Depression Screening  PHQ-2/9 Depression Screening    Little interest or pleasure in doing things: 0 - not at all  Feeling down, depressed, or hopeless: 0 - not at all  PHQ-2 Score: 0  PHQ-2 Interpretation: Negative depression screen       General Health  · Sleep: sleeps well  · Hearing: normal - bilateral   · Vision: most recent eye exam >1 year ago and wears reading glasses  · Dental: regular dental visits  [facility]  · Symptoms include: none     Review of Systems:     Review of Systems   Constitutional: Negative for chills and fever  HENT: Negative for rhinorrhea  Respiratory: Negative for cough and shortness of breath  Cardiovascular: Negative for chest pain and palpitations  Gastrointestinal: Negative for abdominal pain, constipation and diarrhea  Genitourinary: Negative for difficulty urinating  Musculoskeletal: Positive for arthralgias (left hip and left calf)  Neurological: Negative for dizziness and headaches        Past Medical History:     Past Medical History:   Diagnosis Date   • Elevated blood pressure reading     Last assessed: 3/24/15   • Exposure to SARS-associated coronavirus 11/12/2020      Past Surgical History:     Past Surgical History:   Procedure Laterality Date   • KNEE ARTHROSCOPY Left    • UMBILICAL HERNIA REPAIR  1985      Family History:     Family History   Problem Relation Age of Onset   • Leukemia Mother    • Diabetes Father         Mellitus   • Hypertension Father       Social History:     Social History     Socioeconomic History   • Marital status: /Civil Union     Spouse name: None   • Number of children: None   • Years of education: None   • Highest education level: None   Occupational History   • Occupation: Retired   Tobacco Use   • Smoking status: Never   • Smokeless tobacco: Never   Vaping Use   • Vaping Use: Never used   Substance and Sexual Activity   • Alcohol use: Yes     Alcohol/week: 5 0 standard drinks     Types: 3 Cans of beer, 2 Glasses of wine per week     Comment: Social   • Drug use: Never   • Sexual activity: Yes   Other Topics Concern   • None   Social History Narrative    Exercising regularly     Social Determinants of Health     Financial Resource Strain: Not on file   Food Insecurity: Not on file   Transportation Needs: Not on file   Physical Activity: Not on file   Stress: Not on file   Social Connections: Not on file   Intimate Partner Violence: Not on file   Housing Stability: Not on file      Current Medications:     Current Outpatient Medications   Medication Sig Dispense Refill   • Naproxen Sodium (ALEVE PO) Take 200 mg by mouth 2 (two) times a day     • PreviDent 5000 Booster Plus 1 1 % PSTE BRUSH NORMALLY AND EXPECTORATE BUT DO NOT RINSE AFTER  DO NOT EAT OR DRINK ANYTHING FOR AT LEAST 30 MINUTES AFTER USE       • gabapentin (Neurontin) 100 mg capsule Take 1 capsule (100 mg total) by mouth 3 (three) times a day (Patient not taking: Reported on 12/7/2022) 90 capsule 1   • methocarbamol (ROBAXIN) 500 mg tablet Take 1 tablet (500 mg total) by mouth 2 (two) times a day as needed for muscle spasms (Patient not taking: Reported on 12/7/2022) 30 tablet 0     No current facility-administered medications for this visit  Allergies: Allergies   Allergen Reactions   • Oxycodone-Acetaminophen Nausea Only      Physical Exam:     /78 (BP Location: Left arm)   Pulse (!) 49   Ht 5' 6" (1 676 m)   Wt 79 8 kg (176 lb)   SpO2 99%   BMI 28 41 kg/m²     Physical Exam  Vitals and nursing note reviewed  Constitutional:       General: He is not in acute distress  Appearance: He is well-developed  He is not ill-appearing, toxic-appearing or diaphoretic  HENT:      Head: Normocephalic and atraumatic  Right Ear: External ear normal  There is impacted cerumen  Left Ear: External ear normal  There is no impacted cerumen  Eyes:      Conjunctiva/sclera: Conjunctivae normal    Cardiovascular:      Rate and Rhythm: Normal rate and regular rhythm  Heart sounds: No murmur heard  Pulmonary:      Effort: Pulmonary effort is normal  No respiratory distress  Breath sounds: Normal breath sounds  Abdominal:      Palpations: Abdomen is soft  Tenderness: There is no abdominal tenderness  Musculoskeletal:         General: No swelling  Cervical back: Neck supple  Lumbar back: Positive left straight leg raise test  Negative right straight leg raise test       Left hip: Normal range of motion  Right lower leg: No edema  Left lower leg: No edema  Comments: Negative FABERs on the left   Skin:     Findings: Lesion (plantar wart right) present  Neurological:      Mental Status: He is alert     Psychiatric:         Mood and Affect: Mood normal          Behavior: Behavior normal           Lu Blanc MD  [facility]

## 2022-12-13 ENCOUNTER — APPOINTMENT (OUTPATIENT)
Dept: LAB | Facility: CLINIC | Age: 62
End: 2022-12-13

## 2022-12-13 DIAGNOSIS — E78.5 HYPERLIPIDEMIA, UNSPECIFIED HYPERLIPIDEMIA TYPE: ICD-10-CM

## 2022-12-13 DIAGNOSIS — Z12.5 SCREENING PSA (PROSTATE SPECIFIC ANTIGEN): ICD-10-CM

## 2022-12-13 LAB
ALBUMIN SERPL BCP-MCNC: 4.1 G/DL (ref 3.5–5)
ALP SERPL-CCNC: 53 U/L (ref 34–104)
ALT SERPL W P-5'-P-CCNC: 12 U/L (ref 7–52)
ANION GAP SERPL CALCULATED.3IONS-SCNC: 3 MMOL/L (ref 4–13)
AST SERPL W P-5'-P-CCNC: 22 U/L (ref 13–39)
BASOPHILS # BLD AUTO: 0.05 THOUSANDS/ÂΜL (ref 0–0.1)
BASOPHILS NFR BLD AUTO: 1 % (ref 0–1)
BILIRUB SERPL-MCNC: 0.64 MG/DL (ref 0.2–1)
BUN SERPL-MCNC: 15 MG/DL (ref 5–25)
CALCIUM SERPL-MCNC: 9.6 MG/DL (ref 8.4–10.2)
CHLORIDE SERPL-SCNC: 105 MMOL/L (ref 96–108)
CHOLEST SERPL-MCNC: 172 MG/DL
CO2 SERPL-SCNC: 31 MMOL/L (ref 21–32)
CREAT SERPL-MCNC: 0.93 MG/DL (ref 0.6–1.3)
EOSINOPHIL # BLD AUTO: 0.18 THOUSAND/ÂΜL (ref 0–0.61)
EOSINOPHIL NFR BLD AUTO: 3 % (ref 0–6)
ERYTHROCYTE [DISTWIDTH] IN BLOOD BY AUTOMATED COUNT: 12.7 % (ref 11.6–15.1)
GFR SERPL CREATININE-BSD FRML MDRD: 86 ML/MIN/1.73SQ M
GLUCOSE P FAST SERPL-MCNC: 94 MG/DL (ref 65–99)
HCT VFR BLD AUTO: 46.3 % (ref 36.5–49.3)
HDLC SERPL-MCNC: 58 MG/DL
HGB BLD-MCNC: 15.2 G/DL (ref 12–17)
IMM GRANULOCYTES # BLD AUTO: 0.01 THOUSAND/UL (ref 0–0.2)
IMM GRANULOCYTES NFR BLD AUTO: 0 % (ref 0–2)
LDLC SERPL CALC-MCNC: 104 MG/DL (ref 0–100)
LYMPHOCYTES # BLD AUTO: 1.17 THOUSANDS/ÂΜL (ref 0.6–4.47)
LYMPHOCYTES NFR BLD AUTO: 22 % (ref 14–44)
MCH RBC QN AUTO: 29.9 PG (ref 26.8–34.3)
MCHC RBC AUTO-ENTMCNC: 32.8 G/DL (ref 31.4–37.4)
MCV RBC AUTO: 91 FL (ref 82–98)
MONOCYTES # BLD AUTO: 0.81 THOUSAND/ÂΜL (ref 0.17–1.22)
MONOCYTES NFR BLD AUTO: 15 % (ref 4–12)
NEUTROPHILS # BLD AUTO: 3.09 THOUSANDS/ÂΜL (ref 1.85–7.62)
NEUTS SEG NFR BLD AUTO: 59 % (ref 43–75)
NRBC BLD AUTO-RTO: 0 /100 WBCS
PLATELET # BLD AUTO: 211 THOUSANDS/UL (ref 149–390)
PMV BLD AUTO: 11.4 FL (ref 8.9–12.7)
POTASSIUM SERPL-SCNC: 4.4 MMOL/L (ref 3.5–5.3)
PROT SERPL-MCNC: 6.8 G/DL (ref 6.4–8.4)
PSA SERPL-MCNC: 1 NG/ML (ref 0–4)
RBC # BLD AUTO: 5.08 MILLION/UL (ref 3.88–5.62)
SODIUM SERPL-SCNC: 139 MMOL/L (ref 135–147)
TRIGL SERPL-MCNC: 51 MG/DL
WBC # BLD AUTO: 5.31 THOUSAND/UL (ref 4.31–10.16)

## 2022-12-14 ENCOUNTER — TELEPHONE (OUTPATIENT)
Dept: OTHER | Facility: OTHER | Age: 62
End: 2022-12-14

## 2022-12-14 NOTE — TELEPHONE ENCOUNTER
Call from patient requesting update on the prior authorization that he needs on his CT Scan  Please return patient call

## 2023-01-20 ENCOUNTER — TELEPHONE (OUTPATIENT)
Dept: OTHER | Facility: OTHER | Age: 63
End: 2023-01-20

## 2023-01-20 ENCOUNTER — NURSE TRIAGE (OUTPATIENT)
Dept: OTHER | Facility: OTHER | Age: 63
End: 2023-01-20

## 2023-01-20 DIAGNOSIS — U07.1 COVID-19: Primary | ICD-10-CM

## 2023-01-20 RX ORDER — NIRMATRELVIR AND RITONAVIR 300-100 MG
3 KIT ORAL 2 TIMES DAILY
Qty: 30 TABLET | Refills: 0 | Status: SHIPPED | OUTPATIENT
Start: 2023-01-20 | End: 2023-01-25

## 2023-01-20 NOTE — TELEPHONE ENCOUNTER
I sent over Paxlovid for him   Take three tabs twice a day for 5 days  I will send him a [employer] message with the drug information  Confirm what day he started with symptoms since it is best to take within the first 5 days r radial cath site

## 2023-01-20 NOTE — TELEPHONE ENCOUNTER
Called pt he stated his   Symptoms started yesterday, headache, body aches, runny nose, congestion, cough  Pt denies all other symptoms   Appt scheduled with Dr Robyn Kelly on Monday for follow up  [facility]

## 2023-01-20 NOTE — TELEPHONE ENCOUNTER
Patient called in to report he is positive for Covid this morning 1/20/23  Reports mild symptoms  Patient is interested in antiviral medication  Please follow up to schedule patient for VV  Reason for Disposition  • [1] COVID-19 diagnosed by positive lab test (e g , PCR, rapid self-test kit) AND [2] mild symptoms (e g , cough, fever, others) AND [2] no complications or SOB    Answer Assessment - Initial Assessment Questions  Were you within 6 feet or less, for up to 15 minutes or more with a person that has a confirmed COVID-19 test?Unknown  What was the date of your exposure?  Home test positive 1/20/23  Are you experiencing any symptoms attributed to the virus?  (Assess for SOB, cough, fever, difficulty breathing) Headache, body aches, chills, runny nose, cough, fatigue,   HIGH RISK: Do you have any history heart or lung conditions, weakened immune system, diabetes, Asthma, CHF, HIV, COPD, Chemo, renal failure, sickle cell, etc? Denies  PREGNANCY: Are you pregnant or did you recently give birth? n/a  VACCINE: "Have you gotten the COVID-19 vaccine?" If Yes ask: "Which one, how many shots, when did you get it?" Pfizer x 2 shots plus three boosters    Protocols used: CORONAVIRUS (COVID-19) DIAGNOSED OR SUSPECTED-ADULT-[place]

## 2023-01-20 NOTE — TELEPHONE ENCOUNTER
Regarding: IVON Covid +  ----- Message from Ny Leal RN sent at 1/20/2023  9:14 AM EST -----  Covid + today-headache, body chills   Patient wants antiviral

## 2023-01-20 NOTE — TELEPHONE ENCOUNTER
Pt is playing phone tag with office  Tested positive for covid and fernandas darby  Spoke with Jose Alberto Coats at the office, they will call him before office closes today  Pt aware

## 2023-09-18 ENCOUNTER — TELEPHONE (OUTPATIENT)
Age: 63
End: 2023-09-18

## 2023-09-18 DIAGNOSIS — T75.3XXA MOTION SICKNESS, INITIAL ENCOUNTER: ICD-10-CM

## 2023-09-18 DIAGNOSIS — U07.1 COVID-19: Primary | ICD-10-CM

## 2023-09-18 RX ORDER — SCOLOPAMINE TRANSDERMAL SYSTEM 1 MG/1
1 PATCH, EXTENDED RELEASE TRANSDERMAL
Qty: 4 PATCH | Refills: 0 | Status: SHIPPED | OUTPATIENT
Start: 2023-09-18 | End: 2023-12-08

## 2023-09-18 RX ORDER — NIRMATRELVIR AND RITONAVIR 300-100 MG
3 KIT ORAL 2 TIMES DAILY
Qty: 30 TABLET | Refills: 0 | Status: SHIPPED | OUTPATIENT
Start: 2023-09-18 | End: 2023-09-23

## 2023-09-18 NOTE — TELEPHONE ENCOUNTER
Pt is looking for a script for Paxlovid so he can take it with him to [place] while on vacation in case he gets sick. Also pt is requesting a patch (scopalamine) for a sailing trip. Pt uses [facility] on [address]. Please, let pt know.

## 2023-12-08 ENCOUNTER — OFFICE VISIT (OUTPATIENT)
Dept: INTERNAL MEDICINE CLINIC | Facility: CLINIC | Age: 63
End: 2023-12-08
Payer: COMMERCIAL

## 2023-12-08 VITALS
HEART RATE: 77 BPM | WEIGHT: 173 LBS | HEIGHT: 66 IN | BODY MASS INDEX: 27.8 KG/M2 | OXYGEN SATURATION: 98 % | DIASTOLIC BLOOD PRESSURE: 83 MMHG | SYSTOLIC BLOOD PRESSURE: 134 MMHG

## 2023-12-08 DIAGNOSIS — E78.5 HYPERLIPIDEMIA, UNSPECIFIED HYPERLIPIDEMIA TYPE: ICD-10-CM

## 2023-12-08 DIAGNOSIS — Z12.5 SCREENING PSA (PROSTATE SPECIFIC ANTIGEN): ICD-10-CM

## 2023-12-08 DIAGNOSIS — L72.0 EPIDERMOID CYST OF SKIN OF BACK: ICD-10-CM

## 2023-12-08 DIAGNOSIS — Z13.6 SCREENING FOR CARDIOVASCULAR CONDITION: ICD-10-CM

## 2023-12-08 DIAGNOSIS — D17.1 LIPOMA OF TORSO: ICD-10-CM

## 2023-12-08 DIAGNOSIS — Z00.00 WELCOME TO MEDICARE PREVENTIVE VISIT: Primary | ICD-10-CM

## 2023-12-08 PROCEDURE — G0402 INITIAL PREVENTIVE EXAM: HCPCS | Performed by: INTERNAL MEDICINE

## 2023-12-08 PROCEDURE — G0403 EKG FOR INITIAL PREVENT EXAM: HCPCS | Performed by: INTERNAL MEDICINE

## 2023-12-08 NOTE — PATIENT INSTRUCTIONS
Medicare Preventive Visit Patient Instructions  Thank you for completing your Welcome to Medicare Visit or Medicare Annual Wellness Visit today. Your next wellness visit will be due in one year (12/8/2024). The screening/preventive services that you may require over the next 5-10 years are detailed below. Some tests may not apply to you based off risk factors and/or age. Screening tests ordered at today's visit but not completed yet may show as past due. Also, please note that scanned in results may not display below. Preventive Screenings:  Service Recommendations Previous Testing/Comments   Colorectal Cancer Screening  Colonoscopy    Fecal Occult Blood Test (FOBT)/Fecal Immunochemical Test (FIT)  Fecal DNA/Cologuard Test  Flexible Sigmoidoscopy Age: 43-73 years old   Colonoscopy: every 10 years (May be performed more frequently if at higher risk)  OR  FOBT/FIT: every 1 year  OR  Cologuard: every 3 years  OR  Sigmoidoscopy: every 5 years  Screening may be recommended earlier than age 39 if at higher risk for colorectal cancer. Also, an individualized decision between you and your healthcare provider will decide whether screening between the ages of 77-80 would be appropriate.  Colonoscopy: 07/17/2015  FOBT/FIT: Not on file  Cologuard: Not on file  Sigmoidoscopy: Not on file          Prostate Cancer Screening Individualized decision between patient and health care provider in men between ages of 53-66   Medicare will cover every 12 months beginning on the day after your 50th birthday PSA: 1.0 ng/mL           Hepatitis C Screening Once for adults born between 1945 and 1965  More frequently in patients at high risk for Hepatitis C Hep C Antibody: 10/23/2019        Diabetes Screening 1-2 times per year if you're at risk for diabetes or have pre-diabetes Fasting glucose: 94 mg/dL (12/13/2022)  A1C: No results in last 5 years (No results in last 5 years)      Cholesterol Screening Once every 5 years if you don't have a lipid disorder. May order more often based on risk factors. Lipid panel: 12/13/2022         Other Preventive Screenings Covered by Medicare:  Abdominal Aortic Aneurysm (AAA) Screening: covered once if your at risk. You're considered to be at risk if you have a family history of AAA or a male between the age of 70-76 who smoking at least 100 cigarettes in your lifetime. Lung Cancer Screening: covers low dose CT scan once per year if you meet all of the following conditions: (1) Age 48-67; (2) No signs or symptoms of lung cancer; (3) Current smoker or have quit smoking within the last 15 years; (4) You have a tobacco smoking history of at least 20 pack years (packs per day x number of years you smoked); (5) You get a written order from a healthcare provider. Glaucoma Screening: covered annually if you're considered high risk: (1) You have diabetes OR (2) Family history of glaucoma OR (3)  aged 48 and older OR (3)  American aged 72 and older  Osteoporosis Screening: covered every 2 years if you meet one of the following conditions: (1) Have a vertebral abnormality; (2) On glucocorticoid therapy for more than 3 months; (3) Have primary hyperparathyroidism; (4) On osteoporosis medications and need to assess response to drug therapy. HIV Screening: covered annually if you're between the age of 14-79. Also covered annually if you are younger than 13 and older than 72 with risk factors for HIV infection. For pregnant patients, it is covered up to 3 times per pregnancy.     Immunizations:  Immunization Recommendations   Influenza Vaccine Annual influenza vaccination during flu season is recommended for all persons aged >= 6 months who do not have contraindications   Pneumococcal Vaccine   * Pneumococcal conjugate vaccine = PCV13 (Prevnar 13), PCV15 (Vaxneuvance), PCV20 (Prevnar 20)  * Pneumococcal polysaccharide vaccine = PPSV23 (Pneumovax) Adults 86-94 yo with certain risk factors or if 69+ yo  If never received any pneumonia vaccine: recommend Prevnar 21 (PCV20)  Give PCV20 if previously received 1 dose of PCV13 or PPSV23   Hepatitis B Vaccine 3 dose series if at intermediate or high risk (ex: diabetes, end stage renal disease, liver disease)   Respiratory syncytial virus (RSV) Vaccine - COVERED BY MEDICARE PART D  * RSVPreF3 (Arexvy) CDC recommends that adults 61years of age and older may receive a single dose of RSV vaccine using shared clinical decision-making (SCDM)   Tetanus (Td) Vaccine - COST NOT COVERED BY MEDICARE PART B Following completion of primary series, a booster dose should be given every 10 years to maintain immunity against tetanus. Td may also be given as tetanus wound prophylaxis. Tdap Vaccine - COST NOT COVERED BY MEDICARE PART B Recommended at least once for all adults. For pregnant patients, recommended with each pregnancy. Shingles Vaccine (Shingrix) - COST NOT COVERED BY MEDICARE PART B  2 shot series recommended in those 23 years and older who have or will have weakened immune systems or those 50 years and older     Health Maintenance Due:      Topic Date Due   • HIV Screening  Never done   • Colorectal Cancer Screening  07/17/2025   • Hepatitis C Screening  Completed     Immunizations Due:      Topic Date Due   • COVID-19 Vaccine (3 - Pfizer series) 06/18/2021   • Influenza Vaccine (1) 09/01/2023   • Pneumococcal Vaccine: 65+ Years (1 - PCV) Never done   • DTaP,Tdap,and Td Vaccines (2 - Td or Tdap) 09/12/2023     Advance Directives   What are advance directives? Advance directives are legal documents that state your wishes and plans for medical care. These plans are made ahead of time in case you lose your ability to make decisions for yourself. Advance directives can apply to any medical decision, such as the treatments you want, and if you want to donate organs. What are the types of advance directives?   There are many types of advance directives, and each state has rules about how to use them. You may choose a combination of any of the following:  Living will: This is a written record of the treatment you want. You can also choose which treatments you do not want, which to limit, and which to stop at a certain time. This includes surgery, medicine, IV fluid, and tube feedings. Durable power of  for [facility]): This is a written record that states who you want to make healthcare choices for you when you are unable to make them for yourself. This person, called a proxy, is usually a family member or a friend. You may choose more than 1 proxy. Do not resuscitate (DNR) order:  A DNR order is used in case your heart stops beating or you stop breathing. It is a request not to have certain forms of treatment, such as CPR. A DNR order may be included in other types of advance directives. Medical directive: This covers the care that you want if you are in a coma, near death, or unable to make decisions for yourself. You can list the treatments you want for each condition. Treatment may include pain medicine, surgery, blood transfusions, dialysis, IV or tube feedings, and a ventilator (breathing machine). Values history: This document has questions about your views, beliefs, and how you feel and think about life. This information can help others choose the care that you would choose. Why are advance directives important? An advance directive helps you control your care. Although spoken wishes may be used, it is better to have your wishes written down. Spoken wishes can be misunderstood, or not followed. Treatments may be given even if you do not want them. An advance directive may make it easier for your family to make difficult choices about your care.    Weight Management   Why it is important to manage your weight:  Being overweight increases your risk of health conditions such as heart disease, high blood pressure, type 2 diabetes, and certain types of cancer. It can also increase your risk for osteoarthritis, sleep apnea, and other respiratory problems. Aim for a slow, steady weight loss. Even a small amount of weight loss can lower your risk of health problems. How to lose weight safely:  A safe and healthy way to lose weight is to eat fewer calories and get regular exercise. You can lose up about 1 pound a week by decreasing the number of calories you eat by 500 calories each day. Healthy meal plan for weight management:  A healthy meal plan includes a variety of foods, contains fewer calories, and helps you stay healthy. A healthy meal plan includes the following:  Eat whole-grain foods more often. A healthy meal plan should contain fiber. Fiber is the part of grains, fruits, and vegetables that is not broken down by your body. Whole-grain foods are healthy and provide extra fiber in your diet. Some examples of whole-grain foods are whole-wheat breads and pastas, oatmeal, brown rice, and bulgur. Eat a variety of vegetables every day. Include dark, leafy greens such as spinach, kale, murtaza greens, and mustard greens. Eat yellow and orange vegetables such as carrots, sweet potatoes, and winter squash. Eat a variety of fruits every day. Choose fresh or canned fruit (canned in its own juice or light syrup) instead of juice. Fruit juice has very little or no fiber. Eat low-fat dairy foods. Drink fat-free (skim) milk or 1% milk. Eat fat-free yogurt and low-fat cottage cheese. Try low-fat cheeses such as mozzarella and other reduced-fat cheeses. Choose meat and other protein foods that are low in fat. Choose beans or other legumes such as split peas or lentils. Choose fish, skinless poultry (chicken or turkey), or lean cuts of red meat (beef or pork). Before you cook meat or poultry, cut off any visible fat. Use less fat and oil. Try baking foods instead of frying them.  Add less fat, such as margarine, sour cream, regular salad dressing and mayonnaise to foods. Eat fewer high-fat foods. Some examples of high-fat foods include french fries, doughnuts, ice cream, and cakes. Eat fewer sweets. Limit foods and drinks that are high in sugar. This includes candy, cookies, regular soda, and sweetened drinks. Exercise:  Exercise at least 30 minutes per day on most days of the week. Some examples of exercise include walking, biking, dancing, and swimming. You can also fit in more physical activity by taking the stairs instead of the elevator or parking farther away from stores. Ask your healthcare provider about the best exercise plan for you. © Copyright [employer] 2018 Information is for End User's use only and may not be sold, redistributed or otherwise used for commercial purposes.  All illustrations and images included in CareNotes® are the copyrighted property of [employer] Inc[employer] or [address] [place] [address]

## 2023-12-08 NOTE — PROGRESS NOTES
Assessment and Plan:     Problem List Items Addressed This Visit    None  Visit Diagnoses     Welcome to Medicare preventive visit    -  Primary    Hyperlipidemia, unspecified hyperlipidemia type        Relevant Orders    CT coronary calcium score    CBC and differential    Comprehensive metabolic panel    Lipid panel    UA w Reflex to Microscopic w Reflex to Culture -Lab Collect    Screening PSA (prostate specific antigen)        Relevant Orders    PSA, Total Screen    Screening for cardiovascular condition        Relevant Orders    POCT ECG (Completed)    Lipoma of torso        left lower sternum    Epidermoid cyst of skin of back        right laetral mid back        Consider RSV vaccine  Declines Prevnar 20 today    BMI Counseling: Body mass index is 28.34 kg/m². The BMI is above normal. Nutrition recommendations include reducing intake of saturated and trans fat. Exercise recommendations include exercising 3-5 times per week. Rationale for BMI follow-up plan is due to patient being overweight or obese. Depression Screening and Follow-up Plan: Patient was screened for depression during today's encounter. They screened negative with a PHQ-2 score of 0. Preventive health issues were discussed with patient, and age appropriate screening tests were ordered as noted in patient's After Visit Summary. Personalized health advice and appropriate referrals for health education or preventive services given if needed, as noted in patient's After Visit Summary. History of Present Illness:     Patient presents for a Medicare Wellness Visit    HPI   Patient Care Team:  Jessica Griffith MD as PCP - General  Karen Hogan MD     Review of Systems:     Review of Systems   Respiratory:  Negative for shortness of breath. Cardiovascular:  Negative for chest pain and palpitations. Gastrointestinal:  Negative for abdominal pain, constipation and diarrhea. Genitourinary:  Negative for difficulty urinating. Musculoskeletal:  Positive for back pain (left low back radiating to the left calf; sometimes with numbness in the toes). Neurological:  Negative for dizziness and headaches. Problem List:     Patient Active Problem List   Diagnosis   • Piriformis syndrome of left side   • Chronic pain syndrome   • Lumbar radiculopathy      Past Medical and Surgical History:     Past Medical History:   Diagnosis Date   • Elevated blood pressure reading     Last assessed: 3/24/15   • Exposure to SARS-associated coronavirus 11/12/2020     Past Surgical History:   Procedure Laterality Date   • KNEE ARTHROSCOPY Left    • UMBILICAL HERNIA REPAIR  1985      Family History:     Family History   Problem Relation Age of Onset   • Leukemia Mother    • Diabetes Father         Mellitus   • Hypertension Father       Social History:     Social History     Socioeconomic History   • Marital status: /Civil Union     Spouse name: None   • Number of children: None   • Years of education: None   • Highest education level: None   Occupational History   • Occupation: Retired   Tobacco Use   • Smoking status: Never   • Smokeless tobacco: Never   Vaping Use   • Vaping Use: Never used   Substance and Sexual Activity   • Alcohol use: Yes     Alcohol/week: 5.0 standard drinks of alcohol     Types: 3 Cans of beer, 2 Glasses of wine per week     Comment: Social   • Drug use: Never   • Sexual activity: Yes   Other Topics Concern   • None   Social History Narrative    Exercising regularly     Social Determinants of Health     Financial Resource Strain: Low Risk  (12/8/2023)    Overall Financial Resource Strain (CARDIA)    • Difficulty of Paying Living Expenses: Not hard at all   Food Insecurity: Not on file   Transportation Needs: No Transportation Needs (12/8/2023)    PRAPARE - Transportation    • Lack of Transportation (Medical): No    • Lack of Transportation (Non-Medical):  No   Physical Activity: Not on file   Stress: Not on file   Social Connections: Not on file   Intimate Partner Violence: Not on file   Housing Stability: Not on file      Medications and Allergies:     Current Outpatient Medications   Medication Sig Dispense Refill   • Naproxen Sodium (ALEVE PO) Take 200 mg by mouth 2 (two) times a day       No current facility-administered medications for this visit. Allergies   Allergen Reactions   • Oxycodone-Acetaminophen Nausea Only      Immunizations:     Immunization History   Administered Date(s) Administered   • COVID-19 PFIZER VACCINE 0.3 ML IM 03/30/2021, 04/23/2021   • H1N1, All Formulations 01/08/2010   • Hep A / Hep B 09/12/2013   • INFLUENZA 10/26/2021   • IPV 09/12/2013   • Influenza Injectable, MDCK, Preservative Free, Quadrivalent, 0.5 mL 10/20/2019   • Influenza Quadrivalent Preservative Free 3 years and older IM 12/04/2017   • Influenza, recombinant, quadrivalent,injectable, preservative free 10/04/2018   • Influenza, seasonal, injectable 12/02/2013, 01/01/2014   • TD (adult) Preservative Free 01/01/2013   • Tdap 09/12/2013   • Zoster Vaccine Recombinant 06/10/2021, 09/22/2021      Health Maintenance:         Topic Date Due   • HIV Screening  Never done   • Colorectal Cancer Screening  07/17/2025   • Hepatitis C Screening  Completed         Topic Date Due   • COVID-19 Vaccine (3 - Pfizer series) 06/18/2021   • Influenza Vaccine (1) 09/01/2023   • Pneumococcal Vaccine: 65+ Years (1 - PCV) Never done      Medicare Screening Tests and Risk Assessments:     Antonietta Mariscalmer is here for his Welcome to Medicare visit. Health Risk Assessment:   Patient rates overall health as excellent. Patient feels that their physical health rating is slightly better. Patient is very satisfied with their life. Eyesight was rated as same. Hearing was rated as same. Patient feels that their emotional and mental health rating is same. Patients states they are never, rarely angry. Patient states they are never, rarely unusually tired/fatigued.  Pain experienced in the last 7 days has been some. Patient's pain rating has been 2/10. Patient states that he has experienced no weight loss or gain in last 6 months. Depression Screening:   PHQ-2 Score: 0      Fall Risk Screening: In the past year, patient has experienced: no history of falling in past year      Home Safety:  Patient does not have trouble with stairs inside or outside of their home. Patient has working smoke alarms and has no working carbon monoxide detector. Home safety hazards include: none. Nutrition:   Current diet is Regular. Medications:   Patient is currently taking over-the-counter supplements. OTC medications include: see medication list. Patient is able to manage medications. Activities of Daily Living (ADLs)/Instrumental Activities of Daily Living (IADLs):   Walk and transfer into and out of bed and chair?: Yes  Dress and groom yourself?: Yes    Bathe or shower yourself?: Yes    Feed yourself?  Yes  Do your laundry/housekeeping?: Yes  Manage your money, pay your bills and track your expenses?: Yes  Make your own meals?: Yes    Do your own shopping?: Yes    Previous Hospitalizations:   Any hospitalizations or ED visits within the last 12 months?: No      Advance Care Planning:   Living will: No    Durable POA for healthcare: No    Advanced directive: No    Advanced directive counseling given: Yes    ACP document given: Yes    Patient declined ACP directive: No      Cognitive Screening:   Provider or family/friend/caregiver concerned regarding cognition?: No    PREVENTIVE SCREENINGS      Cardiovascular Screening:    General: Screening Not Indicated and History Lipid Disorder      Diabetes Screening:     General: Screening Current    Due for: Blood Glucose      Colorectal Cancer Screening:     General: Screening Current      Prostate Cancer Screening:    General: Screening Current    Due for: PSA      Abdominal Aortic Aneurysm (AAA) Screening:    Risk factors include: age between 70-77 yo        General: Screening Not Indicated      Lung Cancer Screening:     General: Screening Not Indicated      Hepatitis C Screening:    General: Screening Current    Screening, Brief Intervention, and Referral to Treatment (SBIRT)    Screening  Typical number of drinks in a day: 0  Typical number of drinks in a week: 5  Interpretation: Low risk drinking behavior. Single Item Drug Screening:  How often have you used an illegal drug (including marijuana) or a prescription medication for non-medical reasons in the past year? never    Single Item Drug Screen Score: 0  Interpretation: Negative screen for possible drug use disorder    Vision Screening    Right eye Left eye Both eyes   Without correction 20/20 20/40 20/25   With correction           Physical Exam:     /83   Pulse 77   Ht 5' 5.51" (1.664 m)   Wt 78.5 kg (173 lb)   SpO2 98%   BMI 28.34 kg/m²     Physical Exam  Vitals and nursing note reviewed. Constitutional:       General: He is not in acute distress. Appearance: He is well-developed. He is not ill-appearing, toxic-appearing or diaphoretic. HENT:      Head: Normocephalic and atraumatic. Right Ear: There is impacted cerumen. Left Ear: There is no impacted cerumen. Eyes:      Conjunctiva/sclera: Conjunctivae normal.   Cardiovascular:      Rate and Rhythm: Normal rate and regular rhythm. Heart sounds: No murmur heard. Pulmonary:      Effort: Pulmonary effort is normal. No respiratory distress. Breath sounds: Normal breath sounds. Abdominal:      Palpations: Abdomen is soft. Tenderness: There is no abdominal tenderness. Musculoskeletal:      Cervical back: Neck supple. Right lower leg: No edema. Left lower leg: No edema. Skin:     Findings: Lesion (hard movable nontender mass in the right lateral mid back; soft movable nontender mass in the left lower sternum) present. Neurological:      Mental Status: He is alert. Psychiatric:         Mood and Affect: Mood normal.         Behavior: Behavior normal.          Tessa Bennett MD

## 2023-12-12 ENCOUNTER — OFFICE VISIT (OUTPATIENT)
Dept: INTERNAL MEDICINE CLINIC | Facility: CLINIC | Age: 63
End: 2023-12-12
Payer: COMMERCIAL

## 2023-12-12 VITALS
DIASTOLIC BLOOD PRESSURE: 82 MMHG | OXYGEN SATURATION: 99 % | TEMPERATURE: 99.4 F | WEIGHT: 174.6 LBS | BODY MASS INDEX: 28.6 KG/M2 | HEART RATE: 85 BPM | SYSTOLIC BLOOD PRESSURE: 145 MMHG

## 2023-12-12 DIAGNOSIS — R22.2 LOCALIZED SWELLING, MASS AND LUMP, TRUNK: Primary | ICD-10-CM

## 2023-12-12 PROCEDURE — 99214 OFFICE O/P EST MOD 30 MIN: CPT | Performed by: INTERNAL MEDICINE

## 2023-12-12 RX ORDER — SULFAMETHOXAZOLE AND TRIMETHOPRIM 800; 160 MG/1; MG/1
1 TABLET ORAL EVERY 12 HOURS SCHEDULED
Qty: 14 TABLET | Refills: 0 | Status: SHIPPED | OUTPATIENT
Start: 2023-12-12 | End: 2023-12-19

## 2023-12-12 NOTE — ASSESSMENT & PLAN NOTE
Will treat with Bactrim with the redness and warmth and fevers.   Will also get ultrasound due to the thickening of the skin around the firmness in the area

## 2023-12-12 NOTE — PATIENT INSTRUCTIONS
Problem List Items Addressed This Visit          Other    Localized swelling, mass and lump, trunk - Primary     Will treat with Bactrim with the redness and warmth and fevers.   Will also get ultrasound due to the thickening of the skin around the firmness in the area         Relevant Medications    sulfamethoxazole-trimethoprim (BACTRIM DS) 800-160 mg per tablet    Other Relevant Orders    [place] MSK limited    Ambulatory Referral to General Surgery

## 2023-12-12 NOTE — PROGRESS NOTES
Name: Tete Penaloza      : 1960      MRN: [id number]  Encounter Provider: Erick Shah MD  Encounter Date: 2023   Encounter department: [facility]     1. Localized swelling, mass and lump, trunk  Assessment & Plan: Will treat with Bactrim with the redness and warmth and fevers. Will also get ultrasound due to the thickening of the skin around the firmness in the area    Orders:  -     [address] [employer] limited; Future; Expected date: 2023  -     sulfamethoxazole-trimethoprim (BACTRIM DS) 800-160 mg per tablet; Take 1 tablet by mouth every 12 (twelve) hours for 7 days  -     Ambulatory Referral to General Surgery; Future           Subjective     Pt concerned about a lump on his chest for years, it has been a little red over the weekend      Review of Systems   Constitutional:  Positive for chills and fever. Skin:         lump       Past Medical History:   Diagnosis Date   • Elevated blood pressure reading     Last assessed: 3/24/15   • Exposure to SARS-associated coronavirus 2020     Past Surgical History:   Procedure Laterality Date   • KNEE ARTHROSCOPY Left    • UMBILICAL HERNIA REPAIR       Family History   Problem Relation Age of Onset   • Leukemia Mother    • Diabetes Father         Mellitus   • Hypertension Father      Social History     Socioeconomic History   • Marital status: /Civil Union     Spouse name: None   • Number of children: None   • Years of education: None   • Highest education level: None   Occupational History   • Occupation: Retired   Tobacco Use   • Smoking status: Never   • Smokeless tobacco: Never   Vaping Use   • Vaping Use: Never used   Substance and Sexual Activity   • Alcohol use:  Yes     Alcohol/week: 5.0 standard drinks of alcohol     Types: 3 Cans of beer, 2 Glasses of wine per week     Comment: Social   • Drug use: Never   • Sexual activity: Yes   Other Topics Concern   • None   Social History Narrative    Exercising regularly     Social Determinants of Health     Financial Resource Strain: Low Risk  (12/8/2023)    Overall Financial Resource Strain (CARDIA)    • Difficulty of Paying Living Expenses: Not hard at all   Food Insecurity: Not on file   Transportation Needs: No Transportation Needs (12/8/2023)    PRAPARE - Transportation    • Lack of Transportation (Medical): No    • Lack of Transportation (Non-Medical): No   Physical Activity: Not on file   Stress: Not on file   Social Connections: Not on file   Intimate Partner Violence: Not on file   Housing Stability: Not on file     Current Outpatient Medications on File Prior to Visit   Medication Sig   • Naproxen Sodium (ALEVE PO) Take 200 mg by mouth 2 (two) times a day (Patient not taking: Reported on 12/12/2023)     Allergies   Allergen Reactions   • Oxycodone-Acetaminophen Nausea Only     Immunization History   Administered Date(s) Administered   • COVID-19 PFIZER VACCINE 0.3 ML IM 03/30/2021, 04/23/2021   • H1N1, All Formulations 01/08/2010   • Hep A / Hep B 09/12/2013   • INFLUENZA 10/26/2021   • IPV 09/12/2013   • Influenza Injectable, MDCK, Preservative Free, Quadrivalent, 0.5 mL 10/20/2019   • Influenza Quadrivalent Preservative Free 3 years and older IM 12/04/2017   • Influenza, recombinant, quadrivalent,injectable, preservative free 10/04/2018   • Influenza, seasonal, injectable 12/02/2013, 01/01/2014   • TD (adult) Preservative Free 01/01/2013   • Tdap 09/12/2013   • Zoster Vaccine Recombinant 06/10/2021, 09/22/2021       Objective     /82   Pulse 85   Temp 99.4 °F (37.4 °C)   Wt 79.2 kg (174 lb 9.6 oz)   SpO2 99%   BMI 28.60 kg/m²     Physical Exam  Constitutional:       General: He is not in acute distress. Appearance: Normal appearance. Skin:     Comments: Firm red warm tender lump with some thickened skin in the area under medial left breast tissue   Neurological:      Mental Status: He is alert.        Madan Brice MD

## 2023-12-14 ENCOUNTER — NURSE TRIAGE (OUTPATIENT)
Age: 63
End: 2023-12-14

## 2023-12-14 NOTE — TELEPHONE ENCOUNTER
Regarding: infected cyst, symptoms worsening  ----- Message from Inder Hopson sent at 12/14/2023 11:00 AM EST -----  Pt called in, he saw Dr. Saira Chavarria on 12/12 for an infected cyst/lump on his chest. He does have an appt for an US & the Gen. Surgeons office next week. Today he noticed it's starting to look worse, it's more red and irritated. Tried calling cts and was unable to get through. Please advise.

## 2023-12-14 NOTE — TELEPHONE ENCOUNTER
The patient returned your call I was unable to reach you  he said you could call his wife at [phone]

## 2023-12-15 ENCOUNTER — OFFICE VISIT (OUTPATIENT)
Dept: INTERNAL MEDICINE CLINIC | Facility: CLINIC | Age: 63
End: 2023-12-15
Payer: COMMERCIAL

## 2023-12-15 VITALS
SYSTOLIC BLOOD PRESSURE: 125 MMHG | DIASTOLIC BLOOD PRESSURE: 72 MMHG | WEIGHT: 173.2 LBS | HEART RATE: 68 BPM | BODY MASS INDEX: 28.37 KG/M2 | OXYGEN SATURATION: 98 % | TEMPERATURE: 97.1 F

## 2023-12-15 DIAGNOSIS — R22.2 LOCALIZED SWELLING, MASS AND LUMP, TRUNK: Primary | ICD-10-CM

## 2023-12-15 PROCEDURE — 99214 OFFICE O/P EST MOD 30 MIN: CPT | Performed by: INTERNAL MEDICINE

## 2023-12-15 NOTE — ASSESSMENT & PLAN NOTE
Patient was started on Bactrim 12/12 for redness, warmth, and thickening of skin/lump  just under left breast tissue in center of chest wall, and referred to surgeon and [address] ordered. He is getting the ultrasound tomorrow, and seeing the surgeon next week. Area looks like he is getting some waxy crusting. No other rash located in that dermatome, no lymph nodes appreciated in the axilla continue Bactrim, and get to emergency room if worsening with increased redness or pain or swelling. Discussed the possibility of IV antibiotics if not improving. No drainage. Discussed importance further workup for soft tissue mass/tumor/cancer possibility with CT if ultrasound is unrevealing.

## 2023-12-15 NOTE — PATIENT INSTRUCTIONS
Problem List Items Addressed This Visit          Other    Localized swelling, mass and lump, trunk - Primary     Patient was started on Bactrim 12/12 for redness, warmth, and thickening of skin/lump  just under left breast tissue in center of chest wall, and referred to surgeon and [address] ordered. He is getting the ultrasound tomorrow, and seeing the surgeon next week. Area looks like he is getting some waxy crusting. No other rash located in that dermatome, no lymph nodes appreciated in the axilla continue Bactrim, and get to emergency room if worsening with increased redness or pain or swelling. Discussed the possibility of IV antibiotics if not improving. No drainage. Discussed importance further workup for soft tissue mass/tumor/cancer possibility with CT if ultrasound is unrevealing.

## 2023-12-15 NOTE — PROGRESS NOTES
Name: Melissa Haney      : 1960      MRN: [id number]  Encounter Provider: Ole Hyman MD  Encounter Date: 12/15/2023   [facility]     1. Localized swelling, mass and lump, trunk  Assessment & Plan:  Patient was started on Bactrim  for redness, warmth, and thickening of skin/lump  just under left breast tissue in center of chest wall, and referred to surgeon and [address] ordered. He is getting the ultrasound tomorrow, and seeing the surgeon next week. Area looks like he is getting some waxy crusting. No other rash located in that dermatome, no lymph nodes appreciated in the axilla continue Bactrim, and get to emergency room if worsening with increased redness or pain or swelling. Discussed the possibility of IV antibiotics if not improving. No drainage. Discussed importance further workup for soft tissue mass/tumor/cancer possibility with CT if ultrasound is unrevealing. Subjective     Pt here for recheck of skin irritation/lump chest wall. Patient reports he had a lump in this area for a couple of years. It has never been red, tender, or swollen in the past.      Review of Systems   Constitutional:  Negative for chills and fever. Gastrointestinal:  Positive for constipation. Skin:  Positive for wound.        Past Medical History:   Diagnosis Date   • Elevated blood pressure reading     Last assessed: 3/24/15   • Exposure to SARS-associated coronavirus 2020     Past Surgical History:   Procedure Laterality Date   • KNEE ARTHROSCOPY Left    • UMBILICAL HERNIA REPAIR       Family History   Problem Relation Age of Onset   • Leukemia Mother    • Diabetes Father         Mellitus   • Hypertension Father      Social History     Socioeconomic History   • Marital status: /Civil Union     Spouse name: None   • Number of children: None   • Years of education: None   • Highest education level: None Occupational History   • Occupation: Retired   Tobacco Use   • Smoking status: Never   • Smokeless tobacco: Never   Vaping Use   • Vaping status: Never Used   Substance and Sexual Activity   • Alcohol use: Yes     Alcohol/week: 5.0 standard drinks of alcohol     Types: 3 Cans of beer, 2 Glasses of wine per week     Comment: Social   • Drug use: Never   • Sexual activity: Yes   Other Topics Concern   • None   Social History Narrative    Exercising regularly     Social Determinants of Health     Financial Resource Strain: Low Risk  (12/8/2023)    Overall Financial Resource Strain (CARDIA)    • Difficulty of Paying Living Expenses: Not hard at all   Food Insecurity: Not on file   Transportation Needs: No Transportation Needs (12/8/2023)    PRAPARE - Transportation    • Lack of Transportation (Medical): No    • Lack of Transportation (Non-Medical):  No   Physical Activity: Not on file   Stress: Not on file   Social Connections: Not on file   Intimate Partner Violence: Not on file   Housing Stability: Not on file     Current Outpatient Medications on File Prior to Visit   Medication Sig   • sulfamethoxazole-trimethoprim (BACTRIM DS) 800-160 mg per tablet Take 1 tablet by mouth every 12 (twelve) hours for 7 days   • Naproxen Sodium (ALEVE PO) Take 200 mg by mouth 2 (two) times a day (Patient not taking: Reported on 12/12/2023)     Allergies   Allergen Reactions   • Oxycodone-Acetaminophen Nausea Only     Immunization History   Administered Date(s) Administered   • COVID-19 PFIZER VACCINE 0.3 ML IM 03/30/2021, 04/23/2021   • H1N1, All Formulations 01/08/2010   • Hep A / Hep B 09/12/2013   • INFLUENZA 10/26/2021   • IPV 09/12/2013   • Influenza Injectable, MDCK, Preservative Free, Quadrivalent, 0.5 mL 10/20/2019   • Influenza Quadrivalent Preservative Free 3 years and older IM 12/04/2017   • Influenza, recombinant, quadrivalent,injectable, preservative free 10/04/2018   • Influenza, seasonal, injectable 12/02/2013, 01/01/2014   • TD (adult) Preservative Free 01/01/2013   • Tdap 09/12/2013   • Zoster Vaccine Recombinant 06/10/2021, 09/22/2021       Objective     /72   Pulse 68   Temp (!) 97.1 °F (36.2 °C) (Tympanic)   Wt 78.6 kg (173 lb 3.2 oz)   SpO2 98%   BMI 28.37 kg/m²     Physical Exam  Constitutional:       General: He is not in acute distress. Appearance: Normal appearance. Skin:     Comments: Thickened area of skin with redness and warmth and waxy crust located center of chest just below left side of breast tissue, tender. No other rash in dermatome, no axillary adenopathy appreciated   Neurological:      Mental Status: He is alert.        Jair Osuna MD

## 2023-12-16 ENCOUNTER — HOSPITAL ENCOUNTER (OUTPATIENT)
Dept: ULTRASOUND IMAGING | Facility: HOSPITAL | Age: 63
Discharge: HOME | End: 2023-12-16
Payer: COMMERCIAL

## 2023-12-16 DIAGNOSIS — R22.2 LOCALIZED SWELLING, MASS AND LUMP, TRUNK: ICD-10-CM

## 2023-12-16 PROCEDURE — 76705 ECHO EXAM OF ABDOMEN: CPT

## 2023-12-18 ENCOUNTER — OFFICE VISIT (OUTPATIENT)
Dept: SURGERY | Facility: CLINIC | Age: 63
End: 2023-12-18
Payer: COMMERCIAL

## 2023-12-18 VITALS — WEIGHT: 170 LBS | BODY MASS INDEX: 27.32 KG/M2 | HEIGHT: 66 IN

## 2023-12-18 DIAGNOSIS — R22.2 LOCALIZED SWELLING, MASS AND LUMP, TRUNK: ICD-10-CM

## 2023-12-18 DIAGNOSIS — L72.3 INFECTED SEBACEOUS CYST: Primary | ICD-10-CM

## 2023-12-18 DIAGNOSIS — L08.9 INFECTED SEBACEOUS CYST: Primary | ICD-10-CM

## 2023-12-18 PROCEDURE — 99202 OFFICE O/P NEW SF 15 MIN: CPT | Performed by: SURGERY

## 2023-12-18 PROCEDURE — 10060 I&D ABSCESS SIMPLE/SINGLE: CPT | Performed by: SURGERY

## 2023-12-18 NOTE — PROGRESS NOTES
Assessment/Plan: Patient presents with an infected sebaceous cyst over the xiphoid region.  It is 7 x 7 cm in size.  Surrounding erythema is notable.    After consultation this area was incised and drained.  Significant sebum and purulence was expressed.  Wound packing was then placed.  He tolerated this well.  Instructions provided.    Diagnoses and all orders for this visit:    Localized swelling, mass and lump, trunk  -     Ambulatory Referral to General Surgery        Subjective:      Patient ID: Pavan Jimenez is a 63 y.o. male.    Patient presents for evaluation of a lump on his chest.  States he has had the lump for one year.  The lump became red and sore one week ago.  He has intermittent pain and the lump has increased in size.  He has been taking Bactrim for one week with no improvement.  Ultrasound superficial lump 12/16/2023   FINDINGS: Heterogeneous solid and cystic mass corresponding to the palpable abnormality in the midline of the upper abdominal wall measures 3.5 x 2.2 x 3.7 cm. There is internal vascularity by color Doppler.   IMPRESSION:  Heterogeneous solid and cystic mass corresponding to the palpable abnormality in the midline of the upper abdominal wall measures 3.5 x 2.2 x 3.7 cm. There is internal vascularity by color Doppler. Neoplastic versus nonneoplastic masses are in the   differential such as infected hematoma. Recommend tissue sampling and/or surgical consultation.                The following portions of the patient's history were reviewed and updated as appropriate:     He  has a past medical history of Elevated blood pressure reading and Exposure to SARS-associated coronavirus (11/12/2020).  He  has a past surgical history that includes Umbilical hernia repair (1985) and Knee arthroscopy (Left).  His family history includes Diabetes in his father; Hypertension in his father; Leukemia in his mother.  He  reports that he has never smoked. He has never used smokeless tobacco. He  "reports current alcohol use of about 5.0 standard drinks of alcohol per week. He reports that he does not use drugs.  Current Outpatient Medications   Medication Sig Dispense Refill    Naproxen Sodium (ALEVE PO) Take 200 mg by mouth 2 (two) times a day (Patient not taking: Reported on 12/12/2023)      sulfamethoxazole-trimethoprim (BACTRIM DS) 800-160 mg per tablet Take 1 tablet by mouth every 12 (twelve) hours for 7 days 14 tablet 0     No current facility-administered medications for this visit.     He is allergic to oxycodone-acetaminophen..    Review of Systems      Objective:      Ht 5' 6\" (1.676 m)   Wt 77.1 kg (170 lb)   BMI 27.44 kg/m²          Physical Exam  Incision and Drainage    Date/Time: 12/18/2023 10:45 AM    Performed by: Renny Corrigan MD  Authorized by: Renny Corrigan MD  Universal Protocol:  Patient understanding: patient states understanding of the procedure being performed    Location:     Type:  Abscess    Location:  Trunk    Trunk location:  Chest  Pre-procedure details:     Skin preparation:  Antiseptic wash  Anesthesia (see MAR for exact dosages):     Anesthesia method:  Local infiltration    Local anesthetic:  Lidocaine 1% WITH epi  Procedure details:     Complexity:  Intermediate    Incision types:  Elliptical    Approach:  Open    Incision depth:  Subcutaneous    Wound management:  Probed and deloculated    Drainage:  Purulent    Packing materials:  1/4 in gauze  Post-procedure details:     Patient tolerance of procedure:  Tolerated well, no immediate complications      "

## 2024-01-16 ENCOUNTER — TELEPHONE (OUTPATIENT)
Age: 64
End: 2024-01-16

## 2024-01-16 NOTE — TELEPHONE ENCOUNTER
I updated the diagnosis code from his visit in December. I am not sure if he had the blood tests already since I do not see the results.

## 2024-01-16 NOTE — TELEPHONE ENCOUNTER
Patient said his insurance will only cover the cost of the lipid panel and asked if diagnosis codes can be attached to the other labs so his insurance will cover the other labs. Please advise.

## 2024-01-23 LAB
ALBUMIN SERPL-MCNC: 4.3 G/DL (ref 3.6–5.1)
ALBUMIN/GLOB SERPL: 1.7 (CALC) (ref 1–2.5)
ALP SERPL-CCNC: 61 U/L (ref 35–144)
ALT SERPL-CCNC: 15 U/L (ref 9–46)
APPEARANCE UR: CLEAR
AST SERPL-CCNC: 25 U/L (ref 10–35)
BACTERIA UR QL AUTO: NORMAL /HPF
BASOPHILS # BLD AUTO: 48 CELLS/UL (ref 0–200)
BASOPHILS NFR BLD AUTO: 0.9 %
BILIRUB SERPL-MCNC: 0.5 MG/DL (ref 0.2–1.2)
BILIRUB UR QL STRIP: NEGATIVE
BUN SERPL-MCNC: 16 MG/DL (ref 7–25)
BUN/CREAT SERPL: NORMAL (CALC) (ref 6–22)
CALCIUM SERPL-MCNC: 9.7 MG/DL (ref 8.6–10.3)
CHLORIDE SERPL-SCNC: 103 MMOL/L (ref 98–110)
CHOLEST SERPL-MCNC: 181 MG/DL
CHOLEST/HDLC SERPL: 2.9 (CALC)
CO2 SERPL-SCNC: 27 MMOL/L (ref 20–32)
COLOR UR: YELLOW
CREAT SERPL-MCNC: 0.89 MG/DL (ref 0.7–1.35)
EOSINOPHIL # BLD AUTO: 302 CELLS/UL (ref 15–500)
EOSINOPHIL NFR BLD AUTO: 5.7 %
ERYTHROCYTE [DISTWIDTH] IN BLOOD BY AUTOMATED COUNT: 12.8 % (ref 11–15)
GFR/BSA.PRED SERPLBLD CYS-BASED-ARV: 95 ML/MIN/1.73M2
GLOBULIN SER CALC-MCNC: 2.5 G/DL (CALC) (ref 1.9–3.7)
GLUCOSE SERPL-MCNC: 87 MG/DL (ref 65–99)
GLUCOSE UR QL STRIP: NEGATIVE
HCT VFR BLD AUTO: 44.4 % (ref 38.5–50)
HDLC SERPL-MCNC: 62 MG/DL
HGB BLD-MCNC: 14.6 G/DL (ref 13.2–17.1)
HGB UR QL STRIP: NEGATIVE
HYALINE CASTS #/AREA URNS LPF: NORMAL /LPF
KETONES UR QL STRIP: NEGATIVE
LDLC SERPL CALC-MCNC: 106 MG/DL (CALC)
LEUKOCYTE ESTERASE UR QL STRIP: NEGATIVE
LYMPHOCYTES # BLD AUTO: 1102 CELLS/UL (ref 850–3900)
LYMPHOCYTES NFR BLD AUTO: 20.8 %
MCH RBC QN AUTO: 29.3 PG (ref 27–33)
MCHC RBC AUTO-ENTMCNC: 32.9 G/DL (ref 32–36)
MCV RBC AUTO: 89 FL (ref 80–100)
MONOCYTES # BLD AUTO: 864 CELLS/UL (ref 200–950)
MONOCYTES NFR BLD AUTO: 16.3 %
NEUTROPHILS # BLD AUTO: 2984 CELLS/UL (ref 1500–7800)
NEUTROPHILS NFR BLD AUTO: 56.3 %
NITRITE UR QL STRIP: NEGATIVE
NONHDLC SERPL-MCNC: 119 MG/DL (CALC)
PH UR STRIP: 6 [PH] (ref 5–8)
PLATELET # BLD AUTO: 217 THOUSAND/UL (ref 140–400)
PMV BLD REES-ECKER: 11.3 FL (ref 7.5–12.5)
POTASSIUM SERPL-SCNC: 4.7 MMOL/L (ref 3.5–5.3)
PROT SERPL-MCNC: 6.8 G/DL (ref 6.1–8.1)
PROT UR QL STRIP: NEGATIVE
PSA SERPL-MCNC: 1 NG/ML
RBC # BLD AUTO: 4.99 MILLION/UL (ref 4.2–5.8)
RBC #/AREA URNS HPF: NORMAL /HPF
SODIUM SERPL-SCNC: 138 MMOL/L (ref 135–146)
SP GR UR STRIP: 1.01 (ref 1–1.03)
SQUAMOUS #/AREA URNS HPF: NORMAL /HPF
TRIGL SERPL-MCNC: 50 MG/DL
WBC # BLD AUTO: 5.3 THOUSAND/UL (ref 3.8–10.8)
WBC #/AREA URNS HPF: NORMAL /HPF

## 2024-04-15 ENCOUNTER — VBI (OUTPATIENT)
Dept: ADMINISTRATIVE | Facility: OTHER | Age: 64
End: 2024-04-15

## 2024-11-27 ENCOUNTER — RA CDI HCC (OUTPATIENT)
Dept: OTHER | Facility: HOSPITAL | Age: 64
End: 2024-11-27

## 2024-12-02 NOTE — TELEPHONE ENCOUNTER
Reason for Disposition  • Swelling is painful to touch AND fever    Answer Assessment - Initial Assessment Questions  1. APPEARANCE of SWELLING: "What does it look like?" (e.g., lymph node, insect bite, mole)      "Unevenly shaped bump, hard to touch."   2. SIZE: "How large is the swelling?" (inches, cm or compare to coins)      2 inches x 1 inch. 3. LOCATION: "Where is the swelling located?"      Chest   4. ONSET: "When did the swelling start?"      Long term growth, became worse a week ago. 5. PAIN: "Is it painful?" If Yes, ask: "How much?"      Painful to touch. 6. ITCH: "Does it itch?" If Yes, ask: "How much?"      No   7. CAUSE: "What do you think caused the swelling?"      Unknown   8. OTHER SYMPTOMS: "Do you have any other symptoms?" (e.g., fever)      The cyst became red and blistery today. Patient has been taking Bactrim since 12/12/23.     Protocols used: Skin Lump or Localized Swelling-ADULT-[place] English

## 2024-12-09 ENCOUNTER — OFFICE VISIT (OUTPATIENT)
Dept: INTERNAL MEDICINE CLINIC | Facility: CLINIC | Age: 64
End: 2024-12-09
Payer: COMMERCIAL

## 2024-12-09 VITALS
WEIGHT: 173 LBS | HEART RATE: 70 BPM | OXYGEN SATURATION: 99 % | DIASTOLIC BLOOD PRESSURE: 64 MMHG | BODY MASS INDEX: 28.82 KG/M2 | HEIGHT: 65 IN | SYSTOLIC BLOOD PRESSURE: 122 MMHG

## 2024-12-09 DIAGNOSIS — E78.5 HYPERLIPIDEMIA, UNSPECIFIED HYPERLIPIDEMIA TYPE: ICD-10-CM

## 2024-12-09 DIAGNOSIS — Z12.11 SCREEN FOR COLON CANCER: ICD-10-CM

## 2024-12-09 DIAGNOSIS — M54.16 LUMBAR RADICULOPATHY: ICD-10-CM

## 2024-12-09 DIAGNOSIS — Z12.5 SCREENING PSA (PROSTATE SPECIFIC ANTIGEN): ICD-10-CM

## 2024-12-09 DIAGNOSIS — Z23 ENCOUNTER FOR IMMUNIZATION: ICD-10-CM

## 2024-12-09 DIAGNOSIS — Z00.00 MEDICARE ANNUAL WELLNESS VISIT, INITIAL: Primary | ICD-10-CM

## 2024-12-09 PROBLEM — L72.3 INFECTED SEBACEOUS CYST: Status: RESOLVED | Noted: 2023-12-18 | Resolved: 2024-12-09

## 2024-12-09 PROBLEM — L08.9 INFECTED SEBACEOUS CYST: Status: RESOLVED | Noted: 2023-12-18 | Resolved: 2024-12-09

## 2024-12-09 PROBLEM — R22.2 LOCALIZED SWELLING, MASS AND LUMP, TRUNK: Status: RESOLVED | Noted: 2023-12-12 | Resolved: 2024-12-09

## 2024-12-09 PROCEDURE — 90677 PCV20 VACCINE IM: CPT

## 2024-12-09 PROCEDURE — 90471 IMMUNIZATION ADMIN: CPT

## 2024-12-09 PROCEDURE — G0438 PPPS, INITIAL VISIT: HCPCS | Performed by: INTERNAL MEDICINE

## 2024-12-09 NOTE — PROGRESS NOTES
Name: Pavan Jimenez      : 1960      MRN: [id number]  Encounter Provider: Lea Reyes, MD  Encounter Date: 2024   Encounter department: [facility]  Medicare annual wellness visit, initial         Hyperlipidemia, unspecified hyperlipidemia type    Orders:    CT coronary calcium score; Future    CBC and differential; Future    Comprehensive metabolic panel; Future    Lipid panel; Future    Screening PSA (prostate specific antigen)    Orders:    PSA, Total Screen; Future    Screen for colon cancer    Orders:    Ambulatory Referral to Gastroenterology; Future    Encounter for immunization    Orders:    Pneumococcal Conjugate Vaccine 20-valent (Pcv20)    Lumbar radiculopathy  Chronic mild pain LLE, not limiting activity level          Preventive health issues were discussed with patient, and age appropriate screening tests were ordered as noted in patient's After Visit Summary. Personalized health advice and appropriate referrals for health education or preventive services given if needed, as noted in patient's After Visit Summary.    History of Present Illness     HPI   Patient Care Team:  Lea Reyes, MD as PCP - General  Cordelia Maldonado MD    Review of Systems   Constitutional:  Negative for unexpected weight change.   Respiratory:  Negative for shortness of breath.    Cardiovascular:  Negative for chest pain, palpitations and leg swelling.   Gastrointestinal:  Negative for abdominal pain, constipation and diarrhea.   Genitourinary:  Negative for difficulty urinating.   Musculoskeletal:         Chronic pain behind left leg   Neurological:  Negative for dizziness and headaches.     Medical History Reviewed by provider this encounter:  Tobacco  Allergies  Meds  Problems  Med Hx  Surg Hx  Fam Hx       Annual Wellness Visit Questionnaire   Pavan is here for his Initial Wellness visit.     Health Risk Assessment:   Patient rates overall health as very  good. Patient feels that their physical health rating is same. Patient is very satisfied with their life. Eyesight was rated as same. Hearing was rated as same. Patient feels that their emotional and mental health rating is same. Patients states they are never, rarely angry. Patient states they are never, rarely unusually tired/fatigued. Pain experienced in the last 7 days has been some. Patient's pain rating has been 3/10. Patient states that he has experienced no weight loss or gain in last 6 months.     Depression Screening:   PHQ-2 Score: 0      Fall Risk Screening:   In the past year, patient has experienced: no history of falling in past year      Home Safety:  Patient does not have trouble with stairs inside or outside of their home. Patient has working smoke alarms and has no working carbon monoxide detector. Home safety hazards include: none.     Nutrition:   Current diet is Regular.     Medications:   Patient is currently taking over-the-counter supplements. OTC medications include: see medication list. Patient is able to manage medications.     Activities of Daily Living (ADLs)/Instrumental Activities of Daily Living (IADLs):   Walk and transfer into and out of bed and chair?: Yes  Dress and groom yourself?: Yes    Bathe or shower yourself?: Yes    Feed yourself? Yes  Do your laundry/housekeeping?: Yes  Manage your money, pay your bills and track your expenses?: Yes  Make your own meals?: Yes    Do your own shopping?: Yes    Previous Hospitalizations:   Any hospitalizations or ED visits within the last 12 months?: No      Advance Care Planning:   Living will: No    Durable POA for healthcare: No    Advanced directive: No    Advanced directive counseling given: Yes    ACP document given: Yes    Patient declined ACP directive: Yes      Cognitive Screening:   Provider or family/friend/caregiver concerned regarding cognition?: No    PREVENTIVE SCREENINGS      Cardiovascular Screening:    General: Screening  "Current    Due for: Lipid Panel      Diabetes Screening:     General: Screening Current    Due for: Blood Glucose      Colorectal Cancer Screening:     General: Screening Current      Prostate Cancer Screening:    General: Screening Current    Due for: PSA      Abdominal Aortic Aneurysm (AAA) Screening:    Risk factors include: age between 65-74 yo        General: Screening Not Indicated      Lung Cancer Screening:     General: Screening Not Indicated      Hepatitis C Screening:    General: Screening Current    Screening, Brief Intervention, and Referral to Treatment (SBIRT)    Screening  Typical number of drinks in a day: 0  Typical number of drinks in a week: 5  Interpretation: Low risk drinking behavior.    Single Item Drug Screening:  How often have you used an illegal drug (including marijuana) or a prescription medication for non-medical reasons in the past year? never    Single Item Drug Screen Score: 0  Interpretation: Negative screen for possible drug use disorder    Social Drivers of Health     Financial Resource Strain: Low Risk  (12/8/2023)    Overall Financial Resource Strain (CARDIA)     Difficulty of Paying Living Expenses: Not hard at all   Food Insecurity: No Food Insecurity (12/9/2024)    Hunger Vital Sign     Worried About Running Out of Food in the Last Year: Never true     Ran Out of Food in the Last Year: Never true   Transportation Needs: No Transportation Needs (12/9/2024)    PRAPARE - Transportation     Lack of Transportation (Medical): No     Lack of Transportation (Non-Medical): No   Housing Stability: Low Risk  (12/9/2024)    Housing Stability Vital Sign     Unable to Pay for Housing in the Last Year: No     Number of Times Moved in the Last Year: 0     Homeless in the Last Year: No   Utilities: Not At Risk (12/9/2024)    [facility] Utilities     Threatened with loss of utilities: No     No results found.    Objective   /64   Pulse 70   Ht 5' 4.57\" (1.64 m)   Wt 78.5 kg (173 lb)   SpO2 " 99%   BMI 29.18 kg/m²     Physical Exam  Constitutional:       Appearance: He is well-developed. He is not ill-appearing.   HENT:      Right Ear: There is impacted cerumen.      Left Ear: Tympanic membrane and ear canal normal.   Eyes:      Conjunctiva/sclera: Conjunctivae normal.   Cardiovascular:      Rate and Rhythm: Normal rate and regular rhythm.      Heart sounds: Normal heart sounds. No murmur heard.  Pulmonary:      Effort: Pulmonary effort is normal. No respiratory distress.      Breath sounds: Normal breath sounds. No wheezing or rales.   Abdominal:      General: There is no distension.      Palpations: Abdomen is soft. There is no mass.      Tenderness: There is no abdominal tenderness. There is no guarding or rebound.   Musculoskeletal:      Cervical back: Neck supple.      Lumbar back: Negative right straight leg raise test and negative left straight leg raise test.      Right lower leg: No edema.      Left lower leg: No edema.   Neurological:      Mental Status: He is alert and oriented to person, place, and time.      Motor: Weakness (5/5 LLE) present.   Psychiatric:         Mood and Affect: Mood normal.         Behavior: Behavior normal.         Thought Content: Thought content normal.         Judgment: Judgment normal.

## 2024-12-09 NOTE — PATIENT INSTRUCTIONS
Medicare Preventive Visit Patient Instructions  Thank you for completing your Welcome to Medicare Visit or Medicare Annual Wellness Visit today. Your next wellness visit will be due in one year (12/10/2025).  The screening/preventive services that you may require over the next 5-10 years are detailed below. Some tests may not apply to you based off risk factors and/or age. Screening tests ordered at today's visit but not completed yet may show as past due. Also, please note that scanned in results may not display below.  Preventive Screenings:  Service Recommendations Previous Testing/Comments   Colorectal Cancer Screening  Colonoscopy    Fecal Occult Blood Test (FOBT)/Fecal Immunochemical Test (FIT)  Fecal DNA/Cologuard Test  Flexible Sigmoidoscopy Age: 45-75 years old   Colonoscopy: every 10 years (May be performed more frequently if at higher risk)  OR  FOBT/FIT: every 1 year  OR  Cologuard: every 3 years  OR  Sigmoidoscopy: every 5 years  Screening may be recommended earlier than age 45 if at higher risk for colorectal cancer. Also, an individualized decision between you and your healthcare provider will decide whether screening between the ages of 76-85 would be appropriate. Colonoscopy: 07/17/2015  FOBT/FIT: Not on file  Cologuard: Not on file  Sigmoidoscopy: Not on file          Prostate Cancer Screening Individualized decision between patient and health care provider in men between ages of 55-69   Medicare will cover every 12 months beginning on the day after your 50th birthday PSA: 1.00 ng/mL           Hepatitis C Screening Once for adults born between 1945 and 1965  More frequently in patients at high risk for Hepatitis C Hep C Antibody: 10/23/2019        Diabetes Screening 1-2 times per year if you're at risk for diabetes or have pre-diabetes Fasting glucose: 94 mg/dL (12/13/2022)  A1C: No results in last 5 years (No results in last 5 years)      Cholesterol Screening Once every 5 years if you don't have  a lipid disorder. May order more often based on risk factors. Lipid panel: 01/23/2024         Other Preventive Screenings Covered by Medicare:  Abdominal Aortic Aneurysm (AAA) Screening: covered once if your at risk. You're considered to be at risk if you have a family history of AAA or a male between the age of 65-75 who smoking at least 100 cigarettes in your lifetime.  Lung Cancer Screening: covers low dose CT scan once per year if you meet all of the following conditions: (1) Age 55-77; (2) No signs or symptoms of lung cancer; (3) Current smoker or have quit smoking within the last 15 years; (4) You have a tobacco smoking history of at least 20 pack years (packs per day x number of years you smoked); (5) You get a written order from a healthcare provider.  Glaucoma Screening: covered annually if you're considered high risk: (1) You have diabetes OR (2) Family history of glaucoma OR (3)  aged 50 and older OR (4)  American aged 65 and older  Osteoporosis Screening: covered every 2 years if you meet one of the following conditions: (1) Have a vertebral abnormality; (2) On glucocorticoid therapy for more than 3 months; (3) Have primary hyperparathyroidism; (4) On osteoporosis medications and need to assess response to drug therapy.  HIV Screening: covered annually if you're between the age of 15-65. Also covered annually if you are younger than 15 and older than 65 with risk factors for HIV infection. For pregnant patients, it is covered up to 3 times per pregnancy.    Immunizations:  Immunization Recommendations   Influenza Vaccine Annual influenza vaccination during flu season is recommended for all persons aged >= 6 months who do not have contraindications   Pneumococcal Vaccine   * Pneumococcal conjugate vaccine = PCV13 (Prevnar 13), PCV15 (Vaxneuvance), PCV20 (Prevnar 20)  * Pneumococcal polysaccharide vaccine = PPSV23 (Pneumovax) Adults 19-63 yo with certain risk factors or if 65+  yo  If never received any pneumonia vaccine: recommend Prevnar 20 (PCV20)  Give PCV20 if previously received 1 dose of PCV13 or PPSV23   Hepatitis B Vaccine 3 dose series if at intermediate or high risk (ex: diabetes, end stage renal disease, liver disease)   Respiratory syncytial virus (RSV) Vaccine - COVERED BY MEDICARE PART D  * RSVPreF3 (Arexvy) CDC recommends that adults 60 years of age and older may receive a single dose of RSV vaccine using shared clinical decision-making (SCDM)   Tetanus (Td) Vaccine - COST NOT COVERED BY MEDICARE PART B Following completion of primary series, a booster dose should be given every 10 years to maintain immunity against tetanus. Td may also be given as tetanus wound prophylaxis.   Tdap Vaccine - COST NOT COVERED BY MEDICARE PART B Recommended at least once for all adults. For pregnant patients, recommended with each pregnancy.   Shingles Vaccine (Shingrix) - COST NOT COVERED BY MEDICARE PART B  2 shot series recommended in those 19 years and older who have or will have weakened immune systems or those 50 years and older     Health Maintenance Due:      Topic Date Due   • Colorectal Cancer Screening  07/17/2025   • Hepatitis C Screening  Completed     Immunizations Due:      Topic Date Due   • IPV Vaccine (2 of 3 - Adult catch-up series) 10/10/2013   • Pneumococcal Vaccine: 65+ Years (1 of 1 - PCV) Never done     Advance Directives   What are advance directives?  Advance directives are legal documents that state your wishes and plans for medical care. These plans are made ahead of time in case you lose your ability to make decisions for yourself. Advance directives can apply to any medical decision, such as the treatments you want, and if you want to donate organs.   What are the types of advance directives?  There are many types of advance directives, and each state has rules about how to use them. You may choose a combination of any of the following:  Living will:  This is a  written record of the treatment you want. You can also choose which treatments you do not want, which to limit, and which to stop at a certain time. This includes surgery, medicine, IV fluid, and tube feedings.   Durable power of  for healthcare (DPAHC):  This is a written record that states who you want to make healthcare choices for you when you are unable to make them for yourself. This person, called a proxy, is usually a family member or a friend. You may choose more than 1 proxy.  Do not resuscitate (DNR) order:  A DNR order is used in case your heart stops beating or you stop breathing. It is a request not to have certain forms of treatment, such as CPR. A DNR order may be included in other types of advance directives.  Medical directive:  This covers the care that you want if you are in a coma, near death, or unable to make decisions for yourself. You can list the treatments you want for each condition. Treatment may include pain medicine, surgery, blood transfusions, dialysis, IV or tube feedings, and a ventilator (breathing machine).  Values history:  This document has questions about your views, beliefs, and how you feel and think about life. This information can help others choose the care that you would choose.  Why are advance directives important?  An advance directive helps you control your care. Although spoken wishes may be used, it is better to have your wishes written down. Spoken wishes can be misunderstood, or not followed. Treatments may be given even if you do not want them. An advance directive may make it easier for your family to make difficult choices about your care.   Weight Management   Why it is important to manage your weight:  Being overweight increases your risk of health conditions such as heart disease, high blood pressure, type 2 diabetes, and certain types of cancer. It can also increase your risk for osteoarthritis, sleep apnea, and other respiratory problems. Aim for  a slow, steady weight loss. Even a small amount of weight loss can lower your risk of health problems.  How to lose weight safely:  A safe and healthy way to lose weight is to eat fewer calories and get regular exercise. You can lose up about 1 pound a week by decreasing the number of calories you eat by 500 calories each day.   Healthy meal plan for weight management:  A healthy meal plan includes a variety of foods, contains fewer calories, and helps you stay healthy. A healthy meal plan includes the following:  Eat whole-grain foods more often.  A healthy meal plan should contain fiber. Fiber is the part of grains, fruits, and vegetables that is not broken down by your body. Whole-grain foods are healthy and provide extra fiber in your diet. Some examples of whole-grain foods are whole-wheat breads and pastas, oatmeal, brown rice, and bulgur.  Eat a variety of vegetables every day.  Include dark, leafy greens such as spinach, kale, murtaza greens, and mustard greens. Eat yellow and orange vegetables such as carrots, sweet potatoes, and winter squash.   Eat a variety of fruits every day.  Choose fresh or canned fruit (canned in its own juice or light syrup) instead of juice. Fruit juice has very little or no fiber.  Eat low-fat dairy foods.  Drink fat-free (skim) milk or 1% milk. Eat fat-free yogurt and low-fat cottage cheese. Try low-fat cheeses such as mozzarella and other reduced-fat cheeses.  Choose meat and other protein foods that are low in fat.  Choose beans or other legumes such as split peas or lentils. Choose fish, skinless poultry (chicken or turkey), or lean cuts of red meat (beef or pork). Before you cook meat or poultry, cut off any visible fat.   Use less fat and oil.  Try baking foods instead of frying them. Add less fat, such as margarine, sour cream, regular salad dressing and mayonnaise to foods. Eat fewer high-fat foods. Some examples of high-fat foods include french fries, doughnuts, ice  cream, and cakes.  Eat fewer sweets.  Limit foods and drinks that are high in sugar. This includes candy, cookies, regular soda, and sweetened drinks.  Exercise:  Exercise at least 30 minutes per day on most days of the week. Some examples of exercise include walking, biking, dancing, and swimming. You can also fit in more physical activity by taking the stairs instead of the elevator or parking farther away from stores. Ask your healthcare provider about the best exercise plan for you.      © Copyright [employer] 2018 Information is for End User's use only and may not be sold, redistributed or otherwise used for commercial purposes. All illustrations and images included in CareNotes® are the copyrighted property of A.D.A.M., Inc. or [employer]

## 2025-01-14 ENCOUNTER — APPOINTMENT (OUTPATIENT)
Dept: RADIOLOGY | Age: 65
End: 2025-01-14
Payer: COMMERCIAL

## 2025-01-14 ENCOUNTER — OFFICE VISIT (OUTPATIENT)
Dept: URGENT CARE | Age: 65
End: 2025-01-14
Payer: COMMERCIAL

## 2025-01-14 VITALS
TEMPERATURE: 96.5 F | RESPIRATION RATE: 16 BRPM | OXYGEN SATURATION: 98 % | HEART RATE: 80 BPM | DIASTOLIC BLOOD PRESSURE: 82 MMHG | SYSTOLIC BLOOD PRESSURE: 130 MMHG | WEIGHT: 172 LBS | BODY MASS INDEX: 28.66 KG/M2 | HEIGHT: 65 IN

## 2025-01-14 DIAGNOSIS — S49.91XA SHOULDER INJURY, RIGHT, INITIAL ENCOUNTER: Primary | ICD-10-CM

## 2025-01-14 DIAGNOSIS — S49.91XA SHOULDER INJURY, RIGHT, INITIAL ENCOUNTER: ICD-10-CM

## 2025-01-14 PROCEDURE — 99213 OFFICE O/P EST LOW 20 MIN: CPT

## 2025-01-14 PROCEDURE — 73030 X-RAY EXAM OF SHOULDER: CPT

## 2025-01-14 PROCEDURE — 99214 OFFICE O/P EST MOD 30 MIN: CPT

## 2025-01-14 RX ORDER — MULTIVITAMIN
1 TABLET ORAL DAILY
COMMUNITY

## 2025-01-14 NOTE — PATIENT INSTRUCTIONS
The radiologist did not see any evidence of acute fractures or dislocations on the xray.   Xray right shoulder: Negative for acte fracture or dislocation identified by me.   You may monitor MyChart for your results.  Take 600 to 800 mg of ibuprofen every 8 hours.  Supplement with Tylenol 1000 mg every 8 hours as needed, alternating every 4 hours with ibuprofen.  Ice 20 minutes on 20 minutes off.  Elevate above the level of the heart whenever not in use.  You may use the sling for comfort, however gentle range of motion movement to prevent frozen shoulder.   If symptoms or not improved in 3 to 5 days follow-up with PCP or Ortho.  If symptoms worsen or new symptoms develop report to the emergency room immediately.      Please go to emergency department if you develop any neck or back pain, difficulty breathing, or taking a deep breath in, feel your shoulder is out of place.    Follow up with orthopedics in 1 week.  Orthopedic referral has been placed.

## 2025-01-14 NOTE — PROGRESS NOTES
[facility]        NAME: Pavan Jimenez is a 64 y.o. male  : 1960    MRN: [id number]  DATE: 2025  TIME: 5:47 PM    Assessment and Plan   Shoulder injury, right, initial encounter [S49.91XA]  1. Shoulder injury, right, initial encounter  XR shoulder 2+ vw right    Ambulatory Referral to Orthopedic Surgery        Xray right shoulder: IMPRESSION: No evidence of acute fracture per final read.  Right shoulder sling, PMS intact prior to and after sling application.   Follow up with ortho.     Patient Instructions       Follow up with PCP in 3-5 days.  Proceed to  ER if symptoms worsen.    If tests have been performed at [facility], our office will contact you with results if changes need to be made to the care plan discussed with you at the visit.  [facility]    Chief Complaint     Chief Complaint   Patient presents with   • Shoulder Injury     Pt states he fell on right shoulder while ice skating yesterday. Limb feels as if its popping in and out of joint. ROM limited. Has taken advil for pain.          History of Present Illness       Patient is a 64-year-old male presenting with right shoulder pain for 2 days.  He reports ice-skating yesterday, and slamming into the boards on his right side.  At that time he felt like his right shoulder dislocated twice, however was able to reduce it himself.  He denies numbness, tingling, weakness to the right upper extremity.  He is right-hand dominant.  He denies icing the area.  He has taken Advil for his pain with moderate relief.  Denies previous right shoulder injuries.  During time of impact of the fall, he denies head strike, neck pain, back pain.  He does not take any aspirin or blood thinners.  Denies chest pain, shortness of breath, difficulty breathing.    Shoulder Injury         Review of Systems   Review of Systems   Musculoskeletal:  Positive for arthralgias and joint swelling.         Current  "Medications       Current Outpatient Medications:   •  Multiple Vitamin (multivitamin) tablet, Take 1 tablet by mouth daily, Disp: , Rfl:   •  Naproxen Sodium (ALEVE PO), Take 200 mg by mouth 2 (two) times a day (Patient not taking: Reported on 12/12/2023), Disp: , Rfl:     Current Allergies     Allergies as of 01/14/2025 - Reviewed 01/14/2025   Allergen Reaction Noted   • Oxycodone-acetaminophen Nausea Only 09/20/2012            The following portions of the patient's history were reviewed and updated as appropriate: allergies, current medications, past family history, past medical history, past social history, past surgical history and problem list.     Past Medical History:   Diagnosis Date   • Elevated blood pressure reading     Last assessed: 3/24/15   • Exposure to SARS-associated coronavirus 11/12/2020   • Infected sebaceous cyst 12/18/2023   • Localized swelling, mass and lump, trunk 12/12/2023       Past Surgical History:   Procedure Laterality Date   • KNEE ARTHROSCOPY Left    • UMBILICAL HERNIA REPAIR  1985       Family History   Problem Relation Age of Onset   • Leukemia Mother    • Diabetes Father         Mellitus   • Hypertension Father          Medications have been verified.        Objective   /82   Pulse 80   Temp (!) 96.5 °F (35.8 °C)   Resp 16   Ht 5' 5\" (1.651 m)   Wt 78 kg (172 lb)   SpO2 98%   BMI 28.62 kg/m²   No LMP for male patient.       Physical Exam     Physical Exam  Vitals and nursing note reviewed.   Constitutional:       General: He is not in acute distress.     Appearance: Normal appearance. He is normal weight. He is not ill-appearing or toxic-appearing.   HENT:      Head: Normocephalic.   Pulmonary:      Effort: Pulmonary effort is normal. No respiratory distress.      Breath sounds: Normal breath sounds. No stridor. No wheezing, rhonchi or rales.   Chest:      Chest wall: No tenderness.   Musculoskeletal:      Right shoulder: Swelling and tenderness present. No " deformity, laceration, bony tenderness or crepitus. Decreased range of motion. Normal strength. Normal pulse.        Arms:    Skin:     General: Skin is warm.      Capillary Refill: Capillary refill takes less than 2 seconds.   Neurological:      Mental Status: He is alert.

## 2025-01-16 ENCOUNTER — APPOINTMENT (OUTPATIENT)
Age: 65
End: 2025-01-16
Payer: COMMERCIAL

## 2025-01-16 ENCOUNTER — OFFICE VISIT (OUTPATIENT)
Age: 65
End: 2025-01-16
Payer: COMMERCIAL

## 2025-01-16 DIAGNOSIS — S49.91XA SHOULDER INJURY, RIGHT, INITIAL ENCOUNTER: Primary | ICD-10-CM

## 2025-01-16 DIAGNOSIS — S49.91XA SHOULDER INJURY, RIGHT, INITIAL ENCOUNTER: ICD-10-CM

## 2025-01-16 PROCEDURE — 99204 OFFICE O/P NEW MOD 45 MIN: CPT | Performed by: ORTHOPAEDIC SURGERY

## 2025-01-16 PROCEDURE — 73020 X-RAY EXAM OF SHOULDER: CPT

## 2025-01-16 NOTE — PROGRESS NOTES
ASSESSMENT:  RIGHT shoulder pain, labral tear vs rotator cuff tear, after acute injury with multiple dislocations     PLAN:   Discussed treatment options-has had 3 dislocation events, now with weakness and apprehension, concern for cuff tear +/-labral tear   No lifting or reaching  Do not need to wear the sling unless dislocations continue  Can continue to use OTC meds as needed  Use ice for pain   MRI R shoulder ordered today   Follow up after MRI        REASON FOR VISIT  Chief Complaint   Patient presents with    Right Shoulder - Pain       HPI: Pavan Jimenez is a 64 y.o. year old right hand dominant male who presents with RIGHT shoulder pain.    Ice skating on Monday (1/13/25), fell into the boards on the R shoulder   Immediate pain at onset, felt dislocation, patient relocated his shoulder himself  Has had two more dislocations since injury, with overhead and reaching movements, both self reduced   Pain is tolerable at this time   No swelling or bruising   No nightime symptoms   Denies numbness or tingling     No previous dislocations or shoulder injuries      Taking Advil as needed         Past Medical and Surgical History:  Past Medical History:   Diagnosis Date    Elevated blood pressure reading     Last assessed: 3/24/15    Exposure to SARS-associated coronavirus 11/12/2020    Infected sebaceous cyst 12/18/2023    Localized swelling, mass and lump, trunk 12/12/2023       Past Surgical History:   Procedure Laterality Date    KNEE ARTHROSCOPY Left     UMBILICAL HERNIA REPAIR  1985       Medications:    Current Outpatient Medications:     Multiple Vitamin (multivitamin) tablet, Take 1 tablet by mouth daily, Disp: , Rfl:     Naproxen Sodium (ALEVE PO), Take 200 mg by mouth 2 (two) times a day (Patient not taking: Reported on 12/12/2023), Disp: , Rfl:     Allergies:  Allergies   Allergen Reactions    Oxycodone-Acetaminophen Nausea Only         PE:  Pertinent Positive Findings in shoulder exam:    Motion  (AROM-PROM):    Forward Flexion R: 90 (120) L : 160   Abduction: R: 90 L: 140   External Rotation: R: 60 L : 60  Internal rotation Adduction: R: L5 L: T10  ABduction/ER: Right 80@ 90 degrees, Left: 80@ 90 degrees   Abduction/lR: Right 30@ 90 degrees, Left: 80@ 90 degrees      RIGHT shoulder:    Supraspinatus strength 4/5   lnfraspinatus strength 5/5   Tenderness [] AC joint [] Biceps Groove [x] None   Cross body adduction [] Positive [x] Negative   Ortez' [] Positive [x] Negative   Neer's [] Positive [x] Negative   Empty Can [x] Positive [] Negative   ER lag [x] Positive [] Negative   Horn blower's [] Positive [x] Negative   Belly Press [] Positive [x] Negative   Lift Off [] Positive [x] Negative   Bear Hug [] Positive [x] Negative   Larimer's sign [x] Positive [] Negative   Speed's sign [] Positive [x] Negative     Instability:  Positive apprehension, + Gautam relocation, neg jerk test, neg modified posterior load & shift, neg sulcus sign    Sensory: Intact sensation in axillary, lateral antebrachial cutaneous, median, superficial branch radial, and ulnar nerve distributions.    Vascular exam: warm and well perfused digits. Skin: intact, no rashes or lesions.      Radiology: I independently reviewed and interpreted the images.  Radiographs: RIGHT shoulder X-Ray: no acute osseous abnormality       CC:  Lea Reyes, MD Hausman, Gina R, LUZ MARINA

## 2025-01-23 ENCOUNTER — PREP FOR PROCEDURE (OUTPATIENT)
Age: 65
End: 2025-01-23

## 2025-01-23 ENCOUNTER — HOSPITAL ENCOUNTER (OUTPATIENT)
Dept: MRI IMAGING | Facility: HOSPITAL | Age: 65
End: 2025-01-23
Payer: COMMERCIAL

## 2025-01-23 ENCOUNTER — OFFICE VISIT (OUTPATIENT)
Age: 65
End: 2025-01-23
Payer: COMMERCIAL

## 2025-01-23 DIAGNOSIS — Z01.818 PRE-OP TESTING: ICD-10-CM

## 2025-01-23 DIAGNOSIS — S46.011A TRAUMATIC COMPLETE TEAR OF RIGHT ROTATOR CUFF, INITIAL ENCOUNTER: Primary | ICD-10-CM

## 2025-01-23 DIAGNOSIS — S49.91XA SHOULDER INJURY, RIGHT, INITIAL ENCOUNTER: ICD-10-CM

## 2025-01-23 PROCEDURE — 99214 OFFICE O/P EST MOD 30 MIN: CPT | Performed by: ORTHOPAEDIC SURGERY

## 2025-01-23 PROCEDURE — 73221 MRI JOINT UPR EXTREM W/O DYE: CPT

## 2025-01-23 RX ORDER — SODIUM CHLORIDE, SODIUM LACTATE, POTASSIUM CHLORIDE, CALCIUM CHLORIDE 600; 310; 30; 20 MG/100ML; MG/100ML; MG/100ML; MG/100ML
125 INJECTION, SOLUTION INTRAVENOUS CONTINUOUS
OUTPATIENT
Start: 2025-01-23

## 2025-01-23 RX ORDER — CHLORHEXIDINE GLUCONATE ORAL RINSE 1.2 MG/ML
15 SOLUTION DENTAL ONCE
OUTPATIENT
Start: 2025-01-23 | End: 2025-01-23

## 2025-01-23 NOTE — PROGRESS NOTES
ASSESSMENT:  RIGHT shoulder pain, labral tear and complete rotator cuff tear with multiple instability events    PLAN:   Discussed treatment options  Conservative vs non-conservative options discussed  Patient would like to pursue surgery at this time   Surgery risks and benefits discussed   Follow-up for RIGHT arthroscopic rotator cuff repair and labrum repair, possible debridement   Post-op sling provided today       REASON FOR VISIT  No chief complaint on file.    INTERVAL HPI (1/23/25)  Pain is tolerable at this time   One more dislocation since last seen, with reaching forward movement      HPI (1/16/25): Pavan Jimenez is a 64 y.o. year old right hand dominant male who presents with RIGHT shoulder pain.    Ice skating on Monday (1/13/25), fell into the boards on the R shoulder   Immediate pain at onset, felt dislocation, patient relocated his shoulder himself  Has had two more dislocations since injury, with overhead and reaching movements, both self reduced   Pain is tolerable at this time   No swelling or bruising   No nightime symptoms   Denies numbness or tingling     No previous dislocations or shoulder injuries      Taking Advil as needed         Past Medical and Surgical History:  Past Medical History:   Diagnosis Date    Elevated blood pressure reading     Last assessed: 3/24/15    Exposure to SARS-associated coronavirus 11/12/2020    Infected sebaceous cyst 12/18/2023    Localized swelling, mass and lump, trunk 12/12/2023       Past Surgical History:   Procedure Laterality Date    KNEE ARTHROSCOPY Left     UMBILICAL HERNIA REPAIR  1985       Medications:    Current Outpatient Medications:     Multiple Vitamin (multivitamin) tablet, Take 1 tablet by mouth daily, Disp: , Rfl:     Naproxen Sodium (ALEVE PO), Take 200 mg by mouth 2 (two) times a day (Patient not taking: Reported on 12/12/2023), Disp: , Rfl:     Allergies:  Allergies   Allergen Reactions    Oxycodone-Acetaminophen Nausea Only          PE:  Pertinent Positive Findings in shoulder exam:    Motion (AROM-PROM):    Forward Flexion R: 90 (120) L : 160   Abduction: R: 90 L: 140   External Rotation: R: 60 L : 60  Internal rotation Adduction: R: L5 L: T10  ABduction/ER: Right 80@ 90 degrees, Left: 80@ 90 degrees   Abduction/lR: Right 30@ 90 degrees, Left: 80@ 90 degrees      RIGHT shoulder:    Supraspinatus strength 4/5   lnfraspinatus strength 5/5   Tenderness [] AC joint [] Biceps Groove [x] None   Cross body adduction [] Positive [x] Negative   Ortez' [] Positive [x] Negative   Neer's [] Positive [x] Negative   Empty Can [x] Positive [] Negative   ER lag [x] Positive [] Negative   Horn blower's [] Positive [x] Negative   Belly Press [] Positive [x] Negative   Lift Off [] Positive [x] Negative   Bear Hug [] Positive [x] Negative   Trumbull's sign [x] Positive [] Negative   Speed's sign [] Positive [x] Negative     Instability:  Positive apprehension, + Gautam relocation, neg jerk test, neg modified posterior load & shift, neg sulcus sign    Sensory: Intact sensation in axillary, lateral antebrachial cutaneous, median, superficial branch radial, and ulnar nerve distributions.    Vascular exam: warm and well perfused digits. Skin: intact, no rashes or lesions.      Radiology: I independently reviewed and interpreted the images.  Radiographs: RIGHT shoulder X-Ray: no acute osseous abnormality     MRI shoulder right wo contrast (1/23/25)  Small Hill-Sachs lesion with associated soft tissue Bankart.     Essentially complete supraspinatus and infraspinatus tears. A few anterior and posterior fibers remain intact    Soft tissue Bankart with displaced labral tear         CC:  Lea Reyes, MD No ref. provider found

## 2025-01-24 ENCOUNTER — TELEPHONE (OUTPATIENT)
Dept: INTERNAL MEDICINE CLINIC | Facility: CLINIC | Age: 65
End: 2025-01-24

## 2025-01-24 NOTE — TELEPHONE ENCOUNTER
LM to CB to schedule Pre Op. Surgery date 2/19/25 needs to be at least 1 week ahead of appt. Can be with any provider.

## 2025-01-27 ENCOUNTER — RA CDI HCC (OUTPATIENT)
Dept: OTHER | Facility: HOSPITAL | Age: 65
End: 2025-01-27

## 2025-01-27 RX ORDER — TRETINOIN 0.25 MG/G
CREAM TOPICAL
COMMUNITY
Start: 2024-04-11

## 2025-01-28 ENCOUNTER — CONSULT (OUTPATIENT)
Dept: INTERNAL MEDICINE CLINIC | Facility: CLINIC | Age: 65
End: 2025-01-28
Payer: COMMERCIAL

## 2025-01-28 VITALS
DIASTOLIC BLOOD PRESSURE: 80 MMHG | HEART RATE: 64 BPM | BODY MASS INDEX: 28.72 KG/M2 | SYSTOLIC BLOOD PRESSURE: 146 MMHG | OXYGEN SATURATION: 100 % | HEIGHT: 65 IN | WEIGHT: 172.4 LBS

## 2025-01-28 DIAGNOSIS — Z01.818 PRE-OP TESTING: ICD-10-CM

## 2025-01-28 DIAGNOSIS — E78.5 HYPERLIPIDEMIA, UNSPECIFIED HYPERLIPIDEMIA TYPE: ICD-10-CM

## 2025-01-28 DIAGNOSIS — S46.011A TRAUMATIC COMPLETE TEAR OF RIGHT ROTATOR CUFF, INITIAL ENCOUNTER: ICD-10-CM

## 2025-01-28 DIAGNOSIS — Z01.818 PREOP EXAMINATION: Primary | ICD-10-CM

## 2025-01-28 DIAGNOSIS — R03.0 WHITE COAT SYNDROME WITHOUT DIAGNOSIS OF HYPERTENSION: ICD-10-CM

## 2025-01-28 PROCEDURE — 93000 ELECTROCARDIOGRAM COMPLETE: CPT

## 2025-01-28 PROCEDURE — 99214 OFFICE O/P EST MOD 30 MIN: CPT

## 2025-01-28 PROCEDURE — G2211 COMPLEX E/M VISIT ADD ON: HCPCS

## 2025-01-28 NOTE — ASSESSMENT & PLAN NOTE
This is 64-year-old male presented for preop evaluation for right shoulder surgery under general anesthesia.    No past medical history of complication or local/general anesthesia.  No significant past medical history of CHF/severe/pulmonary/renal issue.    plan:  Patient is stable for surgery

## 2025-01-28 NOTE — PROGRESS NOTES
Presurgical Evaluation    Subjective: Patient is stable for the surgery.     Patient ID: Pavan Jimenez is a 64 y.o. male.    Chief Complaint   Patient presents with    Pre-op Exam       This is 64-year-old male presented for preop evaluation for elective right rotator cuff labrum tear surgery.  Patient does not have any significant cardiovascular/pulmonary/renal/CNS past medical history.  Patient is stable for surgery.            The following portions of the patient's history were reviewed and updated as appropriate: allergies, current medications, past family history, past medical history, past social history, past surgical history, and problem list.    Procedure date: February 19, 2025    Surgeon: Dr. [place] Galindo  Planned procedure: Elective  Diagnosis for procedure: Right shoulder labral tear, complete rotator cuff tear    Prior anesthesia: Yes   General; Complications:  None / Tolerated well  Local; Complications:  None / Tolerated well    CAD History: None   NOTE: Patient should see Cardiology if time available before surgery, and if appropriate.    Pulmonary History: None    Renal history: None    Diabetes History:  None     Neurological History: None     On Immunosuppressant meds/biologics: No      Review of Systems   Constitutional: Negative.    Respiratory: Negative.     Cardiovascular: Negative.    Gastrointestinal: Negative.    Genitourinary: Negative.    Musculoskeletal: Negative.    Neurological: Negative.    Psychiatric/Behavioral: Negative.           Current Outpatient Medications   Medication Sig Dispense Refill    Multiple Vitamin (multivitamin) tablet Take 1 tablet by mouth daily      tretinoin (RETIN-A) 0.025 % cream Apply topically daily at bedtime (Patient not taking: Reported on 1/28/2025)       No current facility-administered medications for this visit.       Allergies on file:   Oxycodone-acetaminophen    Patient Active Problem List   Diagnosis    Piriformis syndrome of left side     "Chronic pain syndrome    Lumbar radiculopathy    Traumatic complete tear of right rotator cuff, initial encounter    Preop examination    White coat syndrome without diagnosis of hypertension        Past Medical History:   Diagnosis Date    Elevated blood pressure reading     Last assessed: 3/24/15    Exposure to SARS-associated coronavirus 11/12/2020    Infected sebaceous cyst 12/18/2023    Localized swelling, mass and lump, trunk 12/12/2023       Past Surgical History:   Procedure Laterality Date    KNEE ARTHROSCOPY Left     UMBILICAL HERNIA REPAIR  1985       Family History   Problem Relation Age of Onset    Leukemia Mother     Diabetes Father         Mellitus    Hypertension Father        Social History     Tobacco Use    Smoking status: Never    Smokeless tobacco: Never   Vaping Use    Vaping status: Never Used   Substance Use Topics    Alcohol use: Yes     Alcohol/week: 5.0 standard drinks of alcohol     Types: 3 Cans of beer, 2 Glasses of wine per week     Comment: Social    Drug use: Never       Objective:    Vitals:    01/28/25 1046   BP: 146/80   BP Location: Right arm   Patient Position: Sitting   Cuff Size: Standard   Pulse: 64   SpO2: 100%   Weight: 78.2 kg (172 lb 6.4 oz)   Height: 5' 5\" (1.651 m)        Physical Exam  Constitutional:       General: He is not in acute distress.     Appearance: Normal appearance. He is not ill-appearing, toxic-appearing or diaphoretic.   Cardiovascular:      Rate and Rhythm: Normal rate and regular rhythm.      Pulses: Normal pulses.      Heart sounds: Normal heart sounds. No murmur heard.     No friction rub. No gallop.   Pulmonary:      Effort: Pulmonary effort is normal. No respiratory distress.      Breath sounds: Normal breath sounds. No stridor. No wheezing, rhonchi or rales.   Chest:      Chest wall: No tenderness.   Abdominal:      General: There is no distension.      Palpations: Abdomen is soft. There is no mass.      Tenderness: There is no abdominal " tenderness. There is no guarding or rebound.      Hernia: No hernia is present.   Musculoskeletal:      Right lower leg: No edema.      Left lower leg: No edema.   Neurological:      General: No focal deficit present.      Mental Status: He is alert and oriented to person, place, and time. Mental status is at baseline.   Psychiatric:         Mood and Affect: Mood normal.         Behavior: Behavior normal.         Thought Content: Thought content normal.         Judgment: Judgment normal.           Preop labs/testing available and reviewed: yes               EKG yes    Echo no    Stress test/cath no    PFT/Chidi no    Functional capacity: Walking , 4-5 MPH               4 Mets   Pick the highest level patient can comfortably perform   4 mets or greater for surgery    RCRI  High Risk surgery?         1 Point  CAD History:         1 Point   MI; Positive Stress Test; CP due to Mi;  Nitrate Usage to control Angina; Pathologic Q wave on EKG  CHF Active:         1 Point   Pulm Edema; Paroxysmal Nocturnal Dyspnea;  Bibasilar Rales (crackles);S3; CHF on CXR  Cerebrovascular Disease (TIA or CVA):     1 Point  DM on Insulin:        1 Point  Serum Creat >2.0 mg/dl:       1 Point          Total Points: 0     Scorin: Class I, Very Low Risk (0.4%)     1: Class II, Low risk (0.9%)     2: Class III Moderate (6.6%)     3: Class IV High (>11%)      CORNELIUS Risk:  GFR:        For PCP:  If GFR>60, Hold ACE/ARB/Diuretic on the day of surgery, and NSAIDS 10 days before.    If GFR<45, Consider PRE and POST op Nephrology Consult.    If 46 <GFR> 59 : Has Patient had CORNELIUS in last 6 Months? no   If YES: Preop Nephrology consult   If No:  Post Op Nephrology consult.           Assessment/Plan:    (1) preop evaluation:  Patient is stable for the surgery.  No past medical history of CHF/CNS/pulmonary/renal pathology.  No need to change medication perioperatively.  No complications of local/general anesthesia in the past.  EKG on  did not show  any significant pathological findings.    (2) whitecoat hypertension without diagnosis of hypertension    Past medical history of whitecoat hypertension  At home, blood pressure is unremarkable.    Plan:  Monitor blood pressure at home        Patient is medically optimized (cleared) for the planned procedure.    Further testing/evaluation is not required.    Postop concerns: no    Problem List Items Addressed This Visit       Traumatic complete tear of right rotator cuff, initial encounter    Preop examination - Primary     This is 64-year-old male presented for preop evaluation for right shoulder surgery under general anesthesia.    No past medical history of complication or local/general anesthesia.  No significant past medical history of CHF/severe/pulmonary/renal issue.    plan:  Patient is stable for surgery         White coat syndrome without diagnosis of hypertension    At home Bp is wnl   no pmh of HTN    Plan:  Monitor BP at home            Other Visit Diagnoses         Pre-op testing        Relevant Orders    POCT ECG    CBC and differential    Comprehensive metabolic panel      Hyperlipidemia, unspecified hyperlipidemia type                 Diagnoses and all orders for this visit:    Preop examination    Traumatic complete tear of right rotator cuff, initial encounter  -     Ambulatory referral to Family Practice    Pre-op testing  -     Ambulatory referral to Family Practice  -     POCT ECG  -     CBC and differential; Future  -     Comprehensive metabolic panel; Future    Hyperlipidemia, unspecified hyperlipidemia type    White coat syndrome without diagnosis of hypertension    Other orders  -     tretinoin (RETIN-A) 0.025 % cream; Apply topically daily at bedtime (Patient not taking: Reported on 1/28/2025)          Pre-Surgery Instructions:   Medication Instructions    tretinoin (RETIN-A) 0.025 % cream per anesthesia guidelines         NOTE: Please use the above to review important meds for your  "specialty, the remainder \"per anesthesia Guidelines.\"    NOTE: Please place an Inbasket message for \"SOC\" pool for complicated patients.     "

## 2025-01-30 LAB
ALBUMIN SERPL-MCNC: 4.2 G/DL (ref 3.6–5.1)
ALBUMIN/GLOB SERPL: 1.8 (CALC) (ref 1–2.5)
ALP SERPL-CCNC: 65 U/L (ref 35–144)
ALT SERPL-CCNC: 17 U/L (ref 9–46)
AST SERPL-CCNC: 24 U/L (ref 10–35)
BASOPHILS # BLD AUTO: 38 CELLS/UL (ref 0–200)
BASOPHILS NFR BLD AUTO: 0.8 %
BILIRUB SERPL-MCNC: 0.7 MG/DL (ref 0.2–1.2)
BUN SERPL-MCNC: 20 MG/DL (ref 7–25)
BUN/CREAT SERPL: NORMAL (CALC) (ref 6–22)
CALCIUM SERPL-MCNC: 9.7 MG/DL (ref 8.6–10.3)
CHLORIDE SERPL-SCNC: 106 MMOL/L (ref 98–110)
CHOLEST SERPL-MCNC: 176 MG/DL
CHOLEST/HDLC SERPL: 2.9 (CALC)
CO2 SERPL-SCNC: 30 MMOL/L (ref 20–32)
CREAT SERPL-MCNC: 0.93 MG/DL (ref 0.7–1.35)
EOSINOPHIL # BLD AUTO: 322 CELLS/UL (ref 15–500)
EOSINOPHIL NFR BLD AUTO: 6.7 %
ERYTHROCYTE [DISTWIDTH] IN BLOOD BY AUTOMATED COUNT: 12.3 % (ref 11–15)
GFR/BSA.PRED SERPLBLD CYS-BASED-ARV: 91 ML/MIN/1.73M2
GLOBULIN SER CALC-MCNC: 2.3 G/DL (CALC) (ref 1.9–3.7)
GLUCOSE SERPL-MCNC: 86 MG/DL (ref 65–99)
HCT VFR BLD AUTO: 44.7 % (ref 38.5–50)
HDLC SERPL-MCNC: 61 MG/DL
HGB BLD-MCNC: 14.7 G/DL (ref 13.2–17.1)
LDLC SERPL CALC-MCNC: 102 MG/DL (CALC)
LYMPHOCYTES # BLD AUTO: 778 CELLS/UL (ref 850–3900)
LYMPHOCYTES NFR BLD AUTO: 16.2 %
MCH RBC QN AUTO: 29.9 PG (ref 27–33)
MCHC RBC AUTO-ENTMCNC: 32.9 G/DL (ref 32–36)
MCV RBC AUTO: 90.9 FL (ref 80–100)
MONOCYTES # BLD AUTO: 696 CELLS/UL (ref 200–950)
MONOCYTES NFR BLD AUTO: 14.5 %
NEUTROPHILS # BLD AUTO: 2966 CELLS/UL (ref 1500–7800)
NEUTROPHILS NFR BLD AUTO: 61.8 %
NONHDLC SERPL-MCNC: 115 MG/DL (CALC)
PLATELET # BLD AUTO: 233 THOUSAND/UL (ref 140–400)
PMV BLD REES-ECKER: 11.6 FL (ref 7.5–12.5)
POTASSIUM SERPL-SCNC: 4.3 MMOL/L (ref 3.5–5.3)
PROT SERPL-MCNC: 6.5 G/DL (ref 6.1–8.1)
PSA SERPL-MCNC: 1.05 NG/ML
RBC # BLD AUTO: 4.92 MILLION/UL (ref 4.2–5.8)
SODIUM SERPL-SCNC: 141 MMOL/L (ref 135–146)
TRIGL SERPL-MCNC: 45 MG/DL
WBC # BLD AUTO: 4.8 THOUSAND/UL (ref 3.8–10.8)

## 2025-02-05 ENCOUNTER — ANESTHESIA EVENT (OUTPATIENT)
Age: 65
End: 2025-02-05
Payer: COMMERCIAL

## 2025-02-06 ENCOUNTER — TELEPHONE (OUTPATIENT)
Age: 65
End: 2025-02-06

## 2025-02-06 NOTE — TELEPHONE ENCOUNTER
Patient called asking about insurance auth for upcoming surgery with Dr. Galindo on 2/19. States he spoke with his insurance and they do not have anything. I checked his chart and do not see an auth started. Please advise so I can update him. Thank you!

## 2025-02-10 ENCOUNTER — RESULTS FOLLOW-UP (OUTPATIENT)
Dept: INTERNAL MEDICINE CLINIC | Facility: CLINIC | Age: 65
End: 2025-02-10

## 2025-02-10 NOTE — PRE-PROCEDURE INSTRUCTIONS
Pre-Surgery Instructions:   Medication Instructions    Multiple Vitamin (multivitamin) tablet Stop taking 7 days prior to surgery.   Medication instructions for day surgery reviewed. Please use only a sip of water to take your instructed medications. Avoid all over the counter vitamins, supplements and NSAIDS for one week prior to surgery per anesthesia guidelines. Tylenol is ok to take as needed.     You will receive a call one business day prior to surgery with an arrival time and hospital directions. If your surgery is scheduled on a Monday, the hospital will be calling you on the Friday prior to your surgery. If you have not heard from anyone by 8pm, please call the hospital supervisor through the hospital  at [phone].  or [place] [phone]).    Do not eat or drink anything after midnight the night before your surgery, including candy, mints, lifesavers, or chewing gum. Do not drink alcohol 24hrs before your surgery. Try not to smoke at least 24hrs before your surgery.       Follow the pre surgery showering instructions as listed in the “My Surgical Experience Booklet” or otherwise provided by your surgeon's office. Do not use a blade to shave the surgical area 1 week before surgery. It is okay to use a clean electric clippers up to 24 hours before surgery. Do not apply any lotions, creams, including makeup, cologne, deodorant, or perfumes after showering on the day of your surgery. Do not use dry shampoo, hair spray, hair gel, or any type of hair products.     No contact lenses, eye make-up, or artificial eyelashes. Remove nail polish, including gel polish, and any artificial, gel, or acrylic nails if possible. Remove all jewelry including rings and body piercing jewelry.     Wear causal clothing that is easy to take on and off. Consider your type of surgery.    Keep any valuables, jewelry, piercings at home. Please bring any specially ordered equipment (sling, braces) if indicated.    Arrange  for a responsible person to drive you to and from the hospital on the day of your surgery. Please confirm the visitor policy for the day of your procedure when you receive your phone call with an arrival time.     Call the surgeon's office with any new illnesses, exposures, or additional questions prior to surgery.    Please reference your “My Surgical Experience Booklet” for additional information to prepare for your upcoming surgery.

## 2025-02-12 ENCOUNTER — RESULTS FOLLOW-UP (OUTPATIENT)
Dept: INTERNAL MEDICINE CLINIC | Facility: CLINIC | Age: 65
End: 2025-02-12

## 2025-02-19 ENCOUNTER — ANESTHESIA (OUTPATIENT)
Age: 65
End: 2025-02-19
Payer: COMMERCIAL

## 2025-02-19 ENCOUNTER — HOSPITAL ENCOUNTER (OUTPATIENT)
Age: 65
Setting detail: OUTPATIENT SURGERY
Discharge: HOME | End: 2025-02-19
Attending: ORTHOPAEDIC SURGERY | Admitting: ORTHOPAEDIC SURGERY
Payer: COMMERCIAL

## 2025-02-19 VITALS
OXYGEN SATURATION: 98 % | SYSTOLIC BLOOD PRESSURE: 135 MMHG | TEMPERATURE: 97.4 F | DIASTOLIC BLOOD PRESSURE: 70 MMHG | HEART RATE: 55 BPM | HEIGHT: 66 IN | RESPIRATION RATE: 16 BRPM | BODY MASS INDEX: 27.16 KG/M2 | WEIGHT: 169 LBS

## 2025-02-19 DIAGNOSIS — S46.011A TRAUMATIC COMPLETE TEAR OF RIGHT ROTATOR CUFF, INITIAL ENCOUNTER: Primary | ICD-10-CM

## 2025-02-19 PROCEDURE — 29826 SHO ARTHRS SRG DECOMPRESSION: CPT | Performed by: ORTHOPAEDIC SURGERY

## 2025-02-19 PROCEDURE — 29828 SHO ARTHRS SRG BICP TENODSIS: CPT

## 2025-02-19 PROCEDURE — 29826 SHO ARTHRS SRG DECOMPRESSION: CPT

## 2025-02-19 PROCEDURE — C1713 ANCHOR/SCREW BN/BN,TIS/BN: HCPCS | Performed by: ORTHOPAEDIC SURGERY

## 2025-02-19 PROCEDURE — 29806 SHO ARTHRS SRG CAPSULORRAPHY: CPT

## 2025-02-19 PROCEDURE — 29806 SHO ARTHRS SRG CAPSULORRAPHY: CPT | Performed by: ORTHOPAEDIC SURGERY

## 2025-02-19 PROCEDURE — 29827 SHO ARTHRS SRG RT8TR CUF RPR: CPT | Performed by: ORTHOPAEDIC SURGERY

## 2025-02-19 PROCEDURE — 29827 SHO ARTHRS SRG RT8TR CUF RPR: CPT

## 2025-02-19 PROCEDURE — 29828 SHO ARTHRS SRG BICP TENODSIS: CPT | Performed by: ORTHOPAEDIC SURGERY

## 2025-02-19 DEVICE — IMPLANTABLE DEVICE: Type: IMPLANTABLE DEVICE | Site: SHOULDER | Status: FUNCTIONAL

## 2025-02-19 DEVICE — SWIVELOCK, SP BC KL 4.75MM
Type: IMPLANTABLE DEVICE | Site: SHOULDER | Status: FUNCTIONAL
Brand: ARTHREX®

## 2025-02-19 DEVICE — 4.75MM BC KNOTLESS SWIVELOCK
Type: IMPLANTABLE DEVICE | Site: SHOULDER | Status: FUNCTIONAL
Brand: ARTHREX®

## 2025-02-19 DEVICE — SP FBRTAK RC FBRTPE BLK/BLU & STTPE BLU
Type: IMPLANTABLE DEVICE | Site: SHOULDER | Status: FUNCTIONAL
Brand: ARTHREX®

## 2025-02-19 DEVICE — SP FBRTAK RC TGRTPE WH/BLK & STTPE WH/BL
Type: IMPLANTABLE DEVICE | Site: SHOULDER | Status: FUNCTIONAL
Brand: ARTHREX®

## 2025-02-19 DEVICE — KL 1.8 FIBERTAK, SHOULDER
Type: IMPLANTABLE DEVICE | Site: SHOULDER | Status: FUNCTIONAL
Brand: ARTHREX®

## 2025-02-19 RX ORDER — BUPIVACAINE HYDROCHLORIDE 2.5 MG/ML
INJECTION, SOLUTION EPIDURAL; INFILTRATION; INTRACAUDAL AS NEEDED
Status: DISCONTINUED | OUTPATIENT
Start: 2025-02-19 | End: 2025-02-19 | Stop reason: HOSPADM

## 2025-02-19 RX ORDER — ONDANSETRON 2 MG/ML
4 INJECTION INTRAMUSCULAR; INTRAVENOUS ONCE AS NEEDED
Status: DISCONTINUED | OUTPATIENT
Start: 2025-02-19 | End: 2025-02-19 | Stop reason: HOSPADM

## 2025-02-19 RX ORDER — SODIUM CHLORIDE, SODIUM LACTATE, POTASSIUM CHLORIDE, CALCIUM CHLORIDE 600; 310; 30; 20 MG/100ML; MG/100ML; MG/100ML; MG/100ML
125 INJECTION, SOLUTION INTRAVENOUS CONTINUOUS
Status: DISCONTINUED | OUTPATIENT
Start: 2025-02-19 | End: 2025-02-19 | Stop reason: HOSPADM

## 2025-02-19 RX ORDER — CEFAZOLIN SODIUM 2 G/50ML
2000 SOLUTION INTRAVENOUS ONCE
Status: COMPLETED | OUTPATIENT
Start: 2025-02-19 | End: 2025-02-19

## 2025-02-19 RX ORDER — ONDANSETRON 2 MG/ML
INJECTION INTRAMUSCULAR; INTRAVENOUS AS NEEDED
Status: DISCONTINUED | OUTPATIENT
Start: 2025-02-19 | End: 2025-02-19

## 2025-02-19 RX ORDER — LIDOCAINE HYDROCHLORIDE 10 MG/ML
INJECTION, SOLUTION EPIDURAL; INFILTRATION; INTRACAUDAL; PERINEURAL AS NEEDED
Status: DISCONTINUED | OUTPATIENT
Start: 2025-02-19 | End: 2025-02-19

## 2025-02-19 RX ORDER — CHLORHEXIDINE GLUCONATE ORAL RINSE 1.2 MG/ML
15 SOLUTION DENTAL ONCE
Status: COMPLETED | OUTPATIENT
Start: 2025-02-19 | End: 2025-02-19

## 2025-02-19 RX ORDER — BUPIVACAINE HYDROCHLORIDE 5 MG/ML
INJECTION, SOLUTION EPIDURAL; INTRACAUDAL
Status: COMPLETED | OUTPATIENT
Start: 2025-02-19 | End: 2025-02-19

## 2025-02-19 RX ORDER — ONDANSETRON 4 MG/1
4 TABLET, FILM COATED ORAL EVERY 8 HOURS PRN
Qty: 10 TABLET | Refills: 0 | Status: SHIPPED | OUTPATIENT
Start: 2025-02-19

## 2025-02-19 RX ORDER — OXYCODONE HYDROCHLORIDE 5 MG/1
5 TABLET ORAL EVERY 4 HOURS PRN
Refills: 0 | Status: DISCONTINUED | OUTPATIENT
Start: 2025-02-19 | End: 2025-02-19 | Stop reason: HOSPADM

## 2025-02-19 RX ORDER — MAGNESIUM HYDROXIDE 1200 MG/15ML
LIQUID ORAL AS NEEDED
Status: DISCONTINUED | OUTPATIENT
Start: 2025-02-19 | End: 2025-02-19 | Stop reason: HOSPADM

## 2025-02-19 RX ORDER — MIDAZOLAM HYDROCHLORIDE 2 MG/2ML
INJECTION, SOLUTION INTRAMUSCULAR; INTRAVENOUS
Status: COMPLETED | OUTPATIENT
Start: 2025-02-19 | End: 2025-02-19

## 2025-02-19 RX ORDER — OXYCODONE HYDROCHLORIDE 5 MG/1
5 TABLET ORAL EVERY 4 HOURS PRN
Qty: 20 TABLET | Refills: 0 | Status: SHIPPED | OUTPATIENT
Start: 2025-02-19 | End: 2025-03-01

## 2025-02-19 RX ORDER — NAPROXEN 500 MG/1
500 TABLET ORAL 2 TIMES DAILY WITH MEALS
Qty: 42 TABLET | Refills: 0 | Status: SHIPPED | OUTPATIENT
Start: 2025-02-19

## 2025-02-19 RX ORDER — PROPOFOL 10 MG/ML
INJECTION, EMULSION INTRAVENOUS AS NEEDED
Status: DISCONTINUED | OUTPATIENT
Start: 2025-02-19 | End: 2025-02-19

## 2025-02-19 RX ORDER — FENTANYL CITRATE 50 UG/ML
INJECTION, SOLUTION INTRAMUSCULAR; INTRAVENOUS AS NEEDED
Status: DISCONTINUED | OUTPATIENT
Start: 2025-02-19 | End: 2025-02-19

## 2025-02-19 RX ORDER — ASPIRIN 325 MG
325 TABLET ORAL DAILY
Qty: 28 TABLET | Refills: 0 | Status: SHIPPED | OUTPATIENT
Start: 2025-02-19

## 2025-02-19 RX ORDER — DEXAMETHASONE SODIUM PHOSPHATE 10 MG/ML
INJECTION, SOLUTION INTRAMUSCULAR; INTRAVENOUS AS NEEDED
Status: DISCONTINUED | OUTPATIENT
Start: 2025-02-19 | End: 2025-02-19

## 2025-02-19 RX ORDER — HYDROMORPHONE HCL IN WATER/PF 6 MG/30 ML
0.2 PATIENT CONTROLLED ANALGESIA SYRINGE INTRAVENOUS
Status: DISCONTINUED | OUTPATIENT
Start: 2025-02-19 | End: 2025-02-19 | Stop reason: HOSPADM

## 2025-02-19 RX ORDER — ROCURONIUM BROMIDE 10 MG/ML
INJECTION, SOLUTION INTRAVENOUS AS NEEDED
Status: DISCONTINUED | OUTPATIENT
Start: 2025-02-19 | End: 2025-02-19

## 2025-02-19 RX ORDER — PROPOFOL 10 MG/ML
INJECTION, EMULSION INTRAVENOUS CONTINUOUS PRN
Status: DISCONTINUED | OUTPATIENT
Start: 2025-02-19 | End: 2025-02-19

## 2025-02-19 RX ORDER — FENTANYL CITRATE/PF 50 MCG/ML
25 SYRINGE (ML) INJECTION
Status: DISCONTINUED | OUTPATIENT
Start: 2025-02-19 | End: 2025-02-19 | Stop reason: HOSPADM

## 2025-02-19 RX ADMIN — BUPIVACAINE 10 ML: 13.3 INJECTION, SUSPENSION, LIPOSOMAL INFILTRATION at 13:15

## 2025-02-19 RX ADMIN — DEXAMETHASONE SODIUM PHOSPHATE 10 MG: 10 INJECTION, SOLUTION INTRAMUSCULAR; INTRAVENOUS at 13:45

## 2025-02-19 RX ADMIN — SUGAMMADEX 200 MG: 100 INJECTION, SOLUTION INTRAVENOUS at 15:05

## 2025-02-19 RX ADMIN — PHENYLEPHRINE HYDROCHLORIDE 50 MCG/MIN: 10 INJECTION INTRAVENOUS at 13:52

## 2025-02-19 RX ADMIN — SODIUM CHLORIDE, SODIUM LACTATE, POTASSIUM CHLORIDE, AND CALCIUM CHLORIDE: .6; .31; .03; .02 INJECTION, SOLUTION INTRAVENOUS at 14:24

## 2025-02-19 RX ADMIN — PROPOFOL 50 MCG/KG/MIN: 10 INJECTION, EMULSION INTRAVENOUS at 13:48

## 2025-02-19 RX ADMIN — PHENYLEPHRINE HYDROCHLORIDE 100 MCG: 10 INJECTION INTRAVENOUS at 14:19

## 2025-02-19 RX ADMIN — MIDAZOLAM 2 MG: 1 INJECTION INTRAMUSCULAR; INTRAVENOUS at 13:10

## 2025-02-19 RX ADMIN — PHENYLEPHRINE HYDROCHLORIDE 100 MCG: 10 INJECTION INTRAVENOUS at 14:48

## 2025-02-19 RX ADMIN — CHLORHEXIDINE GLUCONATE 15 ML: 1.2 SOLUTION ORAL at 12:27

## 2025-02-19 RX ADMIN — PROPOFOL 30 MCG/KG/MIN: 10 INJECTION, EMULSION INTRAVENOUS at 14:46

## 2025-02-19 RX ADMIN — PROPOFOL 200 MG: 10 INJECTION, EMULSION INTRAVENOUS at 13:41

## 2025-02-19 RX ADMIN — CEFAZOLIN SODIUM 2000 MG: 2 SOLUTION INTRAVENOUS at 13:46

## 2025-02-19 RX ADMIN — PHENYLEPHRINE HYDROCHLORIDE 100 MCG: 10 INJECTION INTRAVENOUS at 13:57

## 2025-02-19 RX ADMIN — BUPIVACAINE HYDROCHLORIDE 15 ML: 5 INJECTION, SOLUTION EPIDURAL; INTRACAUDAL; PERINEURAL at 13:15

## 2025-02-19 RX ADMIN — FENTANYL CITRATE 100 MCG: 50 INJECTION INTRAMUSCULAR; INTRAVENOUS at 13:40

## 2025-02-19 RX ADMIN — ONDANSETRON 4 MG: 2 INJECTION INTRAMUSCULAR; INTRAVENOUS at 14:56

## 2025-02-19 RX ADMIN — LIDOCAINE HYDROCHLORIDE 50 MG: 10 INJECTION, SOLUTION EPIDURAL; INFILTRATION; INTRACAUDAL; PERINEURAL at 13:40

## 2025-02-19 RX ADMIN — SODIUM CHLORIDE, SODIUM LACTATE, POTASSIUM CHLORIDE, AND CALCIUM CHLORIDE 125 ML/HR: .6; .31; .03; .02 INJECTION, SOLUTION INTRAVENOUS at 12:27

## 2025-02-19 RX ADMIN — ROCURONIUM BROMIDE 50 MG: 10 INJECTION, SOLUTION INTRAVENOUS at 13:42

## 2025-02-19 NOTE — ANESTHESIA PROCEDURE NOTES
Peripheral Block    Patient location during procedure: holding area  Start time: 2/19/2025 1:00 PM  Reason for block: at surgeon's request and post-op pain management  Staffing  Performed by: Romaine Arteaga MD  Authorized by: Romaine Arteaga MD    Preanesthetic Checklist  Completed: patient identified, IV checked, site marked, risks and benefits discussed, surgical consent, monitors and equipment checked, pre-op evaluation and timeout performed  Peripheral Block  Patient position: sitting  Prep: ChloraPrep  Patient monitoring: frequent blood pressure checks, continuous pulse oximetry and heart rate  Block type: Interscalene  Laterality: right  Injection technique: single-shot  Procedures: ultrasound guided, Ultrasound guidance required for the procedure to increase accuracy and safety of medication placement and decrease risk of complications.  Ultrasound permanent image saved  bupivacaine (PF) (MARCAINE) 0.5 % injection 20 mL - Perineural   15 mL - 2/19/2025 1:15:00 PM  bupivacaine liposomal (EXPAREL) 1.3 % injection 20 mL - Perineural   10 mL - 2/19/2025 1:15:00 PM  midazolam (VERSED) injection 0.5 mg - Intravenous   2 mg - 2/19/2025 1:10:00 PM  Needle  Needle type: Stimuplex   Needle gauge: 22 G  Needle length: 2 in  Needle localization: anatomical landmarks and ultrasound guidance  Assessment  Injection assessment: incremental injection, frequent aspiration, injected with ease, negative aspiration, negative for heart rate change, no paresthesia on injection, no symptoms of intraneural/intravenous injection and needle tip visualized at all times  Paresthesia pain: none  Post-procedure:  site cleaned  patient tolerated the procedure well with no immediate complications

## 2025-02-19 NOTE — INTERVAL H&P NOTE
H&P reviewed. After examining the patient I find no changes in the patients condition since the H&P had been written.    Vitals:    02/19/25 1243   BP: 158/94   Pulse: 67   Resp: 18   Temp:    SpO2: 99%

## 2025-02-19 NOTE — ANESTHESIA POSTPROCEDURE EVALUATION
Post-Op Assessment Note    CV Status:  Stable    Pain management: adequate       Mental Status:  Alert and awake   Hydration Status:  Euvolemic   PONV Controlled:  Controlled   Airway Patency:  Patent  Two or more mitigation strategies used for obstructive sleep apnea   Post Op Vitals Reviewed: Yes    No anethesia notable event occurred.    Staff: Anesthesiologist       Last Filed PACU Vitals:  Vitals Value Taken Time   Temp 97.4 °F (36.3 °C) 02/19/25 1600   Pulse 52 02/19/25 1604   /69 02/19/25 1600   Resp 14 02/19/25 1604   SpO2 99 % 02/19/25 1604   Vitals shown include unfiled device data.    Modified Gretta:     Vitals Value Taken Time   Activity 1 02/19/25 1600   Respiration 2 02/19/25 1600   Circulation 2 02/19/25 1600   Consciousness 2 02/19/25 1600   Oxygen Saturation 2 02/19/25 1600     Modified Gretta Score: 9

## 2025-02-19 NOTE — ANESTHESIA POSTPROCEDURE EVALUATION
Post-Op Assessment Note    CV Status:  Stable  Pain Score: 0    Pain management: adequate       Mental Status:  Awake   Hydration Status:  Euvolemic   PONV Controlled:  Controlled   Airway Patency:  Patent  Two or more mitigation strategies used for obstructive sleep apnea   Post Op Vitals Reviewed: Yes    No anethesia notable event occurred.    Staff: CRNA           Last Filed PACU Vitals:  Vitals Value Taken Time   Temp 96.8    Pulse 58    /59    Resp 16    SpO2 98%

## 2025-02-19 NOTE — DISCHARGE INSTR - AVS FIRST PAGE
[facility] Patient  Information      Home Instructions after Shoulder Arthroscopy             MD TIM RubioA  Orthopaedic Surgery   [facility]  [address], [place] [address]    The following instructions are to be used for the first week after surgery or until you return to see the physician. If you have any questions, you can contact the office at [phone]. The most important concerns in the first week are controlling the inflammation and pain.      Icing your shoulder lceMan Cooler: If you have purchased/rented an iceman cooler, you should use this as often as you can for the next 1-2 weeks. Always have a thin layer of gauze or material between your skin and the cooling unit to prevent thermal injury to your skin. If you do not have an iceman cooler, use ice bags applied around your knee for 20-30 minutes every 3-4 hours. Make sure the ice is not applied directly to the skin.    Caring for your dressing You may remove the bulky dressing 3-4 days after surgery and replace it with 1-2 layers of clean gauze over the incision. This dressing is usually wet and bloody due to  arthroscopic fluid leaking out of your shoulder.    Bathing/ Showering Do not take a shower until after your first post-operative visit. You may bathe, but make sure the dressing and wound  stays dry.    Wearing your sling Wear your sling at all times, except when doing your prescribed exercises. The purpose of the arm sling is for comfort as well  as protection.    Home Exercises Remove your sling to perform only the range of motion exercises described below. It is helpful to use the lceMan after the exercises. You may start the exercises the first day after surgery. If you are in too much pain, you may wait until your first postoperative visit to start the exercises.       POSTOPERATIVE HOME EXERCISES    You can lean over and let your arm hang down so that you can get to your armpit. See  the diagram below                You may do pendulum exercises. Pendulum exercises should be done 2 times a day for about 30 seconds in each direction. It is important to relax your shoulder during this exercise and you should be bent to 90 degrees at the waist. The rest of your body should not move at all during this exercise. See diagram below.                   Taking your medication If you have been given a prescription for narcotic pain pills (oxycodone), please use them only for pain on the schedule and in the dosage indicated.    If you have been given a prescription for Naprosyn, a non steroidal anti-inflammatory drug (NSAID), you are to begin taking this the morning of the day after surgery. This medication is designed to help reduce your need for the narcotic pain medicine and assist in making you more comfortable. You may also take Tylenol for pain. Do NOT take any additional pain related medications you may have such as Advil (Ibuprofen), or Aleve (Naproxen Sodium), etc. unless instructed to do so.    You are to begin taking Aspirin once daily starting the  the day after your surgery. This medication is designed to help reduce your risk for a blood clot (DVT).    4. DO NOT take Aspirin or Naprosyn if you are already on a blood thinner.         Follow-Up You should have already been scheduled for a follow-up  appointment to see Dr. Galindo in his office. If not, please call [phone].   Reasons for Concern             If you have progressive pain that does not respond to pain  medication, a temperature over 101.5, excessive drainage, or decreased sensation in your hand, contact the office at [phone].    Call 911 or go to the Emergency Room immediately if you experience any CHEST PAIN or SHORTNESS OF BREATH, as these symptoms can be a sign of a life threatening  condition.       Ice Therapy      Icing your shoulder The Cryotherapy unit is a device designed to keep your knee or shoulder cold after  "surgery in order to decrease pain and swelling. Because the cold device will decrease your pain, it will also decrease your need for pain medication. This helps avoid issues some people often have with pain medications, such as nausea, constipation, and urinary retention (inability to urinate).    If you have problems with the cryotherapy unit, please call the company with the phone number (depending on the brand of cryotherapy unit, it may be located on the top of the unit). If you are unable to reach the company or have other problems with the device please call the office.      Using the Cryotherapy Unit Use the cryotherapy unit as much as possible until your follow-up appointment. Try to keep the temperature of the unit at or near 45 degrees. Depending on the brand of unit, there may be a temperature is displayed by a red line on the thermostat located on the connecting tubing that extends outward from the cooler. You may be able to turn a dial towards the snowflake to decrease the cryotherapy unit temperature.    To disconnect the ice pad from the machine, you must first locate the connection between the tubing of the ice pad and where it connects to the tubing from the cooler.    To release the ice pad from the cooler, press the two silver discs located on the blue tubing connecter. Make sure your cooler is unplugged from the wall before disconnecting yourself to prevent the spilling of water.    Once you have disconnected the tubing, cap the connector ends with the 2 provided clear caps.    When reconnecting the tubing to the cooler, be sure you hear a \"click\" when snapping the two ends of the tubing together. This means that the connection between the two tubes is secure and will reduce the chance that water will leak from the system.    Once the ice has melted in the cooler, dump out the water. Fill the cooler with ice up to the blue line printed inside the cooler then fill with cold water. Then turn the " "unit back on and be sure to check the thermostat to ensure the cooler temperature remains at 45°F.      Cryot herapy Unit Tips If you cannot tolerate 45 degrees while sleeping, you may  want to increase the temperature to 50 degrees during this time.    If you cannot tolerate the temperature of the cryotherapy unit at all, then try to use icepacks of any type to keep your knee or shoulder cold. If using icepacks, it is recommended that you use them for 30 minutes as often as every 2 hours as needed for pain relief. Ice packs should not be placed directly on the skin.    If you cannot tolerate the cold temperature by either method, it is ok to stop icing, but your pain may be a slightly increased.    When doing your dressing change, be sure the cold pad is not directly on your skin as this could result in frostbite and skin damage. There should always be some layer of protection between your skin and the cold pad. This layer should not be so thick that you do not feel the cold from the iceman touching your skin. The pad should be placed with the \"bubble\" side towards your skin. If you are wearing a brace after surgery, the cold pad goes inside the brace, not on top.    It is a good idea to have an excess of ice, as you will have to refill your cooler approximately every 6 hours. Get a few large bags of ice to keep in your freezer, freeze small bottles of water, or try freezing water in Tupperware containers (ice  blocks tend to stay frozen longer).        "

## 2025-02-19 NOTE — OP NOTE
OPERATIVE REPORT  PATIENT NAME: Pavan Jimenez    :  1960  MRN: [id number]  Pt Location: [facility] OR ROOM 05    SURGERY DATE: 2025    Surgeons and Role:     * North Galindo MD - Primary     * Sasha Randhawa PA-C - Assisting      Pre-Operative Diagnosis:  1. RIGHT shoulder rotator cuff tear  2. Biceps disease  3. Anterior labral tear with dislocations  4. Subacromial impingement    Post-Operative Diagnosis:  1. RIGHT shoulder rotator cuff tear  2. Biceps disease  3. Anterior labral tear with dislocations  4. Subacromial impingement    Procedure:  1. RIGHT shoulder arthroscopic rotator cuff repair (75900)  2. Arthroscopic biceps tendon tenodesis (94435)  3. Arthroscopic anterior labral repair (62388)  4. Arthroscopic extensive debridement of glenohumeral joint (92592)  5. Arthroscopic subacromial decompression with formal acromioplasty (70002)      Anesthesia: General with regional    Complications: None.    Disposition: PACU    Estimated blood loss: Minimal      Indications for Surgery:  Pavan Jimenez is a 64 y.o.y.o.is a pleasant patient with a history of RIGHT  pain and dislocations. History, exam, and imaging were consistent with the above diagnoses.  After discussion with the patient as to the nature of the problem, the alternatives of treatment, the risks and benefits of surgery, the postoperative recovery, the patient elected to undergo the above noted procedure.      Full Description of the Operation/Procedure:  Pavan Jimenezwas seen in preop holding area. The RIGHT upper extremity was marked, and signed informed consent was taken. All risks and benefits of procedure were explained. Patient was brought to the operating, placed supine on the operating table in the beach chair position. Anesthesia was inducted.    Exam under anesthesia demonstrated shoulder motion was supple.    The operative shoulder was prepped and draped in a sterile manner. A surgical timeout was performed.    A  standard posterior portal was made and the arthroscope was introduced into the shoulder. An anterior portal was made under direct visualization.    Evaluation of RIGHT shoulder demonstrated: Humeral cartilage: normal    Glenoid cartilage: normal  Biceps anchor/superior labrum: degenerative tearing, type 1 SLAP tear  Anterior labrum: tear from 1 o'clock to 5:30 position with completed detached labrum  Inferior labrum: tear extending to 5:30   Posterior labrum: mild fraying  Subscapularis:normal   MGHL:normal  Biceps tendon: extensive tenosynovitis with degenerative tearing near biceps anchor site   Rotator cuff: Full thickness tear of the supraspinatus and infraspinatus    I then inserted the arthroscope into the subacromial space from the posterior portal. A lateral portal was made under direct visualization.    ARTHROSCOPIC EXTENSIVE DEBRIDEMENT (23608):  Visualizing from posteriorly and instrumenting from directly laterally, I used a radiofrequency device to peel off and remove the stump of tendon tissue on the greater tuberosity. Loose fragments of tendon tissue were similarly debrided. The greater tuberosity was debrided with a motorized shaver to create a surface receptive to healing. The anterior interval was also debrided as was part of the posterior labrum and superior labrum which was degenerative. I also debrided the bicipital groove and synovitis around the biceps tendon sheath in the groove.    ARTHROSCOPIC SUBACROMIAL DECOMPRESSION WITH FORMAL ACROMIOPLASTY (09478):  Visualizing from posteriorly and instrumenting from directly laterally, the bursa was removed from lateral to medial, anterior to posterior. A hook shaped spur under the acromion was evident and it was necessary to remove this not only to protect the rotator cuff repair, but also to allow for adequate access with the portals necessary for the repair. Thus, I used a motorized shaver to flatten the anterior acromion by debriding the bone of  the anterior acromion undersurface, making it uniformly flat with the remainder of the acromion.      ARTHROSCOPIC BICEPS TENODESIS (49966):  The biceps was retracted medially with a probe in the anterior portal. A spinal needle was used to localize the anterolateral accessory portal. After creation of this portal, a portion of the interval was released and the bicipital groove was debrided above the transverse ligament. A scorpion suture passing device was use to place two #2 Fiberlink sutures through the biceps tendon at the level of the planned tenodesis. These were passed in a locked loop fashion creating secure fixation. The suture limbs were retrieved out the anterolateral portal through a 7 mm cannula. These suture limbs were then placed into a swivel lock anchor which was placed into the bicipital groove. The tension of the tendon was anatomic. The biceps tendon was then released from the superior labrum. The suture limbs were cut. The tendon stump was debrided. There was excellent fixation in the bicipital groove.      Anterior Labral Repair (90684)  The anterior labrum was debrided using a rasp. A percutaneous portal was made through the subscapularis which was localized with a spinal needle. The Fibertak sleeve was introduced into the joint and placed onto the anteroinferior edge of the glenoid. This was drilled and the anchor was placed. The anchor felt secure with excellent fixation. A suture passer was placed beneath the labrum at the level of the anchor and retrieved out of the anterior portal. Using the knotless mechanism, the suture was passed around the labrum and tightly secured, bringing the labrum onto the glenoid. This process was repeated with 3 more anchors.The labrum appeared well fixed and was stable to probing.       ARTHROSCOPIC ROTATOR CUFF REPAIR (71301):  I debrided the edges of the rotator cuff tear and clearly visualized the extent of the tear. The first anchor was then placed at the  anteromedial edge of the greater tuberosity footprint and I used a scorpion suture passing device to place the anchor sutures through the supraspinatus such that I traversed both the deep delaminated layer and the superficial layer. These sutures were then clamped at the anterior portal and placed aside. The next anchor was placed more at the posteromedial edge of the greater tuberosity footprint and engaging the tension in the tendon, it was evident that a double row repair was beneficial. These sutures were passed through the posterior aspect of the torn rotator cuff and were retrieved through the anterior portal. A third medial row anchor was placed and these sutures were passed through the cuff as well. One suture from each limb was retrieved through the lateral portal and placed into a lateral swivel lock anchor posteriorly. The second limb from each of the medial anchors was retrieved then placed into a second lateral row anchor placed slightly more anteriorly. There was good tension on the cuff and the repair was compressed against the footprint with good fixation.    The portal incisions were closed with 3-0 Monocryl suture and reinforced with Steri-Strips. The shoulder was dressed sterilely, placed in a shoulder immobilizer and the patient transferred to the recovery room in satisfactory condition having tolerated the procedure well.    A physician assistant was required during the procedure for camera positioning, suture management, anchor placement, and suturing. There was no qualified resident available to assist so Sasha Fuentes PA-C served as the first assistant.                  SIGNATURE: North Galindo MD  DATE: February 19, 2025  TIME: 3:04 PM

## 2025-02-19 NOTE — ANESTHESIA PREPROCEDURE EVALUATION
Procedure:  REPAIR ROTATOR CUFF  ARTHROSCOPIC (Right: Shoulder)  ARTHROSCOPIC BICEPS TENODESIS (Right: Shoulder)  DISTAL CLAVICLE EXCISION, ARTHROSCOPIC (Right: Shoulder)  REPAIR LABRUM (Right: Shoulder)    H/o umbilical hernia repair about 40 years ago with no anesthesia complications     Relevant Problems   CARDIO   (+) White coat syndrome without diagnosis of hypertension      MUSCULOSKELETAL   (+) Piriformis syndrome of left side      NEURO/PSYCH   (+) Chronic pain syndrome        Physical Exam    Airway    Mallampati score: III  TM Distance: >3 FB  Neck ROM: full     Dental   No notable dental hx     Cardiovascular  Cardiovascular exam normal    Pulmonary  Pulmonary exam normal     Other Findings    Anesthesia Plan  ASA Score- 2     Anesthesia Type- general with ASA Monitors.         Additional Monitors:     Airway Plan: ETT.           Plan Factors-Exercise tolerance (METS): >4 METS.    Chart reviewed. EKG reviewed.  Existing labs reviewed. Patient summary reviewed.    Patient is not a current smoker.      Obstructive sleep apnea risk education given perioperatively.        Induction- intravenous.    Postoperative Plan- Plan for postoperative opioid use.         Informed Consent- Anesthetic plan and risks discussed with patient.  I personally reviewed this patient with the CRNA. Discussed and agreed on the Anesthesia Plan with the CRNA..    NPO Status:  Vitals Value Taken Time   Date of last liquid 02/19/25 02/19/25 1210   Time of last liquid 0800 02/19/25 1210   Date of last solid 02/18/25 02/19/25 1210   Time of last solid 2130 02/19/25 1210

## 2025-02-27 ENCOUNTER — OFFICE VISIT (OUTPATIENT)
Age: 65
End: 2025-02-27

## 2025-02-27 ENCOUNTER — EVALUATION (OUTPATIENT)
Dept: PHYSICAL THERAPY | Facility: CLINIC | Age: 65
End: 2025-02-27
Payer: COMMERCIAL

## 2025-02-27 DIAGNOSIS — S46.011A TRAUMATIC COMPLETE TEAR OF RIGHT ROTATOR CUFF, INITIAL ENCOUNTER: Primary | ICD-10-CM

## 2025-02-27 DIAGNOSIS — S46.011A TRAUMATIC COMPLETE TEAR OF RIGHT ROTATOR CUFF, INITIAL ENCOUNTER: ICD-10-CM

## 2025-02-27 PROCEDURE — 99024 POSTOP FOLLOW-UP VISIT: CPT | Performed by: ORTHOPAEDIC SURGERY

## 2025-02-27 PROCEDURE — 97161 PT EVAL LOW COMPLEX 20 MIN: CPT

## 2025-02-27 PROCEDURE — 97140 MANUAL THERAPY 1/> REGIONS: CPT

## 2025-02-27 PROCEDURE — 97112 NEUROMUSCULAR REEDUCATION: CPT

## 2025-02-27 NOTE — LETTER
2025    Sasha Randhawa PA-C  [address]  [address]  [facility] [address]    Patient: Pavan Jimenez   YOB: 1960   Date of Visit: 2025     Encounter Diagnosis     ICD-10-CM    1. Traumatic complete tear of right rotator cuff, initial encounter  S46.011A Ambulatory referral to Physical Therapy          Dear Dr. Randhawa:    Thank you for your recent referral of Pavan Jimenez. Please review the attached evaluation summary from Pavan's recent visit.     Please verify that you agree with the plan of care by signing the attached order.     If you have any questions or concerns, please do not hesitate to call.     I sincerely appreciate the opportunity to share in the care of one of your patients and hope to have another opportunity to work with you in the near future.       Sincerely,    Liliana Razo, PT      Referring Provider:      I certify that I have read the below Plan of Care and certify the need for these services furnished under this plan of treatment while under my care.                    Sasha Randhawa PA-C  [address]  [address]  [facility] [address]  Via In Basket          PT Evaluation     Today's date: 2025  Patient name: Pavan Jimenez  : 1960  MRN: [id number]  Referring provider: Sasha Randhawa PA-C  Dx:   Encounter Diagnosis     ICD-10-CM    1. Traumatic complete tear of right rotator cuff, initial encounter  S46.011A Ambulatory referral to Physical Therapy                     Assessment  Impairments: abnormal or restricted ROM, activity intolerance, impaired physical strength, lacks appropriate home exercise program, pain with function, unable to perform ADL, participation limitations, activity limitations and endurance  Symptom irritability: moderate    Assessment details: Pt is a 64 yom presenting for initial eval of RIGHT shoulder pain s/p RC repair, labral repair, and bicep tenodesis on 2025 by Dr. Galindo. Pt has  restrictions in both AROM and PROM, strength, NM control, scapular control, and global shoulder stability d/t post op protocol and recent procedure. This surgery recovery impacts ability to do all ALD's, lift, write, type, drive, sleep, carry objects, reach OH, dress, wash, clean, or do any activity with his RIGHT UE. Skilled PT is required at this time to safely progress through post op procotol and restore function in order to reach all goals related to this POC.   Understanding of Dx/Px/POC: good     Prognosis: good    Goals  STG's:  1. In 4 weeks, pt will have 90d of flexion, 35d of ER, and 45d abd.  2 In 4 weeks, pt will be sleeping 3-4 hrs w/out interruption by pain.  3. In 4 weeks, pt will tolerate walking 30 mins in sling w/out sh pain.  4. In 6 weeks, pt will tolerate being out of sling for duration of the day.     LTG's:  1. In 12 weeks, pt will restore full AROM YADIEL.  2. In 12 weeks, pt will demo 4-/5 MMT.  3. In 16 weeks, pt will demo 4/5 MMT.  4. In 16 weeks, pt will tolerate lifting 15# at their side.  5. In 20 weeks, pt will tolerate lifting 25# at side and at least 5# OH.      Plan  Patient would benefit from: skilled physical therapy    Planned therapy interventions: joint mobilization, manual therapy, neuromuscular re-education, patient/caregiver education, self care, strengthening, stretching, therapeutic activities, therapeutic exercise, home exercise program, graded exercise, graded activity, functional ROM exercises, flexibility, coordination, body mechanics training and activity modification    Frequency: 2x week  Duration in weeks: 20  Plan of Care beginning date: 2/27/2025  Plan of Care expiration date: 7/17/2025  Treatment plan discussed with: patient        Subjective Evaluation    History of Present Illness  Date of surgery: 2/19/2025  Mechanism of injury: surgery  Mechanism of injury: Pt is a 64 yom presenting for initial eval of RIGHT shoulder s/p RC repair, labral repair, and bicep  tenodesis on 24. He injured the shoulder about 4 weeks ago while ice skating and falling into the boards. The shoulder dislocated and then cont to dislocate 4 more times before surgery when he would move the wrong way. Pain has been well managed since surgery and he is no longer taking Tylenol as needed. He ices daily and has been sleeping in a slightly reclined position. He has complied with sling. Had first post op f/u this morning and they were happy with how everything looked so far.               Not a recurrent problem   Quality of life: good    Patient Goals  Patient goals for therapy: decreased pain, increased motion, increased strength, independence with ADLs/IADLs and return to sport/leisure activities  Patient goal: Wants to return to all his activities such as lifting at the gym, running, elliptical, biking, pickleball, etc.  Pain  Current pain ratin  At best pain ratin  At worst pain ratin  Quality: dull ache, throbbing, tight, pulling and discomfort  Relieving factors: ice, rest and support  Aggravating factors: sitting, standing, walking, overhead activity, keyboarding and lifting (sleeping, driving)  Progression: no change          Objective     Observations     Right Shoulder  Positive for effusion and incision.     Tenderness     Additional Tenderness Details  Global shoulder tenderness d/t surgical procedure.     Cervical/Thoracic Screen   Cervical range of motion within normal limits    Active Range of Motion   Left Shoulder   Normal active range of motion    Additional Active Range of Motion Details  No AROM performed on the R d/t post op protocol.    Passive Range of Motion   Left Shoulder   Normal passive range of motion    Additional Passive Range of Motion Details  No measurement of PROM on the R taken today d/t post op protocol. Gentle PROM initiated under precautions of 90d FF, 45d ER, and 45d abd.    Strength/Myotome Testing     Left Shoulder   Normal muscle  strength    Additional Strength Details  Not assessed on the R d/t post op precautions.              Precautions: N/a  Dx: RIGHT RC repair, labral repair, bicep tenodesis 2/19/25  *PROTOCOL ATTACHED TO CHART    Manuals 2/27            Shoulder PROM (w/in protocol precuations) 10 mins                                                   Neuro Re-Ed             Scap retractoion HEP            Pendulums HEP            UT stretch HEP                                                                Ther Ex             Gripping HEP                                                                                                       Ther Activity                                       Gait Training                                       Modalities

## 2025-02-27 NOTE — PROGRESS NOTES
PT Evaluation     Today's date: 2025  Patient name: Pavan Jimenez  : 1960  MRN: [id number]  Referring provider: Sasha Randhawa PA-C  Dx:   Encounter Diagnosis     ICD-10-CM    1. Traumatic complete tear of right rotator cuff, initial encounter  S46.011A Ambulatory referral to Physical Therapy                     Assessment  Impairments: abnormal or restricted ROM, activity intolerance, impaired physical strength, lacks appropriate home exercise program, pain with function, unable to perform ADL, participation limitations, activity limitations and endurance  Symptom irritability: moderate    Assessment details: Pt is a 64 yom presenting for initial eval of RIGHT shoulder pain s/p RC repair, labral repair, and bicep tenodesis on 2025 by Dr. Galindo. Pt has restrictions in both AROM and PROM, strength, NM control, scapular control, and global shoulder stability d/t post op protocol and recent procedure. This surgery recovery impacts ability to do all ALD's, lift, write, type, drive, sleep, carry objects, reach OH, dress, wash, clean, or do any activity with his RIGHT UE. Skilled PT is required at this time to safely progress through post op procotol and restore function in order to reach all goals related to this POC.   Understanding of Dx/Px/POC: good     Prognosis: good    Goals  STG's:  1. In 4 weeks, pt will have 90d of flexion, 35d of ER, and 45d abd.  2 In 4 weeks, pt will be sleeping 3-4 hrs w/out interruption by pain.  3. In 4 weeks, pt will tolerate walking 30 mins in sling w/out sh pain.  4. In 6 weeks, pt will tolerate being out of sling for duration of the day.     LTG's:  1. In 12 weeks, pt will restore full AROM YADIEL.  2. In 12 weeks, pt will demo 4-/5 MMT.  3. In 16 weeks, pt will demo 4/5 MMT.  4. In 16 weeks, pt will tolerate lifting 15# at their side.  5. In 20 weeks, pt will tolerate lifting 25# at side and at least 5# OH.      Plan  Patient would benefit from: skilled physical  therapy    Planned therapy interventions: joint mobilization, manual therapy, neuromuscular re-education, patient/caregiver education, self care, strengthening, stretching, therapeutic activities, therapeutic exercise, home exercise program, graded exercise, graded activity, functional ROM exercises, flexibility, coordination, body mechanics training and activity modification    Frequency: 2x week  Duration in weeks: 20  Plan of Care beginning date: 2025  Plan of Care expiration date: 2025  Treatment plan discussed with: patient        Subjective Evaluation    History of Present Illness  Date of surgery: 2025  Mechanism of injury: surgery  Mechanism of injury: Pt is a 64 yom presenting for initial eval of RIGHT shoulder s/p RC repair, labral repair, and bicep tenodesis on 24. He injured the shoulder about 4 weeks ago while ice skating and falling into the boards. The shoulder dislocated and then cont to dislocate 4 more times before surgery when he would move the wrong way. Pain has been well managed since surgery and he is no longer taking Tylenol as needed. He ices daily and has been sleeping in a slightly reclined position. He has complied with sling. Had first post op f/u this morning and they were happy with how everything looked so far.               Not a recurrent problem   Quality of life: good    Patient Goals  Patient goals for therapy: decreased pain, increased motion, increased strength, independence with ADLs/IADLs and return to sport/leisure activities  Patient goal: Wants to return to all his activities such as lifting at the gym, running, elliptical, biking, pickleball, etc.  Pain  Current pain ratin  At best pain ratin  At worst pain ratin  Quality: dull ache, throbbing, tight, pulling and discomfort  Relieving factors: ice, rest and support  Aggravating factors: sitting, standing, walking, overhead activity, keyboarding and lifting (sleeping, driving)  Progression:  no change          Objective     Observations     Right Shoulder  Positive for effusion and incision.     Tenderness     Additional Tenderness Details  Global shoulder tenderness d/t surgical procedure.     Cervical/Thoracic Screen   Cervical range of motion within normal limits    Active Range of Motion   Left Shoulder   Normal active range of motion    Additional Active Range of Motion Details  No AROM performed on the R d/t post op protocol.    Passive Range of Motion   Left Shoulder   Normal passive range of motion    Additional Passive Range of Motion Details  No measurement of PROM on the R taken today d/t post op protocol. Gentle PROM initiated under precautions of 90d FF, 45d ER, and 45d abd.    Strength/Myotome Testing     Left Shoulder   Normal muscle strength    Additional Strength Details  Not assessed on the R d/t post op precautions.              Precautions: N/a  Dx: RIGHT RC repair, labral repair, bicep tenodesis 2/19/25  *PROTOCOL ATTACHED TO CHART    Manuals 2/27            Shoulder PROM (w/in protocol precuations) 10 mins                                                   Neuro Re-Ed             Scap retractoion HEP            Pendulums HEP            UT stretch HEP                                                                Ther Ex             Gripping HEP                                                                                                       Ther Activity                                       Gait Training                                       Modalities

## 2025-02-27 NOTE — PROGRESS NOTES
S/P surgery on 2/19/25  1. RIGHT shoulder arthroscopic rotator cuff repair (06434)  2. Arthroscopic biceps tendon tenodesis (95708)  3. Arthroscopic anterior labral repair (50933)  4. Arthroscopic extensive debridement of glenohumeral joint (97281)  5. Arthroscopic subacromial decompression with formal acromioplasty (00634)    S:  Doing well, pain controlled. Using sling.     Exam:  Incision c/d/i   SILT ax/r/m/u  Motor intact ax/r/m/u   Hand wwp          AIP:  S/P surgery on 2/19/25  1. RIGHT shoulder arthroscopic rotator cuff repair (74804)  2. Arthroscopic biceps tendon tenodesis (62996)  3. Arthroscopic anterior labral repair (90112)  4. Arthroscopic extensive debridement of glenohumeral joint (54316)  5. Arthroscopic subacromial decompression with formal acromioplasty (46726)    Will be starting PT   Script provided  Discussed sling use  Follow up 6 weeks

## 2025-03-04 ENCOUNTER — OFFICE VISIT (OUTPATIENT)
Dept: PHYSICAL THERAPY | Facility: CLINIC | Age: 65
End: 2025-03-04
Payer: COMMERCIAL

## 2025-03-04 DIAGNOSIS — S46.011A TRAUMATIC COMPLETE TEAR OF RIGHT ROTATOR CUFF, INITIAL ENCOUNTER: Primary | ICD-10-CM

## 2025-03-04 PROCEDURE — 97112 NEUROMUSCULAR REEDUCATION: CPT

## 2025-03-04 PROCEDURE — 97140 MANUAL THERAPY 1/> REGIONS: CPT

## 2025-03-04 NOTE — PROGRESS NOTES
"Daily Note     Today's date: 3/4/2025  Patient name: Pavan Jimenez  : 1960  MRN: [id number]  Referring provider: Sasha Randhawa PA-C  Dx:   Encounter Diagnosis     ICD-10-CM    1. Traumatic complete tear of right rotator cuff, initial encounter  S46.011A                      Subjective: Pt states that his shoulder is doing the same. He is still sleeping inclined, following sling wear and being cautious when changing clothes and sling is off briefly. Has not had a lot of achy pain when in resting position in the sling.       Objective: See treatment diary below      Assessment: Tolerated treatment well. Patient  req cueing and sustained time in PROM positions to ensure full relaxation of shoulder musculature before stretching/moving w/in precautions.       Plan: Continue per plan of care.      Precautions: N/a  Dx: RIGHT RC repair, labral repair, bicep tenodesis 25  *PROTOCOL ATTACHED TO CHART    Manuals 2/27 3/4           Shoulder PROM (w/in protocol precuations) 10 mins 15 mins           Elbow PROM  2 mins                                     Neuro Re-Ed             Scap retraction HEP 20x5\"           Pendulums HEP 1x15 I, T, circles           UT stretch HEP 3x15\" ea           C/s retraction  1x15                                                  Ther Ex             Gripping HEP R digi  1x20                                                                                                      Ther Activity                                       Gait Training                                       Modalities                                            "

## 2025-03-07 ENCOUNTER — OFFICE VISIT (OUTPATIENT)
Dept: PHYSICAL THERAPY | Facility: CLINIC | Age: 65
End: 2025-03-07
Payer: COMMERCIAL

## 2025-03-07 DIAGNOSIS — S46.011A TRAUMATIC COMPLETE TEAR OF RIGHT ROTATOR CUFF, INITIAL ENCOUNTER: Primary | ICD-10-CM

## 2025-03-07 PROCEDURE — 97140 MANUAL THERAPY 1/> REGIONS: CPT

## 2025-03-07 PROCEDURE — 97112 NEUROMUSCULAR REEDUCATION: CPT

## 2025-03-07 NOTE — PROGRESS NOTES
"Daily Note     Today's date: 3/7/2025  Patient name: Pavan Jimenez  : 1960  MRN: [id number]  Referring provider: Sasha Randhawa PA-C  Dx:   Encounter Diagnosis     ICD-10-CM    1. Traumatic complete tear of right rotator cuff, initial encounter  S46.011A                      Subjective: No change since last visit.      Objective: See treatment diary below      Assessment: Tolerated treatment well. Patient  req cueing w/ PROM to decrease muscle guarding. Scapular control w/ retraction positions has improved.      Plan: Continue per plan of care.      Precautions: N/a  Dx: RIGHT RC repair, labral repair, bicep tenodesis 25  *PROTOCOL ATTACHED TO CHART    Manuals 2/27 3/4 3/7          Shoulder PROM (w/in protocol precuations) 10 mins 15 mins 12 mins          Elbow PROM  2 mins 2 mins                                    Neuro Re-Ed             Scap retraction HEP 20x5\" 20x5\"          Elbow AAROM   2x10          Pendulums HEP 1x15 I, T, circles 1x15 I,T, circles          UT stretch HEP 3x15\" ea 3x15\" ea          C/s retraction  1x15 1x15                                                 Ther Ex             Gripping HEP R digi  1x20 R digi  1x20                                                                                                     Ther Activity                                       Gait Training                                       Modalities                                              "

## 2025-03-11 ENCOUNTER — OFFICE VISIT (OUTPATIENT)
Dept: PHYSICAL THERAPY | Facility: CLINIC | Age: 65
End: 2025-03-11
Payer: COMMERCIAL

## 2025-03-11 DIAGNOSIS — S46.011A TRAUMATIC COMPLETE TEAR OF RIGHT ROTATOR CUFF, INITIAL ENCOUNTER: Primary | ICD-10-CM

## 2025-03-11 PROCEDURE — 97112 NEUROMUSCULAR REEDUCATION: CPT

## 2025-03-11 PROCEDURE — 97110 THERAPEUTIC EXERCISES: CPT

## 2025-03-11 NOTE — PROGRESS NOTES
"Daily Note     Today's date: 3/11/2025  Patient name: Pavan Jimenez  : 1960  MRN: [id number]  Referring provider: Sasha Randhawa PA-C  Dx:   Encounter Diagnosis     ICD-10-CM    1. Traumatic complete tear of right rotator cuff, initial encounter  S46.011A                      Subjective: Pt states that his shoulder is doing the same. No new complaints or issues since he was in last.       Objective: See treatment diary below      Assessment: Tolerated treatment well. Patient  req cueing to properly retract his scapula and dec compensations with upward elevation and UT.      Plan: Continue per plan of care.      Precautions: N/a  Dx: RIGHT RC repair, labral repair, bicep tenodesis 25  *PROTOCOL ATTACHED TO CHART    Manuals 2/27 3/4 3/7 3/11         Shoulder PROM (w/in protocol precuations) 10 mins 15 mins 12 mins 12 mins         Elbow PROM  2 mins 2 mins 2 mins                                   Neuro Re-Ed             Scap retraction HEP 20x5\" 20x5\" 20x5\" in sitting and supine         Elbow AAROM   2x10          Pendulums HEP 1x15 I, T, circles 1x15 I,T, circles 1x15 I, T, circles         UT stretch HEP 3x15\" ea 3x15\" ea 3x15\" ea         C/s retraction  1x15 1x15                                                 Ther Ex             Gripping HEP R digi  1x20 R digi  1x20 B digi 1x20         Wrist ext (shoulder supported by wedge)    1# 2x10         Wrist flex (shoulder supported by wedge)    1# 1x10                                                                          Ther Activity                                       Gait Training                                       Modalities                                                "

## 2025-03-14 ENCOUNTER — OFFICE VISIT (OUTPATIENT)
Dept: PHYSICAL THERAPY | Facility: CLINIC | Age: 65
End: 2025-03-14
Payer: COMMERCIAL

## 2025-03-14 DIAGNOSIS — S46.011A TRAUMATIC COMPLETE TEAR OF RIGHT ROTATOR CUFF, INITIAL ENCOUNTER: Primary | ICD-10-CM

## 2025-03-14 PROCEDURE — 97112 NEUROMUSCULAR REEDUCATION: CPT

## 2025-03-14 PROCEDURE — 97140 MANUAL THERAPY 1/> REGIONS: CPT

## 2025-03-14 NOTE — PROGRESS NOTES
"Daily Note     Today's date: 3/14/2025  Patient name: Pavan Jimenez  : 1960  MRN: [id number]  Referring provider: Sasha Randhawa PA-C  Dx:   Encounter Diagnosis     ICD-10-CM    1. Traumatic complete tear of right rotator cuff, initial encounter  S46.011A                      Subjective: No new complaints and the shoulder is doing about the same.       Objective: See treatment diary below      Assessment: Tolerated treatment well. Patient  cont to show appropriate progress related to progressions allowed w/in protocol. On track to progress through next phases once allowed to work into larger ranges of motion and begin loading shoulder structures.       Plan: Continue per plan of care.      Precautions: N/a  Dx: RIGHT RC repair, labral repair, bicep tenodesis 25  *PROTOCOL ATTACHED TO CHART    Manuals 2/27 3/4 3/7 3/11 3/14        Shoulder PROM (w/in protocol precuations) 10 mins 15 mins 12 mins 12 mins 12 mins        Elbow PROM  2 mins 2 mins 2 mins 2 mins                                  Neuro Re-Ed             Scap retraction HEP 20x5\" 20x5\" 20x5\" in sitting and supine 30x5\"         Elbow AAROM   2x10  3x10        Pendulums HEP 1x15 I, T, circles 1x15 I,T, circles 1x15 I, T, circles 2x15 I,T, circles        UT stretch HEP 3x15\" ea 3x15\" ea 3x15\" ea         C/s retraction  1x15 1x15  2x10                                               Ther Ex             Gripping HEP R digi  1x20 R digi  1x20 B digi 1x20         Wrist ext (shoulder supported by wedge)    1# 2x10  1#  3x10       Wrist flex (shoulder supported by wedge)    1# 1x10  1# 2x10                                                                        Ther Activity                                       Gait Training                                       Modalities                                                  "

## 2025-03-18 ENCOUNTER — OFFICE VISIT (OUTPATIENT)
Dept: PHYSICAL THERAPY | Facility: CLINIC | Age: 65
End: 2025-03-18
Payer: COMMERCIAL

## 2025-03-18 DIAGNOSIS — S46.011A TRAUMATIC COMPLETE TEAR OF RIGHT ROTATOR CUFF, INITIAL ENCOUNTER: Primary | ICD-10-CM

## 2025-03-18 PROCEDURE — 97140 MANUAL THERAPY 1/> REGIONS: CPT

## 2025-03-18 PROCEDURE — 97112 NEUROMUSCULAR REEDUCATION: CPT

## 2025-03-18 NOTE — PROGRESS NOTES
"Daily Note     Today's date: 3/18/2025  Patient name: Pavan Jimenez  : 1960  MRN: [id number]  Referring provider: Sasha Randahwa PA-C  Dx:   Encounter Diagnosis     ICD-10-CM    1. Traumatic complete tear of right rotator cuff, initial encounter  S46.011A                      Subjective: No change in symptoms at the shoulder. Has maintained sling compliance and HEP is going well.       Objective: See treatment diary below      Assessment: Tolerated treatment well. Patient  tolerated PROM better today w/ less instances of muscle guarding. Will cont to progress as tolerated w/in precautions outlined in the protocol.      Plan: Continue per plan of care.      Precautions: N/a  Dx: RIGHT RC repair, labral repair, bicep tenodesis 25  *PROTOCOL ATTACHED TO CHART    Manuals 2/27 3/4 3/7 3/11 3/14 3/18       Shoulder PROM (w/in protocol precuations) 10 mins 15 mins 12 mins 12 mins 12 mins 15 mins       Elbow PROM  2 mins 2 mins 2 mins 2 mins                                  Neuro Re-Ed             Scap retraction HEP 20x5\" 20x5\" 20x5\" in sitting and supine 30x5\"  30x5\"       Elbow AAROM   2x10  3x10 3x10       Pendulums HEP 1x15 I, T, circles 1x15 I,T, circles 1x15 I, T, circles 2x15 I,T, circles 2x15 I, T, circles       UT stretch HEP 3x15\" ea 3x15\" ea 3x15\" ea         C/s retraction  1x15 1x15  2x10 2x10                                              Ther Ex             Gripping HEP R digi  1x20 R digi  1x20 B digi 1x20  B digi 1x30       Wrist ext (shoulder supported by wedge)    1# 2x10  2#  2x10       Wrist flex (shoulder supported by wedge)    1# 1x10  1# 2x10                                                                        Ther Activity                                       Gait Training                                       Modalities                                                    "

## 2025-03-21 ENCOUNTER — OFFICE VISIT (OUTPATIENT)
Dept: PHYSICAL THERAPY | Facility: CLINIC | Age: 65
End: 2025-03-21
Payer: COMMERCIAL

## 2025-03-21 DIAGNOSIS — S46.011A TRAUMATIC COMPLETE TEAR OF RIGHT ROTATOR CUFF, INITIAL ENCOUNTER: Primary | ICD-10-CM

## 2025-03-21 PROCEDURE — 97112 NEUROMUSCULAR REEDUCATION: CPT

## 2025-03-21 PROCEDURE — 97140 MANUAL THERAPY 1/> REGIONS: CPT

## 2025-03-21 NOTE — PROGRESS NOTES
"Daily Note     Today's date: 3/21/2025  Patient name: Pavan Jimenez  : 1960  MRN: [id number]  Referring provider: Sasha Randhawa PA-C  Dx:   Encounter Diagnosis     ICD-10-CM    1. Traumatic complete tear of right rotator cuff, initial encounter  S46.011A                      Subjective: No new complaints, shoulder is doing the same.       Objective: See treatment diary below      Assessment: Tolerated treatment well. Patient  req minimal cueing for scapular retraction to improve NM control.      Plan: Continue per plan of care.      Precautions: N/a  Dx: RIGHT RC repair, labral repair, bicep tenodesis 25  *PROTOCOL ATTACHED TO CHART    Manuals 2/27 3/4 3/7 3/11 3/14 3/18 3/21      Shoulder PROM (w/in protocol precuations) 10 mins 15 mins 12 mins 12 mins 12 mins 15 mins 15 mins      Elbow PROM  2 mins 2 mins 2 mins 2 mins                                  Neuro Re-Ed             Scap retraction HEP 20x5\" 20x5\" 20x5\" in sitting and supine 30x5\"  30x5\" 1x20 in sitting    1x20 in supine    1x20 in S/L      Elbow AAROM   2x10  3x10 3x10 3x10      Pendulums HEP 1x15 I, T, circles 1x15 I,T, circles 1x15 I, T, circles 2x15 I,T, circles 2x15 I, T, circles 2x15 I,T, circles      UT stretch HEP 3x15\" ea 3x15\" ea 3x15\" ea         C/s retraction  1x15 1x15  2x10 2x10                                              Ther Ex             Gripping HEP R digi  1x20 R digi  1x20 B digi 1x20  B digi 1x30 B digi 1x30      Wrist ext (shoulder supported by wedge)    1# 2x10  2#  2x10 2# 3x10      Wrist flex (shoulder supported by wedge)    1# 1x10  1# 2x10 2# 2x10                                                                       Ther Activity                                       Gait Training                                       Modalities                                                      "

## 2025-03-25 ENCOUNTER — OFFICE VISIT (OUTPATIENT)
Dept: PHYSICAL THERAPY | Facility: CLINIC | Age: 65
End: 2025-03-25
Payer: COMMERCIAL

## 2025-03-25 DIAGNOSIS — S46.011A TRAUMATIC COMPLETE TEAR OF RIGHT ROTATOR CUFF, INITIAL ENCOUNTER: Primary | ICD-10-CM

## 2025-03-25 PROCEDURE — 97112 NEUROMUSCULAR REEDUCATION: CPT

## 2025-03-25 PROCEDURE — 97140 MANUAL THERAPY 1/> REGIONS: CPT

## 2025-03-25 PROCEDURE — 97535 SELF CARE MNGMENT TRAINING: CPT

## 2025-03-25 NOTE — PROGRESS NOTES
"Daily Note     Today's date: 3/25/2025  Patient name: Pavan Jimenez  : 1960  MRN: [id number]  Referring provider: Sasha Randhawa PA-C  Dx:   Encounter Diagnosis     ICD-10-CM    1. Traumatic complete tear of right rotator cuff, initial encounter  S46.011A                      Subjective: No change at the shoulder. Pt states he is looking forward to being cleared form the sling.       Objective: See treatment diary below      Assessment: Tolerated treatment well. Patient  cont to have the most apprehension and guarding when working on ER PROM. Gradually improves with inc duration of stretching and using light distraction to relax.      Plan: Continue per plan of care.      Precautions: N/a  Dx: RIGHT RC repair, labral repair, bicep tenodesis 25  *PROTOCOL ATTACHED TO CHART    Manuals 2/27 3/4 3/7 3/11 3/14 3/18 3/21 3/25     Shoulder PROM (w/in protocol precuations) 10 mins 15 mins 12 mins 12 mins 12 mins 15 mins 15 mins 15 mins     Elbow PROM  2 mins 2 mins 2 mins 2 mins                                  Neuro Re-Ed             Scap retraction HEP 20x5\" 20x5\" 20x5\" in sitting and supine 30x5\"  30x5\" 1x20 in sitting    1x20 in supine    1x20 in S/L 1x20 in sitting    1x20 in supine    1x20 in S/L     Elbow AAROM   2x10  3x10 3x10 3x10 3x10      Pendulums HEP 1x15 I, T, circles 1x15 I,T, circles 1x15 I, T, circles 2x15 I,T, circles 2x15 I, T, circles 2x15 I,T, circles 2x15 I, T, circles     UT stretch HEP 3x15\" ea 3x15\" ea 3x15\" ea         C/s retraction  1x15 1x15  2x10 2x10  2x10                                            Ther Ex             Gripping HEP R digi  1x20 R digi  1x20 B digi 1x20  B digi 1x30 B digi 1x30      Wrist ext (shoulder supported by wedge)    1# 2x10  2#  2x10 2# 3x10 3# 2x10     Wrist flex (shoulder supported by wedge)    1# 1x10  1# 2x10 2# 2x10 3# 2x10                                                                       Ther Activity                                     "   Gait Training                                       Modalities

## 2025-03-28 ENCOUNTER — OFFICE VISIT (OUTPATIENT)
Dept: PHYSICAL THERAPY | Facility: CLINIC | Age: 65
End: 2025-03-28
Payer: COMMERCIAL

## 2025-03-28 DIAGNOSIS — S46.011A TRAUMATIC COMPLETE TEAR OF RIGHT ROTATOR CUFF, INITIAL ENCOUNTER: Primary | ICD-10-CM

## 2025-03-28 PROCEDURE — 97140 MANUAL THERAPY 1/> REGIONS: CPT

## 2025-03-28 PROCEDURE — 97110 THERAPEUTIC EXERCISES: CPT

## 2025-03-28 NOTE — PROGRESS NOTES
"Daily Note     Today's date: 3/28/2025  Patient name: Pavan Jimenez  : 1960  MRN: [id number]  Referring provider: Sasha Randhawa PA-C  Dx:   Encounter Diagnosis     ICD-10-CM    1. Traumatic complete tear of right rotator cuff, initial encounter  S46.011A                      Subjective: Pain free upon arrival. Able to D/C sling next week as per discussion between primary PT and surgeon.       Objective: See treatment diary below      Assessment: Tolerated treatment well. Less guarding with passive ER today.     Plan: Continue per plan of care.      Precautions: N/a  Dx: RIGHT RC repair, labral repair, bicep tenodesis 25  *PROTOCOL ATTACHED TO CHART    Manuals 2/27 3/4 3/7 3/11 3/14 3/18 3/21 3/25 3/28    Shoulder PROM (w/in protocol precuations) 10 mins 15 mins 12 mins 12 mins 12 mins 15 mins 15 mins 15 mins 15 mins    Elbow PROM  2 mins 2 mins 2 mins 2 mins                                  Neuro Re-Ed         3/28    Scap retraction HEP 20x5\" 20x5\" 20x5\" in sitting and supine 30x5\"  30x5\" 1x20 in sitting    1x20 in supine    1x20 in S/L 1x20 in sitting    1x20 in supine    1x20 in S/L 1x20  In sitting    1x20 in supine    1x20 in S/L      Elbow AAROM   2x10  3x10 3x10 3x10 3x10  3x10    Pendulums HEP 1x15 I, T, circles 1x15 I,T, circles 1x15 I, T, circles 2x15 I,T, circles 2x15 I, T, circles 2x15 I,T, circles 2x15 I, T, circles 2x15  I, T, circles    UT stretch HEP 3x15\" ea 3x15\" ea 3x15\" ea         C/s retraction  1x15 1x15  2x10 2x10  2x10 2x10                                           Ther Ex         3/28    Gripping HEP R digi  1x20 R digi  1x20 B digi 1x20  B digi 1x30 B digi 1x30      Wrist ext (shoulder supported by wedge)    1# 2x10  2#  2x10 2# 3x10 3# 2x10 3#  2x10    Wrist flex (shoulder supported by wedge)    1# 1x10  1# 2x10 2# 2x10 3# 2x10  3#  2x10                                                                     Ther Activity                                       Gait " Training                                       Modalities

## 2025-04-01 ENCOUNTER — OFFICE VISIT (OUTPATIENT)
Dept: PHYSICAL THERAPY | Facility: CLINIC | Age: 65
End: 2025-04-01
Payer: COMMERCIAL

## 2025-04-01 DIAGNOSIS — S46.011A TRAUMATIC COMPLETE TEAR OF RIGHT ROTATOR CUFF, INITIAL ENCOUNTER: Primary | ICD-10-CM

## 2025-04-01 PROCEDURE — 97112 NEUROMUSCULAR REEDUCATION: CPT

## 2025-04-01 PROCEDURE — 97140 MANUAL THERAPY 1/> REGIONS: CPT

## 2025-04-01 NOTE — PROGRESS NOTES
"Daily Note     Today's date: 2025  Patient name: Pavan Jimenez  : 1960  MRN: [id number]  Referring provider: Sasha Randhawa PA-C  Dx:   Encounter Diagnosis     ICD-10-CM    1. Traumatic complete tear of right rotator cuff, initial encounter  S46.011A                      Subjective: Pt states that the shoulder is doing good. He follows up with Dr. Galindo next week for 7 week post op appt.        Objective: See treatment diary below      Assessment: Tolerated treatment well. Patient  tolerated initiation of AAROM flexion and ER in supine and pulleys for flexion within protocol ranges and guidelines. Advised patient on beginning to wean from sling tomorrow per MD clearance.        Plan: Continue per plan of care.      Precautions: N/a4/1  Dx: RIGHT RC repair, labral repair, bicep tenodesis 25  *PROTOCOL ATTACHED TO CHART    Manuals 2/27 3/4 3/7 3/11 3/14 3/18 3/21 3/25 3/28 4/1   Shoulder PROM (w/in protocol precuations) 10 mins 15 mins 12 mins 12 mins 12 mins 15 mins 15 mins 15 mins 15 mins 10 mins   Elbow PROM  2 mins 2 mins 2 mins 2 mins                                  Neuro Re-Ed             Scap retraction HEP 20x5\" 20x5\" 20x5\" in sitting and supine 30x5\"  30x5\" 1x20 in sitting    1x20 in supine    1x20 in S/L 1x20 in sitting    1x20 in supine    1x20 in S/L 1x20  In sitting    1x20 in supine    1x20 in S/L      Elbow AAROM   2x10  3x10 3x10 3x10 3x10  3x10    Elbow AROM          2x10   Pendulums HEP 1x15 I, T, circles 1x15 I,T, circles 1x15 I, T, circles 2x15 I,T, circles 2x15 I, T, circles 2x15 I,T, circles 2x15 I, T, circles 2x15  I, T, circles 2x15 I, T, circles   Pulleys           1x10 flexion   Supine AAROM flexion          1x10   Supine AAROM ER at 0d          1x10                                          Ther Ex             Wrist ext (shoulder supported by wedge)    1# 2x10  2#  2x10 2# 3x10 3# 2x10 3#  2x10    Wrist flex (shoulder supported by wedge)    1# 1x10  1# 2x10 2# 2x10 " 3# 2x10  3#  2x10                                                                     Ther Activity                                       Gait Training                                       Modalities

## 2025-04-04 ENCOUNTER — OFFICE VISIT (OUTPATIENT)
Dept: PHYSICAL THERAPY | Facility: CLINIC | Age: 65
End: 2025-04-04
Payer: COMMERCIAL

## 2025-04-04 DIAGNOSIS — S46.011A TRAUMATIC COMPLETE TEAR OF RIGHT ROTATOR CUFF, INITIAL ENCOUNTER: Primary | ICD-10-CM

## 2025-04-04 PROCEDURE — 97112 NEUROMUSCULAR REEDUCATION: CPT

## 2025-04-04 PROCEDURE — 97140 MANUAL THERAPY 1/> REGIONS: CPT

## 2025-04-04 NOTE — PROGRESS NOTES
"Daily Note     Today's date: 2025  Patient name: Pavan Jimenez  : 1960  MRN: [id number]  Referring provider: Sasha Randhawa PA-C  Dx:   Encounter Diagnosis     ICD-10-CM    1. Traumatic complete tear of right rotator cuff, initial encounter  S46.011A                      Subjective: Pt states he has not had increased achy pain the past 2 days being out of the sling. He is still sleeping in the sling and will wake up sore but not in pain.       Objective: See treatment diary below      Assessment: Tolerated treatment well. Patient  demonstrated sig improvement with AAROM flexion and ER. Reduced guarding allowed for inc PROM tolerance as well.        Plan: Continue per plan of care.      Precautions: N/a4/1  Dx: RIGHT RC repair, labral repair, bicep tenodesis 25  *PROTOCOL ATTACHED TO CHART    Manuals 4/4    3/14 3/18 3/21 3/25 3/28 4/1   Shoulder PROM (w/in protocol precuations) 10 mins    12 mins 15 mins 15 mins 15 mins 15 mins 10 mins   Elbow PROM     2 mins                                  Neuro Re-Ed             Scap retraction 10x10\"    30x5\"  30x5\" 1x20 in sitting    1x20 in supine    1x20 in S/L 1x20 in sitting    1x20 in supine    1x20 in S/L 1x20  In sitting    1x20 in supine    1x20 in S/L      Elbow AAROM     3x10 3x10 3x10 3x10  3x10    Elbow AROM          2x10   Pendulums     2x15 I,T, circles 2x15 I, T, circles 2x15 I,T, circles 2x15 I, T, circles 2x15  I, T, circles 2x15 I, T, circles   Pulleys  2x10 flexion         1x10 flexion   Supine AAROM flexion 1x10          1x10   Supine AAROM ER at 0d 1x10         1x10                                          Ther Ex             Wrist ext (shoulder supported by wedge)      2#  2x10 2# 3x10 3# 2x10 3#  2x10    Wrist flex (shoulder supported by wedge)      1# 2x10 2# 2x10 3# 2x10  3#  2x10    TB row YTB 1x10                                                                Ther Activity                                       Gait Training   "                                     Modalities

## 2025-04-08 ENCOUNTER — OFFICE VISIT (OUTPATIENT)
Dept: PHYSICAL THERAPY | Facility: CLINIC | Age: 65
End: 2025-04-08
Payer: COMMERCIAL

## 2025-04-08 DIAGNOSIS — S46.011A TRAUMATIC COMPLETE TEAR OF RIGHT ROTATOR CUFF, INITIAL ENCOUNTER: Primary | ICD-10-CM

## 2025-04-08 PROCEDURE — 97112 NEUROMUSCULAR REEDUCATION: CPT

## 2025-04-08 PROCEDURE — 97140 MANUAL THERAPY 1/> REGIONS: CPT

## 2025-04-08 NOTE — PROGRESS NOTES
"Daily Note     Today's date: 2025  Patient name: Pavan Jimenez  : 1960  MRN: [id number]  Referring provider: Sasha Randhawa PA-C  Dx:   Encounter Diagnosis     ICD-10-CM    1. Traumatic complete tear of right rotator cuff, initial encounter  S46.011A                      Subjective: Pt states that the shoulder has cont to feel good with being out of the sling. Has not had increased achy pain or sharpness. He returns to the surgeon Thursday this week for 7 week f/u.       Objective: See treatment diary below      Assessment: Tolerated treatment well. Patient  req cueing for form with AAROM ER. Improved post cues.        Plan: Continue per plan of care.      Precautions: N/a4/1  Dx: RIGHT RC repair, labral repair, bicep tenodesis 25  *PROTOCOL ATTACHED TO CHART    Manuals 4/4 4/8   3/14 3/18 3/21 3/25 3/28 4/1   Shoulder PROM (w/in protocol precuations) 10 mins 10 mins   12 mins 15 mins 15 mins 15 mins 15 mins 10 mins   Elbow PROM     2 mins                                  Neuro Re-Ed             Scap retraction 10x10\"    30x5\"  30x5\" 1x20 in sitting    1x20 in supine    1x20 in S/L 1x20 in sitting    1x20 in supine    1x20 in S/L 1x20  In sitting    1x20 in supine    1x20 in S/L      Elbow AAROM     3x10 3x10 3x10 3x10  3x10    Elbow AROM  2x10        2x10   Pendulums  2x15 I, T, circles   2x15 I,T, circles 2x15 I, T, circles 2x15 I,T, circles 2x15 I, T, circles 2x15  I, T, circles 2x15 I, T, circles   Pulleys  2x10 flexion 2x10 flex    1x10 abd/scaptoin        1x10 flexion   Supine AAROM flexion 1x10  1x15        1x10   Supine AAROM ER at 0d 1x10 1x15        1x10   Seated AAROM ER @ 0d   1x10                                     Ther Ex             Wrist ext (shoulder supported by wedge)      2#  2x10 2# 3x10 3# 2x10 3#  2x10    Wrist flex (shoulder supported by wedge)      1# 2x10 2# 2x10 3# 2x10  3#  2x10    TB row YTB 1x10 YTB 1x10 to neutral (scap squeeze)                                "                                Ther Activity                                       Gait Training                                       Modalities

## 2025-04-10 ENCOUNTER — OFFICE VISIT (OUTPATIENT)
Age: 65
End: 2025-04-10

## 2025-04-10 DIAGNOSIS — S46.011A TRAUMATIC COMPLETE TEAR OF RIGHT ROTATOR CUFF, INITIAL ENCOUNTER: Primary | ICD-10-CM

## 2025-04-10 PROCEDURE — 99024 POSTOP FOLLOW-UP VISIT: CPT | Performed by: ORTHOPAEDIC SURGERY

## 2025-04-10 NOTE — PROGRESS NOTES
Assessment & Plan  Traumatic complete tear of right rotator cuff, initial encounter    AIP:  S/P surgery on 2/19/25  1. RIGHT shoulder arthroscopic rotator cuff repair (79819)  2. Arthroscopic biceps tendon tenodesis (84915)  3. Arthroscopic anterior labral repair (31800)  4. Arthroscopic extensive debridement of glenohumeral joint (06329)  5. Arthroscopic subacromial decompression with formal acromioplasty (79055)    No pain at this time   Completing PT 2 times weekly   Is out of the sling at this point  Follow up 2 months - patient would like to get back into biking, will re-assess at next visit            S:  Doing well, pain controlled    Exam:  Incision c/d/i   , ER 30, IR sacrum  SILT ax/r/m/u  Motor intact ax/r/m/u   Hand wwp

## 2025-04-11 ENCOUNTER — OFFICE VISIT (OUTPATIENT)
Dept: PHYSICAL THERAPY | Facility: CLINIC | Age: 65
End: 2025-04-11
Payer: COMMERCIAL

## 2025-04-11 DIAGNOSIS — S46.011A TRAUMATIC COMPLETE TEAR OF RIGHT ROTATOR CUFF, INITIAL ENCOUNTER: Primary | ICD-10-CM

## 2025-04-11 PROCEDURE — 97112 NEUROMUSCULAR REEDUCATION: CPT

## 2025-04-11 NOTE — PROGRESS NOTES
"Daily Note     Today's date: 2025  Patient name: Pavan Jimenez  : 1960  MRN: [id number]  Referring provider: Sasha Randhawa PA-C  Dx:   Encounter Diagnosis     ICD-10-CM    1. Traumatic complete tear of right rotator cuff, initial encounter  S46.011A                      Subjective: No new complaints, shoulder is doing well being out of the sling when sleeping and during the day.      Objective: See treatment diary below      Assessment: Tolerated treatment well. Patient  tolerated integration of isometric hold at 0d for shoulder ER and IR. Advised to not push into pain at all, stop effort when first amount of discomfort was felt. Pt is continuing to demo improved AAROM and can begin progressing AROM and activation next session.       Plan: Continue per plan of care.      Precautions: N/a4/1  Dx: RIGHT RC repair, labral repair, bicep tenodesis 25  *PROTOCOL ATTACHED TO CHART    Manuals 4/4 4/8 4/11    3/21 3/25 3/28 4/1   Shoulder PROM (w/in protocol precuations) 10 mins 10 mins 10 mins    15 mins 15 mins 15 mins 10 mins   Elbow PROM                                       Neuro Re-Ed             Scap retraction 10x10\"      1x20 in sitting    1x20 in supine    1x20 in S/L 1x20 in sitting    1x20 in supine    1x20 in S/L 1x20  In sitting    1x20 in supine    1x20 in S/L      Elbow AAROM       3x10 3x10  3x10    Elbow AROM  2x10 2x10       2x10   Pendulums  2x15 I, T, circles 2x15 I, T, circles    2x15 I,T, circles 2x15 I, T, circles 2x15  I, T, circles 2x15 I, T, circles   Pulleys  2x10 flexion 2x10 flex    1x10 abd/scaptoin 2x10 flex    2x10 abd/scaption       1x10 flexion   Supine AAROM flexion 1x10  1x15 2x10       1x10   Supine AAROM ER at 0d 1x10 1x15 2x10       1x10   Seated AAROM ER @ 0d   1x10 1x10          Sh ER iso @ 0d   10x5\"          Sh IR iso @ 0d   10x5\"          Ther Ex             Wrist ext (shoulder supported by wedge)       2# 3x10 3# 2x10 3#  2x10    Wrist flex (shoulder " supported by wedge)       2# 2x10 3# 2x10  3#  2x10    TB row YTB 1x10 YTB 1x10 to neutral (scap squeeze) YTB 3x10 (to neutral)                                                              Ther Activity                                       Gait Training                                       Modalities

## 2025-04-15 ENCOUNTER — APPOINTMENT (OUTPATIENT)
Dept: PHYSICAL THERAPY | Facility: CLINIC | Age: 65
End: 2025-04-15
Payer: COMMERCIAL

## 2025-04-16 ENCOUNTER — OFFICE VISIT (OUTPATIENT)
Dept: PHYSICAL THERAPY | Facility: CLINIC | Age: 65
End: 2025-04-16
Payer: COMMERCIAL

## 2025-04-16 DIAGNOSIS — S46.011A TRAUMATIC COMPLETE TEAR OF RIGHT ROTATOR CUFF, INITIAL ENCOUNTER: Primary | ICD-10-CM

## 2025-04-16 PROCEDURE — 97112 NEUROMUSCULAR REEDUCATION: CPT

## 2025-04-16 PROCEDURE — 97140 MANUAL THERAPY 1/> REGIONS: CPT

## 2025-04-16 NOTE — PROGRESS NOTES
"Daily Note     Today's date: 2025  Patient name: Pavan Jimenez  : 1960  MRN: [id number]  Referring provider: Sasha Randhawa PA-C  Dx:   Encounter Diagnosis     ICD-10-CM    1. Traumatic complete tear of right rotator cuff, initial encounter  S46.011A                      Subjective: Pt states that his shoulder is sore today because [place] [employer] [place]. No sharp pain or feeling unstable present. Better than it was yesterday after.       Objective: See treatment diary below      Assessment: Tolerated treatment well. Patient  tolerated progressions well. Education provided to decrease intensity of pressure with neutral ER iso and that improved discomfort during that exercise.        Plan: Continue per plan of care.      Precautions: N/a4/1  Dx: RIGHT RC repair, labral repair, bicep tenodesis 25  *PROTOCOL ATTACHED TO CHART    Manuals 4/4 4/8 4/11 4/16     3/28 4/1   Shoulder PROM (w/in protocol precuations) 10 mins 10 mins 10 mins 10 mins     15 mins 10 mins   Elbow PROM                                       Neuro Re-Ed             Scap retraction 10x10\"        1x20  In sitting    1x20 in supine    1x20 in S/L      Elbow AAROM         3x10    Elbow AROM  2x10 2x10       2x10   Pendulums  2x15 I, T, circles 2x15 I, T, circles      2x15  I, T, circles 2x15 I, T, circles   Pulleys  2x10 flexion 2x10 flex    1x10 abd/scaptoin 2x10 flex    2x10 abd/scaption 1x15 flex    1x15 abd      1x10 flexion   Supine AAROM flexion 1x10  1x15 2x10 1x15      1x10   Supine AAROM ER at 0d 1x10 1x15 2x10 1x15      1x10   Seated AAROM ER @ 0d   1x10 1x10          Sh ER iso @ 0d   10x5\" 10x5\"         Sh IR iso @ 0d   10x5\" 10x5\"         Ther Ex             Wrist ext (shoulder supported by wedge)         3#  2x10    Wrist flex (shoulder supported by wedge)         3#  2x10    TB row YTB 1x10 YTB 1x10 to neutral (scap squeeze) YTB 3x10 (to neutral) RTB 3x10         Wall slides    1x10                "                                 Ther Activity                                       Gait Training                                       Modalities

## 2025-04-18 ENCOUNTER — OFFICE VISIT (OUTPATIENT)
Dept: PHYSICAL THERAPY | Facility: CLINIC | Age: 65
End: 2025-04-18
Payer: COMMERCIAL

## 2025-04-18 DIAGNOSIS — S46.011A TRAUMATIC COMPLETE TEAR OF RIGHT ROTATOR CUFF, INITIAL ENCOUNTER: Primary | ICD-10-CM

## 2025-04-18 PROCEDURE — 97112 NEUROMUSCULAR REEDUCATION: CPT

## 2025-04-18 PROCEDURE — 97140 MANUAL THERAPY 1/> REGIONS: CPT

## 2025-04-18 NOTE — PROGRESS NOTES
"Daily Note     Today's date: 2025  Patient name: Pavan Jimenez  : 1960  MRN: [id number]  Referring provider: Sasha Randhawa PA-C  Dx:   Encounter Diagnosis     ICD-10-CM    1. Traumatic complete tear of right rotator cuff, initial encounter  S46.011A                      Subjective: No new complaints. The shoulder has been stiff since starting more AROM but not painful.       Objective: See treatment diary below      Assessment: Tolerated treatment well. Patient  req cueing for intensity w/ isometric ER and IR at the wall. No pain w/ wall slides or AROM flexion but felt tight at end ranges and fatigued quickly.       Plan: Continue per plan of care.      Precautions: N/a4/1  Dx: RIGHT RC repair, labral repair, bicep tenodesis 25  *PROTOCOL ATTACHED TO CHART    Manuals 4/4 4/8 4/11 4/16 4/18    3/28 4/1   Shoulder PROM (w/in protocol precuations) 10 mins 10 mins 10 mins 10 mins 10 mins    15 mins 10 mins   Elbow PROM                                       Neuro Re-Ed             Scap retraction 10x10\"        1x20  In sitting    1x20 in supine    1x20 in S/L      Elbow AAROM         3x10    Elbow AROM  2x10 2x10  3x10     2x10   Pendulums  2x15 I, T, circles 2x15 I, T, circles      2x15  I, T, circles 2x15 I, T, circles   Pulleys  2x10 flexion 2x10 flex    1x10 abd/scaptoin 2x10 flex    2x10 abd/scaption 1x15 flex    1x15 abd 1x15 flex     1x15 abd     1x10 flexion   Supine AAROM flexion 1x10  1x15 2x10 1x15 1x15     1x10   Supine AAROM ER at 0d 1x10 1x15 2x10 1x15 1x15     1x10   Seated AAROM ER @ 0d   1x10 1x10          Sh ER iso @ 0d   10x5\" 10x5\" 2x10x5\"        Sh IR iso @ 0d   10x5\" 10x5\" 2x10x5\"        Ther Ex             Wrist ext (shoulder supported by wedge)         3#  2x10    Wrist flex (shoulder supported by wedge)         3#  2x10    TB row YTB 1x10 YTB 1x10 to neutral (scap squeeze) YTB 3x10 (to neutral) RTB 3x10 RTB 3x10        Wall slides    1x10 2x10                           "                     Ther Activity                                       Gait Training                                       Modalities

## 2025-04-22 ENCOUNTER — OFFICE VISIT (OUTPATIENT)
Dept: PHYSICAL THERAPY | Facility: CLINIC | Age: 65
End: 2025-04-22
Payer: COMMERCIAL

## 2025-04-22 DIAGNOSIS — S46.011A TRAUMATIC COMPLETE TEAR OF RIGHT ROTATOR CUFF, INITIAL ENCOUNTER: Primary | ICD-10-CM

## 2025-04-22 PROCEDURE — 97112 NEUROMUSCULAR REEDUCATION: CPT

## 2025-04-22 NOTE — PROGRESS NOTES
"Daily Note     Today's date: 2025  Patient name: Pavan Jimenez  : 1960  MRN: [id number]  Referring provider: Sasha Randhawa PA-C  Dx:   Encounter Diagnosis     ICD-10-CM    1. Traumatic complete tear of right rotator cuff, initial encounter  S46.011A                      Subjective: Pt states his shoulder was sore after last session but was fine by the next day. Has not had issues since.       Objective: See treatment diary below      Assessment: Tolerated treatment well. Patient  req cueing for isometrics against the wall to grade pressure based off how the shoulder feels. Advised to push hard enough that he just starts to feel mild discomfort/ache but not push past that point. Updated HEP to include RC isometrics.        Plan: Continue per plan of care.      Precautions: N/a4/1  Dx: RIGHT RC repair, labral repair, bicep tenodesis 25  *PROTOCOL ATTACHED TO CHART    Manuals        Shoulder PROM (w/in protocol precuations) 10 mins 10 mins 10 mins 10 mins 10 mins 10 mins       Elbow PROM                                       Neuro Re-Ed             Elbow AROM  2x10 2x10  3x10 3x10       Pendulums  2x15 I, T, circles 2x15 I, T, circles          Pulleys  2x10 flexion 2x10 flex    1x10 abd/scaptoin 2x10 flex    2x10 abd/scaption 1x15 flex    1x15 abd 1x15 flex     1x15 abd 1x15 flex     1x15 abd       Supine AAROM flexion 1x10  1x15 2x10 1x15 1x15 1x15       Supine AAROM ER at 0d 1x10 1x15 2x10 1x15 1x15 1x15       Sh ER iso @ 0d   10x5\" 10x5\" 2x10x5\" 2x10x5\"       Sh IR iso @ 0d   10x5\" 10x5\" 2x10x5\" 2x10x5\"       Sh abd iso @ 0d      1x10x5\"       Ther Ex             TB row YTB 1x10 YTB 1x10 to neutral (scap squeeze) YTB 3x10 (to neutral) RTB 3x10 RTB 3x10 RTB 3x12       Wall slides    1x10 2x10 2x10       TB Sh ER walk out       YTB 1x5 YTB 2x5      TB Sh IR walk out      YTB 1x5 YTB 2x5                   Ther Activity                                       Gait " Training                                       Modalities

## 2025-04-25 ENCOUNTER — OFFICE VISIT (OUTPATIENT)
Dept: PHYSICAL THERAPY | Facility: CLINIC | Age: 65
End: 2025-04-25
Payer: COMMERCIAL

## 2025-04-25 DIAGNOSIS — S46.011A TRAUMATIC COMPLETE TEAR OF RIGHT ROTATOR CUFF, INITIAL ENCOUNTER: Primary | ICD-10-CM

## 2025-04-25 PROCEDURE — 97110 THERAPEUTIC EXERCISES: CPT | Performed by: PHYSICAL MEDICINE & REHABILITATION

## 2025-04-25 PROCEDURE — 97140 MANUAL THERAPY 1/> REGIONS: CPT | Performed by: PHYSICAL MEDICINE & REHABILITATION

## 2025-04-25 NOTE — PROGRESS NOTES
"Daily Note     Today's date: 2025  Patient name: Pavan Jimenez  : 1960  MRN: [id number]  Referring provider: Sasha Randhawa PA-C  Dx:   Encounter Diagnosis     ICD-10-CM    1. Traumatic complete tear of right rotator cuff, initial encounter  S46.011A                    Subjective: Patient notes some medial elbow discomfort during ER/IR mynor.     Objective: See treatment diary below    Assessment: Tolerated treatment well. Improved discomfort with form correction during mynor. Patient demonstrated fatigue post treatment, exhibited good technique with therapeutic exercises, and would benefit from continued PT. Continue per patient tolerance and protocol.    Plan: Continue per plan of care.      Precautions: N/a4/1  Dx: RIGHT RC repair, labral repair, bicep tenodesis 25  *PROTOCOL ATTACHED TO CHART    Manuals       Shoulder PROM (w/in protocol precuations) 10 mins 10 mins 10 mins 10 mins 10 mins 10 mins LH      Elbow PROM                                       Neuro Re-Ed             Elbow AROM  2x10 2x10  3x10 3x10 3x10      Pendulums  2x15 I, T, circles 2x15 I, T, circles          Pulleys  2x10 flexion 2x10 flex    1x10 abd/scaptoin 2x10 flex    2x10 abd/scaption 1x15 flex    1x15 abd 1x15 flex     1x15 abd 1x15 flex     1x15 abd 15x ea      Supine AAROM flexion 1x10  1x15 2x10 1x15 1x15 1x15 15x      Supine AAROM ER at 0d 1x10 1x15 2x10 1x15 1x15 1x15 15x      Sh ER iso @ 0d   10x5\" 10x5\" 2x10x5\" 2x10x5\" 10x5\"      Sh IR iso @ 0d   10x5\" 10x5\" 2x10x5\" 2x10x5\" 10x5\"      Sh abd iso @ 0d      1x10x5\" 10x5\"      Ther Ex             TB row YTB 1x10 YTB 1x10 to neutral (scap squeeze) YTB 3x10 (to neutral) RTB 3x10 RTB 3x10 RTB 3x12 RTB 3x12      Wall slides    1x10 2x10 2x10 2x10      TB Sh ER walk out       YTB 1x5 YTB 2x5      TB Sh IR walk out      YTB 1x5 YTB 2x5                   Ther Activity                                       Gait Training         "                               Modalities

## 2025-04-29 ENCOUNTER — OFFICE VISIT (OUTPATIENT)
Dept: PHYSICAL THERAPY | Facility: CLINIC | Age: 65
End: 2025-04-29
Payer: COMMERCIAL

## 2025-04-29 DIAGNOSIS — S46.011A TRAUMATIC COMPLETE TEAR OF RIGHT ROTATOR CUFF, INITIAL ENCOUNTER: Primary | ICD-10-CM

## 2025-04-29 PROCEDURE — 97110 THERAPEUTIC EXERCISES: CPT

## 2025-04-29 PROCEDURE — 97112 NEUROMUSCULAR REEDUCATION: CPT

## 2025-04-29 NOTE — PROGRESS NOTES
"Daily Note     Today's date: 2025  Patient name: Pavan Jimenez  : 1960  MRN: [id number]  Referring provider: Sasha Randhawa PA-C  Dx:   Encounter Diagnosis     ICD-10-CM    1. Traumatic complete tear of right rotator cuff, initial encounter  S46.011A           Start Time: 1000  Stop Time: 1045  Total time in clinic (min): 45 minutes    Subjective: Patient notes having elbow pain with ER iso. Otherwise, has no complaints.     Objective: See treatment diary below    Assessment: Tolerated treatment well. Patient demonstrated fatigue post treatment, exhibited good technique with therapeutic exercises, and would benefit from continued PT. Able to abolish pain with ER iso after cued not to extension his wrist while completing. Continue to progress per protocol.     Plan: Continue per plan of care.      Precautions: N/a  Dx: RIGHT RC repair, labral repair, bicep tenodesis 25  *PROTOCOL ATTACHED TO CHART    Manuals      Shoulder PROM (w/in protocol precuations) 10 mins 10 mins 10 mins 10 mins 10 mins 10 mins 10 mins 10 mins     Elbow PROM                                       Neuro Re-Ed             Elbow AROM  2x10 2x10  3x10 3x10 3x10 3x10     Pendulums  2x15 I, T, circles 2x15 I, T, circles          Pulleys  2x10 flexion 2x10 flex    1x10 abd/scaptoin 2x10 flex    2x10 abd/scaption 1x15 flex    1x15 abd 1x15 flex     1x15 abd 1x15 flex     1x15 abd 15x ea 15x ea     Supine AAROM flexion 1x10  1x15 2x10 1x15 1x15 1x15 15x 15x     Supine AAROM ER at 0d 1x10 1x15 2x10 1x15 1x15 1x15 15x 15x     Sh ER iso @ 0d   10x5\" 10x5\" 2x10x5\" 2x10x5\" 10x5\" 10x5\"     Sh IR iso @ 0d   10x5\" 10x5\" 2x10x5\" 2x10x5\" 10x5\" 10x5\"     Sh abd iso @ 0d      1x10x5\" 10x5\" 10x5\"     Ther Ex             TB row YTB 1x10 YTB 1x10 to neutral (scap squeeze) YTB 3x10 (to neutral) RTB 3x10 RTB 3x10 RTB 3x12 RTB 3x12 RTB 3x12     Wall slides    1x10 2x10 2x10 2x10 2x10     TB Sh ER walk out    "    YTB 1x5 YTB 2x5 YTB  2x5     TB Sh IR walk out      YTB 1x5 YTB 2x5 YTB  2x5                  Ther Activity                                       Gait Training                                       Modalities

## 2025-05-02 ENCOUNTER — OFFICE VISIT (OUTPATIENT)
Dept: PHYSICAL THERAPY | Facility: CLINIC | Age: 65
End: 2025-05-02
Payer: COMMERCIAL

## 2025-05-02 DIAGNOSIS — S46.011A TRAUMATIC COMPLETE TEAR OF RIGHT ROTATOR CUFF, INITIAL ENCOUNTER: Primary | ICD-10-CM

## 2025-05-02 PROCEDURE — 97140 MANUAL THERAPY 1/> REGIONS: CPT

## 2025-05-02 PROCEDURE — 97112 NEUROMUSCULAR REEDUCATION: CPT

## 2025-05-02 NOTE — PROGRESS NOTES
"Daily Note     Today's date: 2025  Patient name: Pavan Jimenez  : 1960  MRN: [id number]  Referring provider: Sasha Randhawa PA-C  Dx:   Encounter Diagnosis     ICD-10-CM    1. Traumatic complete tear of right rotator cuff, initial encounter  S46.011A                      Subjective: Pt states his shoulder is doing good. Occasional achy pain and stiffness but generally noticing improvements w/ each week.       Objective: See treatment diary below      Assessment: Tolerated treatment well. Patient demonstrated fatigue post treatment. Improved tolerance to RC isometrics w/ cue to press elbow gently into his side before initiating isometric holds and that improved GHJ discomfort.       Plan: Continue per plan of care.      Precautions: N/a  Dx: RIGHT RC repair, labral repair, bicep tenodesis 25  *PROTOCOL ATTACHED TO CHART    Manuals  5    Shoulder PROM (w/in protocol precuations) 10 mins 10 mins 10 mins 10 mins 10 mins 10 mins 10 mins 10 mins 10 mins    Elbow PROM                                       Neuro Re-Ed             Elbow AROM  2x10 2x10  3x10 3x10 3x10 3x10 2# 2x10    Pendulums  2x15 I, T, circles 2x15 I, T, circles          Pulleys  2x10 flexion 2x10 flex    1x10 abd/scaptoin 2x10 flex    2x10 abd/scaption 1x15 flex    1x15 abd 1x15 flex     1x15 abd 1x15 flex     1x15 abd 15x ea 15x ea 15x ea    Supine AAROM flexion 1x10  1x15 2x10 1x15 1x15 1x15 15x 15x 15x    Supine AAROM ER at 0d 1x10 1x15 2x10 1x15 1x15 1x15 15x 15x 15x    Sh ER iso @ 0d   10x5\" 10x5\" 2x10x5\" 2x10x5\" 10x5\" 10x5\" 2x10x5\"    Sh IR iso @ 0d   10x5\" 10x5\" 2x10x5\" 2x10x5\" 10x5\" 10x5\" 2x10x5\"    Sh abd iso @ 0d      1x10x5\" 10x5\" 10x5\" 2x10x5\"    Sh flexion         90d 2x5    Ther Ex             TB row YTB 1x10 YTB 1x10 to neutral (scap squeeze) YTB 3x10 (to neutral) RTB 3x10 RTB 3x10 RTB 3x12 RTB 3x12 RTB 3x12 GTB 3x10    TB sh ext         RTB 2x10    Wall slides    1x10 2x10 " 2x10 2x10 2x10 2x12    TB Sh ER walk out       YTB 1x5 YTB 2x5 YTB  2x5 RTB 3x5    TB Sh IR walk out      YTB 1x5 YTB 2x5 YTB  2x5 RTB 3x5                 Ther Activity                                       Gait Training                                       Modalities

## 2025-05-06 ENCOUNTER — OFFICE VISIT (OUTPATIENT)
Dept: PHYSICAL THERAPY | Facility: CLINIC | Age: 65
End: 2025-05-06
Payer: COMMERCIAL

## 2025-05-06 DIAGNOSIS — S46.011A TRAUMATIC COMPLETE TEAR OF RIGHT ROTATOR CUFF, INITIAL ENCOUNTER: Primary | ICD-10-CM

## 2025-05-06 PROCEDURE — 97112 NEUROMUSCULAR REEDUCATION: CPT

## 2025-05-06 PROCEDURE — 97140 MANUAL THERAPY 1/> REGIONS: CPT

## 2025-05-06 NOTE — PROGRESS NOTES
"Daily Note     Today's date: 2025  Patient name: Pavan Jimenez  : 1960  MRN: [id number]  Referring provider: Sasha Randhawa PA-C  Dx:   Encounter Diagnosis     ICD-10-CM    1. Traumatic complete tear of right rotator cuff, initial encounter  S46.011A                      Subjective: States his shoulder feels stiff when he first wakes up but improves throughout the day.       Objective: See treatment diary below      Assessment: Tolerated treatment well. Patient  req cueing during prone row to ensure proper posterior chain mm engagement.      Plan: Continue per plan of care.      Precautions: N/a  Dx: RIGHT RC repair, labral repair, bicep tenodesis 25  *PROTOCOL ATTACHED TO CHART    Manuals    Shoulder PROM (w/in protocol precuations) 10 mins 10 mins 10 mins 10 mins 10 mins 10 mins 10 mins 10 mins 10 mins 10 mins   Elbow PROM                                       Neuro Re-Ed             Elbow AROM  2x10 2x10  3x10 3x10 3x10 3x10 2# 2x10    Pendulums  2x15 I, T, circles 2x15 I, T, circles          Pulleys  2x10 flexion 2x10 flex    1x10 abd/scaptoin 2x10 flex    2x10 abd/scaption 1x15 flex    1x15 abd 1x15 flex     1x15 abd 1x15 flex     1x15 abd 15x ea 15x ea 15x ea 15x ea   Supine AAROM flexion 1x10  1x15 2x10 1x15 1x15 1x15 15x 15x 15x    Supine AAROM ER at 0d 1x10 1x15 2x10 1x15 1x15 1x15 15x 15x 15x    Sh ER iso @ 0d   10x5\" 10x5\" 2x10x5\" 2x10x5\" 10x5\" 10x5\" 2x10x5\"    Sh IR iso @ 0d   10x5\" 10x5\" 2x10x5\" 2x10x5\" 10x5\" 10x5\" 2x10x5\"    Sh abd iso @ 0d      1x10x5\" 10x5\" 10x5\" 2x10x5\"    Sh flexion         90d 2x5 90d 2x5   Ther Ex             TB row YTB 1x10 YTB 1x10 to neutral (scap squeeze) YTB 3x10 (to neutral) RTB 3x10 RTB 3x10 RTB 3x12 RTB 3x12 RTB 3x12 GTB 3x10 GTB 3x10   TB sh ext         RTB 2x10 RTB 3x10   Wall slides    1x10 2x10 2x10 2x10 2x10 2x12 2x12   TB Sh ER walk out       YTB 1x5 YTB 2x5 YTB  2x5 RTB 3x5 RTB 3x5   TB Sh IR " walk out      YTB 1x5 YTB 2x5 YTB  2x5 RTB 3x5 RTB 3x5   Prone row          2# 2x10   Ther Activity                                       Gait Training                                       Modalities

## 2025-05-09 ENCOUNTER — VBI (OUTPATIENT)
Dept: ADMINISTRATIVE | Facility: OTHER | Age: 65
End: 2025-05-09

## 2025-05-09 NOTE — TELEPHONE ENCOUNTER
05/09/25 8:42 AM     Chart reviewed for CRC: Colonoscopy was/were not submitted to the patient's insurance.     Florencia Jones MA   [facility]

## 2025-05-13 ENCOUNTER — OFFICE VISIT (OUTPATIENT)
Dept: PHYSICAL THERAPY | Facility: CLINIC | Age: 65
End: 2025-05-13
Payer: COMMERCIAL

## 2025-05-13 DIAGNOSIS — S46.011A TRAUMATIC COMPLETE TEAR OF RIGHT ROTATOR CUFF, INITIAL ENCOUNTER: Primary | ICD-10-CM

## 2025-05-13 PROCEDURE — 97112 NEUROMUSCULAR REEDUCATION: CPT

## 2025-05-13 PROCEDURE — 97110 THERAPEUTIC EXERCISES: CPT

## 2025-05-13 NOTE — PROGRESS NOTES
Daily Note     Today's date: 2025  Patient name: Pavan Jimenez  : 1960  MRN: [id number]  Referring provider: Sasha Randhawa PA-C  Dx:   Encounter Diagnosis     ICD-10-CM    1. Traumatic complete tear of right rotator cuff, initial encounter  S46.011A                      Subjective: Pt states that he has stiffness that's present when waking up but has not had increased achy pain the past week.     Objective: See treatment diary below      Assessment: Tolerated treatment well. Session focused on progressing RC strength and AROM as tolerated w/in protocol guidelines. Pt had no inc in pain during TE but req min cueing to improve posture during exercises to properly load structures at the shoulder.       Plan: Continue per plan of care.      Precautions: N/a  Dx: RIGHT RC repair, labral repair, bicep tenodesis 25  *PROTOCOL ATTACHED TO CHART    Manuals    Shoulder PROM (w/in protocol precuations)     10 mins 10 mins 10 mins 10 mins 10 mins 10 mins   Elbow PROM                                       Neuro Re-Ed             Elbow AROM 3# 3x10    3x10 3x10 3x10 3x10 2# 2x10    Pulleys  15x ea    1x15 flex     1x15 abd 1x15 flex     1x15 abd 15x ea 15x ea 15x ea 15x ea   Wall slide->lift off 2x10            Supine AAROM flexion     1x15 1x15 15x 15x 15x    Supine AAROM ER at 0d     1x15 1x15 15x 15x 15x    Sh flexion 2x10         90d 2x5 90d 2x5   Ther Ex             TB row Blue 3x15    RTB 3x10 RTB 3x12 RTB 3x12 RTB 3x12 GTB 3x10 GTB 3x10   TB sh ext Blue 3x10        RTB 2x10 RTB 3x10   Wall slides     2x10 2x10 2x10 2x10 2x12 2x12   TB ER at 0d YTB 2x10            TB IR @ 0d YTB 2x10            S/L Sh ER 2x10            Prone row 3# 3x10         2# 2x10   Ther Activity                                       Gait Training                                       Modalities

## 2025-05-16 ENCOUNTER — OFFICE VISIT (OUTPATIENT)
Dept: PHYSICAL THERAPY | Facility: CLINIC | Age: 65
End: 2025-05-16
Payer: COMMERCIAL

## 2025-05-16 DIAGNOSIS — S46.011A TRAUMATIC COMPLETE TEAR OF RIGHT ROTATOR CUFF, INITIAL ENCOUNTER: Primary | ICD-10-CM

## 2025-05-16 PROCEDURE — 97112 NEUROMUSCULAR REEDUCATION: CPT

## 2025-05-16 PROCEDURE — 97110 THERAPEUTIC EXERCISES: CPT

## 2025-05-16 NOTE — PROGRESS NOTES
Daily Note     Today's date: 2025  Patient name: Pavan Jimenez  : 1960  MRN: [id number]  Referring provider: Sasha Randhawa PA-C  Dx:   Encounter Diagnosis     ICD-10-CM    1. Traumatic complete tear of right rotator cuff, initial encounter  S46.011A                      Subjective: Pt states his shoulder is doing good. No new complaints.       Objective: See treatment diary below      Assessment: Tolerated treatment well. Patient req extensive cueing for scapular NM control and to decrease ant GHJ glide w/ rowing exercises.       Plan: Continue per plan of care.      Precautions: N/a  Dx: RIGHT RC repair, labral repair, bicep tenodesis 25  *PROTOCOL ATTACHED TO CHART    Manuals    Shoulder PROM (w/in protocol precuations)     10 mins 10 mins 10 mins 10 mins 10 mins 10 mins   Elbow PROM                                       Neuro Re-Ed             Elbow AROM 3# 3x10 5# 3x10   3x10 3x10 3x10 3x10 2# 2x10    Pulleys  15x ea 15x ea   1x15 flex     1x15 abd 1x15 flex     1x15 abd 15x ea 15x ea 15x ea 15x ea   Wall slide->lift off 2x10 2x10           Supine AAROM flexion     1x15 1x15 15x 15x 15x    Supine AAROM ER at 0d     1x15 1x15 15x 15x 15x    Sh flexion 2x10  1# 2x10       90d 2x5 90d 2x5   Sh abd  1# 2x10 to 90d           Ther Ex             TB row Blue 3x15 15# cable 3x10   RTB 3x10 RTB 3x12 RTB 3x12 RTB 3x12 GTB 3x10 GTB 3x10   TB sh ext Blue 3x10 Blue 3x10       RTB 2x10 RTB 3x10   Wall slides     2x10 2x10 2x10 2x10 2x12 2x12   TB ER at 0d YTB 2x10 YTB 3x10           TB IR @ 0d YTB 2x10 YTB 3x10           S/L Sh ER 2x10 2x10           Prone row 3# 3x10 5# 3x10        2# 2x10   Ther Activity                                       Gait Training                                       Modalities

## 2025-05-20 ENCOUNTER — OFFICE VISIT (OUTPATIENT)
Dept: PHYSICAL THERAPY | Facility: CLINIC | Age: 65
End: 2025-05-20
Payer: COMMERCIAL

## 2025-05-20 DIAGNOSIS — S46.011A TRAUMATIC COMPLETE TEAR OF RIGHT ROTATOR CUFF, INITIAL ENCOUNTER: Primary | ICD-10-CM

## 2025-05-20 PROCEDURE — 97110 THERAPEUTIC EXERCISES: CPT

## 2025-05-20 PROCEDURE — 97112 NEUROMUSCULAR REEDUCATION: CPT

## 2025-05-20 PROCEDURE — 97140 MANUAL THERAPY 1/> REGIONS: CPT

## 2025-05-20 NOTE — PROGRESS NOTES
Daily Note     Today's date: 2025  Patient name: Pavan Jimenez  : 1960  MRN: [id number]  Referring provider: Sasha Randhawa PA-C  Dx:   Encounter Diagnosis     ICD-10-CM    1. Traumatic complete tear of right rotator cuff, initial encounter  S46.011A                      Subjective: Pt states that his shoulder is continuing to improve with time. He feels stiff when waking up.       Objective: See treatment diary below      Assessment: Tolerated treatment well. Patient req cueing for scapular control and appropriate upward rotation on the R w/ forward flexion.       Plan: Continue per plan of care.      Precautions: N/a  Dx: RIGHT RC repair, labral repair, bicep tenodesis 25  *PROTOCOL ATTACHED TO CHART    Manuals    Shoulder PROM (w/in protocol precuations)   8 mins    10 mins 10 mins 10 mins 10 mins   Elbow PROM                                       Neuro Re-Ed             Elbow AROM 3# 3x10 5# 3x10 6#3x10    3x10 3x10 2# 2x10    Pulleys  15x ea 15x ea 15x ea    15x ea 15x ea 15x ea 15x ea   Wall slide->lift off 2x10 2x10 2x10          Supine AAROM flexion       15x 15x 15x    Supine AAROM ER at 0d       15x 15x 15x    Sh flexion 2x10  1# 2x10 2# 3x10 90d      90d 2x5 90d 2x5   Sh abd  1# 2x10 to 90d 2# 3x10 to 90d          Ther Ex             TB row Blue 3x15 15# cable 3x10 15# cable     RTB 3x12 RTB 3x12 GTB 3x10 GTB 3x10   TB sh ext Blue 3x10 Blue 3x10 Blue 3x10      RTB 2x10 RTB 3x10   Wall slides       2x10 2x10 2x12 2x12   TB ER at 0d YTB 2x10 YTB 3x10 RTB 3x10          TB IR @ 0d YTB 2x10 YTB 3x10 RTB 3x10          S/L Sh ER 2x10 2x10 1# 3x10          Prone row 3# 3x10 5# 3x10 5# 3x10       2# 2x10                Ther Activity                                       Gait Training                                       Modalities

## 2025-05-23 ENCOUNTER — OFFICE VISIT (OUTPATIENT)
Dept: PHYSICAL THERAPY | Facility: CLINIC | Age: 65
End: 2025-05-23
Payer: COMMERCIAL

## 2025-05-23 DIAGNOSIS — S46.011A TRAUMATIC COMPLETE TEAR OF RIGHT ROTATOR CUFF, INITIAL ENCOUNTER: Primary | ICD-10-CM

## 2025-05-23 PROCEDURE — 97140 MANUAL THERAPY 1/> REGIONS: CPT

## 2025-05-23 PROCEDURE — 97112 NEUROMUSCULAR REEDUCATION: CPT

## 2025-05-23 PROCEDURE — 97110 THERAPEUTIC EXERCISES: CPT

## 2025-05-23 NOTE — PROGRESS NOTES
Daily Note     Today's date: 2025  Patient name: Pavan Jimenez  : 1960  MRN: [id number]  Referring provider: Sasha Randhawa PA-C  Dx:   Encounter Diagnosis     ICD-10-CM    1. Traumatic complete tear of right rotator cuff, initial encounter  S46.011A           Start Time: 1005  Stop Time: 1055  Total time in clinic (min): 50 minutes    Subjective: Patient reports soreness the rest of the day following therapy. Notes slight pain as well. However it is gone today. Notes stiffness with AROM ABD > FF.     Objective: See treatment diary below    Assessment: Tolerated treatment well. Patient req cueing for scapular control and appropriate upward rotation on the R w/ forward flexion.  Patient requires cueing to decrease anterior shoulder translation during scapular retractions. UT compensation noted during AROM FF and ABD that can be control with tactile cueing. Assess response NV.     Plan: Continue per plan of care.      Precautions: N/a  Dx: RIGHT RC repair, labral repair, bicep tenodesis 25  *PROTOCOL ATTACHED TO CHART    Manuals          Shoulder PROM (w/in protocol precuations)   8 mins 8 mins         Elbow PROM                                       Neuro Re-Ed             Elbow AROM 3# 3x10 5# 3x10 6#  3x10 6#  3x10         Pulleys: FF and ABD 15x ea 15x ea 15x ea 15x ea         Wall slide->lift off 2x10 2x10 2x10 2x10         Supine AAROM flexion             Supine AAROM ER at 0d             Sh flexion 2x10  1# 2x10 2# 3x10 90d 2# 3x10 to 90d         Sh abd  1# 2x10 to 90d 2# 3x10 to 90d 2# 3x10 to 90d         Ther Ex             TB row Blue 3x15 15# cable 3x10 15# cable  15# cable 3x10         TB sh ext Blue 3x10 Blue 3x10 Blue 3x10 Blue 3x10         Wall slides             TB ER at 0d YTB 2x10 YTB 3x10 RTB 3x10 RTB  3x10         TB IR @ 0d YTB 2x10 YTB 3x10 RTB 3x10 RTB  3x10         S/L Sh ER 2x10 2x10 1# 3x10 1#  3x10         Prone row 3# 3x10 5# 3x10 5# 3x10  5#  3x10                      Ther Activity                                       Gait Training                                       Modalities

## 2025-05-27 ENCOUNTER — OFFICE VISIT (OUTPATIENT)
Dept: PHYSICAL THERAPY | Facility: CLINIC | Age: 65
End: 2025-05-27
Payer: COMMERCIAL

## 2025-05-27 DIAGNOSIS — S46.011A TRAUMATIC COMPLETE TEAR OF RIGHT ROTATOR CUFF, INITIAL ENCOUNTER: Primary | ICD-10-CM

## 2025-05-27 PROCEDURE — 97110 THERAPEUTIC EXERCISES: CPT

## 2025-05-27 PROCEDURE — 97112 NEUROMUSCULAR REEDUCATION: CPT

## 2025-05-27 PROCEDURE — 97140 MANUAL THERAPY 1/> REGIONS: CPT

## 2025-05-27 NOTE — PROGRESS NOTES
Daily Note     Today's date: 2025  Patient name: Pavan Jimenez  : 1960  MRN: [id number]  Referring provider: Sasha Randhawa PA-C  Dx:   Encounter Diagnosis     ICD-10-CM    1. Traumatic complete tear of right rotator cuff, initial encounter  S46.011A                      Subjective: States he felt much less sore after last session than he was previously. Has stiffness when waking up but the shoulder feels good throughout the day.       Objective: See treatment diary below      Assessment: Tolerated treatment well. Patient tolerated increased resistance w/ TE. Demonstrated improved form for scapular NM control during posterior chain. Cont to compensate w/ excessive use of upper trap once fatigue sets in, improves w/ inc rest intervals.        Plan: Continue per plan of care.      Precautions: N/a  Dx: RIGHT RC repair, labral repair, bicep tenodesis 25  *PROTOCOL ATTACHED TO CHART    Manuals         Shoulder PROM (w/in protocol precuations)   8 mins 8 mins 10 mins        Elbow PROM                                       Neuro Re-Ed             Elbow AROM 3# 3x10 5# 3x10 6#  3x10 6#  3x10 8# 3x8        Pulleys: FF and ABD 15x ea 15x ea 15x ea 15x ea 15x ea        Wall slide->lift off 2x10 2x10 2x10 2x10 2# 2x10        Sh flexion 2x10  1# 2x10 2# 3x10 90d 2# 3x10 to 90d 2# 3x12 to 90d        Sh abd  1# 2x10 to 90d 2# 3x10 to 90d 2# 3x10 to 90d 2# 3x12 to 90d        Ther Ex             TB row Blue 3x15 15# cable 3x10 15# cable  15# cable 3x10 12.5# cable 3x10        TB sh ext Blue 3x10 Blue 3x10 Blue 3x10 Blue 3x10 Blue 3x10        TB T             Wall slides             TB ER at 0d YTB 2x10 YTB 3x10 RTB 3x10 RTB  3x10 GTB 3x10        TB IR @ 0d YTB 2x10 YTB 3x10 RTB 3x10 RTB  3x10 GTB 3x12        S/L Sh ER 2x10 2x10 1# 3x10 1#  3x10 2# 2x10        Prone row 3# 3x10 5# 3x10 5# 3x10 5#  3x10 5# 3x10                     Ther Activity                                        Gait Training                                       Modalities

## 2025-05-30 ENCOUNTER — OFFICE VISIT (OUTPATIENT)
Dept: PHYSICAL THERAPY | Facility: CLINIC | Age: 65
End: 2025-05-30
Payer: COMMERCIAL

## 2025-05-30 DIAGNOSIS — S46.011A TRAUMATIC COMPLETE TEAR OF RIGHT ROTATOR CUFF, INITIAL ENCOUNTER: Primary | ICD-10-CM

## 2025-05-30 PROCEDURE — 97140 MANUAL THERAPY 1/> REGIONS: CPT

## 2025-05-30 PROCEDURE — 97112 NEUROMUSCULAR REEDUCATION: CPT

## 2025-05-30 PROCEDURE — 97110 THERAPEUTIC EXERCISES: CPT

## 2025-05-30 NOTE — PROGRESS NOTES
Daily Note     Today's date: 2025  Patient name: Pavan Jimenez  : 1960  MRN: [id number]  Referring provider: Sasha Randhawa PA-C  Dx:   Encounter Diagnosis     ICD-10-CM    1. Traumatic complete tear of right rotator cuff, initial encounter  S46.011A             Start Time: 1000  Stop Time: 1050  Total time in clinic (min): 50 minutes    Subjective: Patient reports no pain or soreness after last visit or before therapy.     Objective: See treatment diary below    Assessment: Patient demonstrates improved form during TB activities noting less anterior shoulder translation and better scapular engagement. Still has slight UT compensation during R shoulder AROM abduction.     Plan: Continue per plan of care.      Precautions: N/a  Dx: RIGHT RC repair, labral repair, bicep tenodesis 25  *PROTOCOL ATTACHED TO CHART    Manuals        Shoulder PROM (w/in protocol precuations)   8 mins 8 mins 10 mins 10 mins       Elbow PROM                          Neuro Re-Ed             Elbow AROM 3# 3x10 5# 3x10 6#  3x10 6#  3x10 8# 3x8 8#  3x8       Pulleys: FF and ABD 15x ea 15x ea 15x ea 15x ea 15x ea 15x ea       Wall slide->lift off 2x10 2x10 2x10 2x10 2# 2x10 2#  3x10       Sh flexion 2x10  1# 2x10 2# 3x10 90d 2# 3x10 to 90d 2# 3x12 to 90d 2# 3x12 to 90d       Sh abd  1# 2x10 to 90d 2# 3x10 to 90d 2# 3x10 to 90d 2# 3x12 to 90d 2# 3x12 to 90d                    Ther Ex             TB row Blue 3x15 15# cable 3x10 15# cable  15# cable 3x10 12.5# cable 3x10 15#  Cable  3x10       TB sh ext Blue 3x10 Blue 3x10 Blue 3x10 Blue 3x10 Blue 3x10 Blue  3x10       TB T             Wall slides             TB ER at 0d YTB 2x10 YTB 3x10 RTB 3x10 RTB  3x10 GTB 3x10 GTB  3x10       TB IR @ 0d YTB 2x10 YTB 3x10 RTB 3x10 RTB  3x10 GTB 3x12 GTB  3x12       S/L Sh ER 2x10 2x10 1# 3x10 1#  3x10 2# 2x10 2#  3x10       Prone row 3# 3x10 5# 3x10 5# 3x10 5#  3x10 5# 3x10 5#  3x10                    Ther  Activity                                       Gait Training                                       Modalities

## 2025-06-03 ENCOUNTER — OFFICE VISIT (OUTPATIENT)
Dept: PHYSICAL THERAPY | Facility: CLINIC | Age: 65
End: 2025-06-03
Payer: COMMERCIAL

## 2025-06-03 DIAGNOSIS — S46.011A TRAUMATIC COMPLETE TEAR OF RIGHT ROTATOR CUFF, INITIAL ENCOUNTER: Primary | ICD-10-CM

## 2025-06-03 PROCEDURE — 97110 THERAPEUTIC EXERCISES: CPT

## 2025-06-03 NOTE — PROGRESS NOTES
Daily Note     Today's date: 6/3/2025  Patient name: Pavan Jimenez  : 1960  MRN: [id number]  Referring provider: Sasha Randhawa PA-C  Dx:   Encounter Diagnosis     ICD-10-CM    1. Traumatic complete tear of right rotator cuff, initial encounter  S46.011A           Start Time: 1009  Stop Time: 1032  Total time in clinic (min): 23 minutes    Subjective: Pt states his shoulder is stiff in the morning but not painful or achy.      Objective: See treatment diary below      Assessment: Tolerated treatment well. Patient cont to req cueing for postural and scapular control to minimize anterior GHJ glide during posterior chain strengthening. Pt is quick to fatigue in OH positions has appropriate scapular upward elevation to reach end range.       Plan: Continue per plan of care.      Precautions: N/a  Dx: RIGHT RC repair, labral repair, bicep tenodesis 25  *PROTOCOL ATTACHED TO CHART    Manuals 5/13 5/16 5/20 5/23 5/27 5/30 6/3      Shoulder PROM (w/in protocol precuations)   8 mins 8 mins 10 mins 10 mins       Elbow PROM                          Neuro Re-Ed             Elbow AROM 3# 3x10 5# 3x10 6#  3x10 6#  3x10 8# 3x8 8#  3x8 8# 4x8       Pulleys: FF and ABD 15x ea 15x ea 15x ea 15x ea 15x ea 15x ea 15x ea      Wall slide 2x10 2x10 2x10 2x10 2# 2x10 2#  3x10 2# 3x12      Sh flexion 2x10  1# 2x10 2# 3x10 90d 2# 3x10 to 90d 2# 3x12 to 90d 2# 3x12 to 90d 3# to 90d 3x12      Sh abd  1# 2x10 to 90d 2# 3x10 to 90d 2# 3x10 to 90d 2# 3x12 to 90d 2# 3x12 to 90d 2# to 90d 3x12                   Ther Ex             TB row Blue 3x15 15# cable 3x10 15# cable  15# cable 3x10 12.5# cable 3x10 15#  Cable  3x10 12.5# cable 3x10      TB sh ext Blue 3x10 Blue 3x10 Blue 3x10 Blue 3x10 Blue 3x10 Blue  3x10       TB T       YTB 1x10      TB ER at 0d YTB 2x10 YTB 3x10 RTB 3x10 RTB  3x10 GTB 3x10 GTB  3x10 GTB 3x10      TB IR @ 0d YTB 2x10 YTB 3x10 RTB 3x10 RTB  3x10 GTB 3x12 GTB  3x12 Blue 3x12      S/L Sh ER 2x10 2x10 1#  3x10 1#  3x10 2# 2x10 2#  3x10 2# 3x12      Prone row 3# 3x10 5# 3x10 5# 3x10 5#  3x10 5# 3x10 5#  3x10 5# 3x10      Wide row 2# 2x10                   Ther Activity                                       Gait Training                                       Modalities

## 2025-06-06 ENCOUNTER — TELEPHONE (OUTPATIENT)
Age: 65
End: 2025-06-06

## 2025-06-06 ENCOUNTER — OFFICE VISIT (OUTPATIENT)
Dept: PHYSICAL THERAPY | Facility: CLINIC | Age: 65
End: 2025-06-06
Payer: COMMERCIAL

## 2025-06-06 ENCOUNTER — PREP FOR PROCEDURE (OUTPATIENT)
Age: 65
End: 2025-06-06

## 2025-06-06 DIAGNOSIS — S46.011A TRAUMATIC COMPLETE TEAR OF RIGHT ROTATOR CUFF, INITIAL ENCOUNTER: Primary | ICD-10-CM

## 2025-06-06 DIAGNOSIS — Z12.11 SCREENING FOR COLON CANCER: Primary | ICD-10-CM

## 2025-06-06 PROCEDURE — 97110 THERAPEUTIC EXERCISES: CPT

## 2025-06-06 PROCEDURE — 97112 NEUROMUSCULAR REEDUCATION: CPT

## 2025-06-06 NOTE — PROGRESS NOTES
Daily Note     Today's date: 2025  Patient name: Pavan Jimenez  : 1960  MRN: [id number]  Referring provider: Sasha Randhawa PA-C  Dx:   Encounter Diagnosis     ICD-10-CM    1. Traumatic complete tear of right rotator cuff, initial encounter  S46.011A                      Subjective: States his shoulder feels good. Stiffness present at times but generally improving. He feels weak when in OH positions but not painful.       Objective: See treatment diary below      Assessment: Tolerated treatment well. Patient tolerated all TE progressions, including modified WB introduction. Cueing req for scapular NM control and postural control when using his arm into abd, ER, and OH positions. Incline plank serratus press was challenging and req tactile cueing for form. Cont emphasis on these movement patterns is req.       Plan: Continue per plan of care.      Precautions: N/a  Dx: RIGHT RC repair, labral repair, bicep tenodesis 25  *PROTOCOL ATTACHED TO CHART    Manuals 5/13 5/16 5/20 5/23 5/27 5/30 6/3 6/6     Shoulder PROM (w/in protocol precuations)   8 mins 8 mins 10 mins 10 mins       Elbow PROM                          Neuro Re-Ed             Elbow AROM 3# 3x10 5# 3x10 6#  3x10 6#  3x10 8# 3x8 8#  3x8 8# 4x8       Pulleys: FF and ABD 15x ea 15x ea 15x ea 15x ea 15x ea 15x ea 15x ea 15x ea     Wall slide 2x10 2x10 2x10 2x10 2# 2x10 2#  3x10 2# 3x12 2# 3x12     Sh flexion 2x10  1# 2x10 2# 3x10 90d 2# 3x10 to 90d 2# 3x12 to 90d 2# 3x12 to 90d 3# to 90d 3x12 4# to 90d 3x10     Sh abd  1# 2x10 to 90d 2# 3x10 to 90d 2# 3x10 to 90d 2# 3x12 to 90d 2# 3x12 to 90d 2# to 90d 3x12 3# to 90d 3x10     Incline plank + serratus press        2x10     Ther Ex             TB row Blue 3x15 15# cable 3x10 15# cable  15# cable 3x10 12.5# cable 3x10 15#  Cable  3x10 12.5# cable 3x10 12.5# cable 3x10     TB sh ext Blue 3x10 Blue 3x10 Blue 3x10 Blue 3x10 Blue 3x10 Blue  3x10       TB T       YTB 1x10 RTB 2x10     TB ER at  0d YTB 2x10 YTB 3x10 RTB 3x10 RTB  3x10 GTB 3x10 GTB  3x10 GTB 3x10      TB IR @ 0d YTB 2x10 YTB 3x10 RTB 3x10 RTB  3x10 GTB 3x12 GTB  3x12 Blue 3x12      TB upper cut        YTB 2x5      S/L Sh ER 2x10 2x10 1# 3x10 1#  3x10 2# 2x10 2#  3x10 2# 3x12 2# 3x12     Prone row 3# 3x10 5# 3x10 5# 3x10 5#  3x10 5# 3x10 5#  3x10 5# 3x10      Wide row 2# 2x10 5# 3x12    Wide row 5# 2x10     Prone T        2x10     Prone Y        2x10     Ther Activity                                       Gait Training                                       Modalities

## 2025-06-06 NOTE — TELEPHONE ENCOUNTER
Scheduled date of colonoscopy (as of today): 8-  Physician performing colonoscopy: Dr. Sandy  Location of colonoscopy: [facility]   Bowel prep reviewed with patient: reviewed dulcolax miralax and sent via [employer]   Instructions reviewed with patient by: ryan   Clearances: n/a

## 2025-06-06 NOTE — TELEPHONE ENCOUNTER
06/06/25  Screened by: Libby Ramirez MA    Referring Provider Dr. [email or web address]    Pre- Screening:     There is no height or weight on file to calculate BMI.  Has patient been referred for a routine screening Colonoscopy? yes  Is the patient between 45-75 years old? yes      Previous Colonoscopy yes   If yes:    Date: 7-    Facility: [facility]    Reason:       SCHEDULING STAFF: If the patient is between 45yrs-49yrs, please advise patient to confirm benefits/coverage with their insurance company for a routine screening colonoscopy, some insurance carriers will only cover at 50yrs or older. If the patient is over 75years old, please schedule an office visit.     Does the patient want to see a Gastroenterologist prior to their procedure OR are they having any GI symptoms? no    Has the patient been hospitalized or had abdominal surgery in the past 6 months? no    Does the patient use supplemental oxygen? no    Does the patient take Coumadin, Lovenox, Plavix, Elliquis, Xarelto, or other blood thinning medication? no    Has the patient had a stroke, cardiac event, or stent placed in the past year? no    SCHEDULING STAFF: If patient answers NO to above questions, then schedule procedure. If patient answers YES to above questions, then schedule office appointment.     If patient is between 45yrs - 49yrs, please advise patient that we will have to confirm benefits & coverage with their insurance company for a routine screening colonoscopy.

## 2025-06-06 NOTE — LETTER
COLONOSCOPY  MIRALAX/Dulcolax Bowel Preparation Instructions    The OR/GI Lab will contact you the evening prior to your procedure with your exact arrival time.    Our practice requires a 1 week notice for any cancellations or rescheduling. We kindly ask that you immediately notify us of any changes including any new medications that are prescribed. Thank you for your cooperation.     WEEK BEFORE YOUR PROCEDURE:  Stop taking Iron tablets.  5 days prior, AVOID vegetables and fruits with skins or seeds, nuts, corn, popcorn and whole grain breads.   Purchase: One (1) 238-gram container of Miralax (polyethylene glycol 3350), four (4) 5 mg Dulcolax (bisacodyl) tablets, and one (1) 64-ounce bottle of Gatorade (sports drink) - no red, orange, or purple. These may be purchased at any pharmacy without a prescription. Generic products are permissible.   Arrange responsible transportation for day of the procedure.     DAY BEFORE THE PROCEDURE:   CLEAR liquids only for entire day prior. Nothing red, orange or purple.    You MAY have:                                                               Soda  Water  Broth Gatorade  Jello  Popsicles Coffee/tea without milk/creamer     YOU MAY NOT HAVE:  Solid foods   Milk and milk products    Juice with pulp    BOWEL PREPARATION:  Includes: One (1) 238-gram container of Miralax (polyethylene glycol 3350), four (4) 5 mg Dulcolax (bisacodyl) tablets, and one (1) 64-ounce bottle of Gatorade (sports drink).  Preparation may be refrigerated.  Entire bowel prep should be completed.     Afternoon before the procedure (2:00 pm - 5:00 pm):    Take two (2) 5 mg Dulcolax laxative tablets.     Evening before the procedure (6:00 pm):  Mix entire container of Miralax with one (1) 64-ounce bottle of Gatorade and shake until all medication is dissolved.   Begin drinking solution. Drink an eight (8) ounce cup every 10-15 minutes until you have consumed half (32 ounces) of the solution.   Refrigerate remaining solution.    Night before the procedure (8:00 pm):  Take two (2) 5 mg Dulcolax laxative tablets.     Beginning 5 hours before your procedure:  Drink the remaining amount of prepared solution (32 ounces).  Drink an eight (8) ounce cup every 10-15 minutes until you have consumed the remaining solution.     Bowel prep should be completed 4 hours prior to procedure time.    NOTHING TO EAT OR DRINK AFTER MIDNIGHT- EXCEPT FOR YOUR PREP    DAY OF THE PROCEDURE:  You may brush your teeth.  Leave all jewelry at home.  Please arrive for your procedure as indicated by the OR / GI Lab / Endoscopy Unit. The hospital will contact you the day before with your exact arrival time.   Make sure you have arranged ahead of time for a responsible adult (18 or older) to accompany and drive you home after the procedure.  Please discuss any transportation concerns with our staff prior to your procedure.    The effects of the anesthesia can persist for 24 hours.  After receiving the sedation, you must exercise caution before engaging in any activity that could harm yourself and others (such as driving a car).  Do not make any important decisions or do not drink any alcoholic beverages during this time period.  After your procedure, you may have anything you'd like to eat or drink.  You will probably want to start with something light.  Please include plenty of fluids.  Avoid items that cause gas such as sodas and salads.    SPECIAL INSTRUCTIONS:    For patients currently taking blood thinners and/or antiplatelet therapy our office will contact the prescribing provider.  Our office will contact you with any required changes to your medication regimen.     Blood thinner (i.e. - Coumadin, Pradaxa, Lovenox, Xarelto, Eliquis)  ?  Continue (Do Not Stop)  ? Stop______________for_____________days prior to the procedure.    Antiplatelet (i.e. - Plavix, Aggrenox, Effient, Brilinta)  ?  Continue (Do Not  Stop)  ? Stop______________for_____________days prior to the procedure.       Diabetes:   If you are Diabetic, please see separate Diabetic Instruction Sheet.          Prescribed medications:  Do not stop your aspirin, or any of your other medications (unless instructed otherwise).    Take the rest of your prescribed medications with small sips of water at least 2 hours prior to your procedure.      For any questions or concerns related to your bowel preparation or pre-procedure instructions, please contact our office at [phone].  Thank you for choosing [facility]!

## 2025-06-09 ENCOUNTER — OFFICE VISIT (OUTPATIENT)
Dept: PHYSICAL THERAPY | Facility: CLINIC | Age: 65
End: 2025-06-09
Payer: COMMERCIAL

## 2025-06-09 DIAGNOSIS — S46.011A TRAUMATIC COMPLETE TEAR OF RIGHT ROTATOR CUFF, INITIAL ENCOUNTER: Primary | ICD-10-CM

## 2025-06-09 PROCEDURE — 97110 THERAPEUTIC EXERCISES: CPT

## 2025-06-09 PROCEDURE — 97112 NEUROMUSCULAR REEDUCATION: CPT

## 2025-06-09 NOTE — HOME EXERCISE EDUCATION
Program_ID:[id number]   Access Code: [email or web address]  URL: [email or web address]  Date: 06-  Prepared By: Liliana Razo    Program Notes      Exercises      - Prone Shoulder Row - 1 x daily - 7 x weekly - 3 sets - 12 reps      - Prone Single Arm Shoulder Horizontal Abduction with Dumbbell - Palm Down - 1 x daily - 7 x weekly - 3 sets - 10 reps      - Sidelying Shoulder External Rotation Dumbbell - 1 x daily - 7 x weekly - 3 sets - 12 reps      - Seated Shoulder Abduction with Dumbbells - Palms Down - 1 x daily - 7 x weekly - 2 sets - 10 reps      - Shoulder Overhead Press in Flexion with Dumbbells - 1 x daily - 7 x weekly - 2 sets - 10 reps

## 2025-06-09 NOTE — PROGRESS NOTES
Daily Note     Today's date: 2025  Patient name: Pavan Jimenez  : 1960  MRN: [id number]  Referring provider: Sasha Randhawa PA-C  Dx:   Encounter Diagnosis     ICD-10-CM    1. Traumatic complete tear of right rotator cuff, initial encounter  S46.011A                      Subjective: Pt states that his shoulder is doing good. Gets most fatigued OH.      Objective: See treatment diary below      Assessment: Tolerated treatment well. Patient req cueing intermittently to limit compensation from upper trap. Carryover of postural control intro later sets is improving.       Plan: Continue per plan of care.      Precautions: N/a  Dx: RIGHT RC repair, labral repair, bicep tenodesis 25  *PROTOCOL ATTACHED TO CHART    Manuals 5/13 5/16 5/20 5/23 5/27 5/30 6/3 6/6 6/9    Shoulder PROM (w/in protocol precuations)   8 mins 8 mins 10 mins 10 mins       Elbow PROM                          Neuro Re-Ed             Elbow AROM 3# 3x10 5# 3x10 6#  3x10 6#  3x10 8# 3x8 8#  3x8 8# 4x8       Pulleys: FF and ABD 15x ea 15x ea 15x ea 15x ea 15x ea 15x ea 15x ea 15x ea     Wall slide 2x10 2x10 2x10 2x10 2# 2x10 2#  3x10 2# 3x12 2# 3x12 2.5# 3x10    Sh flexion 2x10  1# 2x10 2# 3x10 90d 2# 3x10 to 90d 2# 3x12 to 90d 2# 3x12 to 90d 3# to 90d 3x12 4# to 90d 3x10 4# to 90d 3x10    Sh abd  1# 2x10 to 90d 2# 3x10 to 90d 2# 3x10 to 90d 2# 3x12 to 90d 2# 3x12 to 90d 2# to 90d 3x12 3# to 90d 3x10 3# 3x10     Incline plank + serratus press        2x10     Ther Ex             TB row Blue 3x15 15# cable 3x10 15# cable  15# cable 3x10 12.5# cable 3x10 15#  Cable  3x10 12.5# cable 3x10 12.5# cable 3x10 15# 3x10    TB sh ext Blue 3x10 Blue 3x10 Blue 3x10 Blue 3x10 Blue 3x10 Blue  3x10       TB T       YTB 1x10 RTB 2x10 RTB 2x10    TB ER at 0d YTB 2x10 YTB 3x10 RTB 3x10 RTB  3x10 GTB 3x10 GTB  3x10 GTB 3x10  Blue 3x12    TB IR @ 0d YTB 2x10 YTB 3x10 RTB 3x10 RTB  3x10 GTB 3x12 GTB  3x12 Blue 3x12  Blue 3x12    TB upper cut         YTB 2x5  YTB 2x5    S/L Sh ER 2x10 2x10 1# 3x10 1#  3x10 2# 2x10 2#  3x10 2# 3x12 2# 3x12 3# 3x10    Prone row 3# 3x10 5# 3x10 5# 3x10 5#  3x10 5# 3x10 5#  3x10 5# 3x10      Wide row 2# 2x10 5# 3x12    Wide row 5# 2x10 5# 3x12    Wide row 5# 3x10    Prone T        2x10 1# 2x10    Prone Y        2x10 2x10    Ther Activity                                       Gait Training                                       Modalities

## 2025-06-10 ENCOUNTER — APPOINTMENT (OUTPATIENT)
Dept: PHYSICAL THERAPY | Facility: CLINIC | Age: 65
End: 2025-06-10
Payer: COMMERCIAL

## 2025-06-10 ENCOUNTER — OFFICE VISIT (OUTPATIENT)
Age: 65
End: 2025-06-10
Payer: COMMERCIAL

## 2025-06-10 DIAGNOSIS — S46.011A TRAUMATIC COMPLETE TEAR OF RIGHT ROTATOR CUFF, INITIAL ENCOUNTER: Primary | ICD-10-CM

## 2025-06-10 PROCEDURE — 99213 OFFICE O/P EST LOW 20 MIN: CPT | Performed by: ORTHOPAEDIC SURGERY

## 2025-06-10 NOTE — PROGRESS NOTES
Assessment & Plan  Traumatic complete tear of right rotator cuff, initial encounter         AIP:  S/P surgery on 2/19/25  1. RIGHT shoulder arthroscopic rotator cuff repair (61410)  2. Arthroscopic biceps tendon tenodesis (33772)  3. Arthroscopic anterior labral repair (67409)  4. Arthroscopic extensive debridement of glenohumeral joint (01228)  5. Arthroscopic subacromial decompression with formal acromioplasty (72663)    Progressing well  Continue PT  Continue home exercises   Discussed activities  Follow up 4 months        S:  Doing well, no pain. Doing PT and home exercises.     Exam:  Incision c/d/i   , ER 60, IR L5  Abd 4+/5  ER 5/5   SILT ax/r/m/u  Motor intact ax/r/m/u   Hand wwp

## 2025-06-11 ENCOUNTER — APPOINTMENT (OUTPATIENT)
Dept: PHYSICAL THERAPY | Facility: CLINIC | Age: 65
End: 2025-06-11
Payer: COMMERCIAL

## 2025-06-12 ENCOUNTER — APPOINTMENT (OUTPATIENT)
Dept: PHYSICAL THERAPY | Facility: CLINIC | Age: 65
End: 2025-06-12
Payer: COMMERCIAL

## 2025-06-17 ENCOUNTER — OFFICE VISIT (OUTPATIENT)
Dept: PHYSICAL THERAPY | Facility: CLINIC | Age: 65
End: 2025-06-17
Payer: COMMERCIAL

## 2025-06-17 DIAGNOSIS — S46.011A TRAUMATIC COMPLETE TEAR OF RIGHT ROTATOR CUFF, INITIAL ENCOUNTER: Primary | ICD-10-CM

## 2025-06-17 PROCEDURE — 97110 THERAPEUTIC EXERCISES: CPT

## 2025-06-17 NOTE — PROGRESS NOTES
Daily Note     Today's date: 2025  Patient name: Pavan Jimenez  : 1960  MRN: [id number]  Referring provider: Sasha Randhawa PA-C  Dx:   Encounter Diagnosis     ICD-10-CM    1. Traumatic complete tear of right rotator cuff, initial encounter  S46.011A                      Subjective: Pt had f/u with surgeon last week and they were happy with his progress. Cleared him to bike and swim but to just be smart with situations he puts himself in to limit any risk of falls.       Objective: See treatment diary below      Assessment: Tolerated treatment well. Patient req minimal cueing for TE form w/ movements progressing to OH.      Plan: Continue per plan of care.      Precautions: N/a  Dx: RIGHT RC repair, labral repair, bicep tenodesis 25  *PROTOCOL ATTACHED TO CHART    Manuals 5/13 5/16 5/20 5/23 5/27 5/30 6/3 6/6 6/9 6/17   Shoulder PROM (w/in protocol precuations)   8 mins 8 mins 10 mins 10 mins       Elbow PROM                          Neuro Re-Ed             Elbow AROM 3# 3x10 5# 3x10 6#  3x10 6#  3x10 8# 3x8 8#  3x8 8# 4x8       Pulleys: FF and ABD 15x ea 15x ea 15x ea 15x ea 15x ea 15x ea 15x ea 15x ea     Wall slide 2x10 2x10 2x10 2x10 2# 2x10 2#  3x10 2# 3x12 2# 3x12 2.5# 3x10 3# 3x10   Sh flexion 2x10  1# 2x10 2# 3x10 90d 2# 3x10 to 90d 2# 3x12 to 90d 2# 3x12 to 90d 3# to 90d 3x12 4# to 90d 3x10 4# to 90d 3x10    Sh abd  1# 2x10 to 90d 2# 3x10 to 90d 2# 3x10 to 90d 2# 3x12 to 90d 2# 3x12 to 90d 2# to 90d 3x12 3# to 90d 3x10 3# 3x10     Incline plank + serratus press        2x10     Ther Ex             TB row Blue 3x15 15# cable 3x10 15# cable  15# cable 3x10 12.5# cable 3x10 15#  Cable  3x10 12.5# cable 3x10 12.5# cable 3x10 15# 3x10 15# 3x10   TB sh ext Blue 3x10 Blue 3x10 Blue 3x10 Blue 3x10 Blue 3x10 Blue  3x10       TB T       YTB 1x10 RTB 2x10 RTB 2x10 GTB 3x10   TB Y          YTB 3x5   TB ER at 0d YTB 2x10 YTB 3x10 RTB 3x10 RTB  3x10 GTB 3x10 GTB  3x10 GTB 3x10  Blue 3x12    TB  IR @ 0d YTB 2x10 YTB 3x10 RTB 3x10 RTB  3x10 GTB 3x12 GTB  3x12 Blue 3x12  Blue 3x12    TB upper cut        YTB 2x5  YTB 2x5 YTB 2x10   S/L Sh ER 2x10 2x10 1# 3x10 1#  3x10 2# 2x10 2#  3x10 2# 3x12 2# 3x12 3# 3x10 4# 3x10   Prone row 3# 3x10 5# 3x10 5# 3x10 5#  3x10 5# 3x10 5#  3x10 5# 3x10      Wide row 2# 2x10 5# 3x12    Wide row 5# 2x10 7# 3x12    Wide row 7# 3x10 10# 3x10    Wide 10# 3x10   Prone T        2x10 1# 2x10 2# 3x10   Prone Y        2x10 2x10 2# 2x10   DB OH press          3# 2x10   Ther Activity                                       Gait Training                                       Modalities

## 2025-06-20 ENCOUNTER — OFFICE VISIT (OUTPATIENT)
Dept: PHYSICAL THERAPY | Facility: CLINIC | Age: 65
End: 2025-06-20
Payer: COMMERCIAL

## 2025-06-20 DIAGNOSIS — S46.011A TRAUMATIC COMPLETE TEAR OF RIGHT ROTATOR CUFF, INITIAL ENCOUNTER: Primary | ICD-10-CM

## 2025-06-20 PROCEDURE — 97112 NEUROMUSCULAR REEDUCATION: CPT

## 2025-06-20 PROCEDURE — 97110 THERAPEUTIC EXERCISES: CPT

## 2025-06-20 NOTE — PROGRESS NOTES
"Daily Note     Today's date: 2025  Patient name: Pavan Jimenez  : 1960  MRN: [id number]  Referring provider: Sasha Randhawa PA-C  Dx:   Encounter Diagnosis     ICD-10-CM    1. Traumatic complete tear of right rotator cuff, initial encounter  S46.011A                      Subjective: No change or new complaints since last visit.       Objective: See treatment diary below      Assessment: Session focused on initiating 90/90 positions at the R shooulder. Pt had limited ROM in this range but no adverse reaction to stretching or AROM/strength TE. Advised to begin doing wall stretch at home for this movement to assist in improvement of OH tolerance to loading and stability.       Plan: Continue per plan of care.      Precautions: N/a  Dx: RIGHT RC repair, labral repair, bicep tenodesis 25  *PROTOCOL ATTACHED TO CHART    Manuals 6/20    5/27 5/30 6/3 6/6 6/9 6/17   Shoulder PROM (w/in protocol precuations)     10 mins 10 mins       Elbow PROM                          Neuro Re-Ed             90/90  ER wall stretch 5x10\"            90/90 ER wall lift offs  1x10            Wall slide 3# 3x10      2# 2x10 2#  3x10 2# 3x12 2# 3x12 2.5# 3x10 3# 3x10   Sh flexion     2# 3x12 to 90d 2# 3x12 to 90d 3# to 90d 3x12 4# to 90d 3x10 4# to 90d 3x10    Sh abd     2# 3x12 to 90d 2# 3x12 to 90d 2# to 90d 3x12 3# to 90d 3x10 3# 3x10     Plank shoulder taps Low mat 2x10             Ther Ex             TB row     12.5# cable 3x10 15#  Cable  3x10 12.5# cable 3x10 12.5# cable 3x10 15# 3x10 15# 3x10   TB sh ext     Blue 3x10 Blue  3x10       TB T GTB 2x10      YTB 1x10 RTB 2x10 RTB 2x10 GTB 3x10   TB Y GTB 2x10         YTB 3x5   TB ER at 0d     GTB 3x10 GTB  3x10 GTB 3x10  Blue 3x12    TB IR @ 0d     GTB 3x12 GTB  3x12 Blue 3x12  Blue 3x12    TB upper cut        YTB 2x5  YTB 2x5 YTB 2x10   S/L Sh ER 4# 3x10    2# 2x10 2#  3x10 2# 3x12 2# 3x12 3# 3x10 4# 3x10   Prone row 10# 3x12    Wide 10# 3x12    5# 3x10 5#  3x10 5# " 3x10      Wide row 2# 2x10 5# 3x12    Wide row 5# 2x10 7# 3x12    Wide row 7# 3x10 10# 3x10    Wide 10# 3x10   Prone T 3# 3x10       2x10 1# 2x10 2# 3x10   Prone Y 3# 3x10       2x10 2x10 2# 2x10   DB OH press 3# 3x8         3# 2x10   Push up Table 2x5 to neutral            Ther Activity                                       Gait Training                                       Modalities

## 2025-06-24 ENCOUNTER — OFFICE VISIT (OUTPATIENT)
Dept: PHYSICAL THERAPY | Facility: CLINIC | Age: 65
End: 2025-06-24
Payer: COMMERCIAL

## 2025-06-24 DIAGNOSIS — S46.011A TRAUMATIC COMPLETE TEAR OF RIGHT ROTATOR CUFF, INITIAL ENCOUNTER: Primary | ICD-10-CM

## 2025-06-24 PROCEDURE — 97110 THERAPEUTIC EXERCISES: CPT

## 2025-06-24 PROCEDURE — 97112 NEUROMUSCULAR REEDUCATION: CPT

## 2025-06-24 NOTE — PROGRESS NOTES
"Daily Note     Today's date: 2025  Patient name: Pavan Jimenez  : 1960  MRN: [id number]  Referring provider: Sasha Randhawa PA-C  Dx:   Encounter Diagnosis     ICD-10-CM    1. Traumatic complete tear of right rotator cuff, initial encounter  S46.011A           Start Time: 1000  Stop Time: 1045  Total time in clinic (min): 45 minutes    Subjective: Patient reports soreness following last visit that only lasted an hour if that. No complaints to offer prior to therapy.      Objective: See treatment diary below    Assessment: Patient demonstrates moderate fatigue with RTC strengthening. He demonstrates limited AROM ER at 90° of shoulder abduction. Continued to focus on OH ER strength and neuromuscular control.     Plan: Continue per plan of care.      Precautions: N/a  Dx: RIGHT RC repair, labral repair, bicep tenodesis 25  *PROTOCOL ATTACHED TO CHART    Manuals            Shoulder PROM (w/in protocol precuations)             Elbow PROM                          Neuro Re-Ed             90/90  ER wall stretch 5x10\" 5x10\"           90/90 ER wall lift offs  1x10 1x10           Wall slide 3# 3x10   3#  3x10           Sh flexion             Sh abd             Plank shoulder taps Low mat 2x10  Low mat 2x10            Shoulder 90/90   Flexion to abduction  5# KB  1x5    3#  1x5 1-2#          Seated 90/90 in abduction  ER AROM  2x10                        Ther Ex             TB row             TB sh ext             TB T GTB 2x10 GTB 2x10           TB Y GTB 2x10 GTB 2x10           TB ER at 0d             TB IR @ 0d             TB upper cut             S/L Sh ER 4# 3x10 4# 3x10           Prone row 10# 3x12    Wide 10# 3x12 10# 3x12    Wide 10# 3x12           Prone T 3# 3x10 3#   3x10           Prone Y 3# 3x10 3#   3x10           DB OH press 3# 3x8 3#   3x8           Push up Table 2x5 to neutral Table 2x5 to neutral                        Ther Activity                                       Gait " Training                                       Modalities

## 2025-06-27 ENCOUNTER — APPOINTMENT (OUTPATIENT)
Dept: PHYSICAL THERAPY | Facility: CLINIC | Age: 65
End: 2025-06-27
Payer: COMMERCIAL

## 2025-07-01 ENCOUNTER — OFFICE VISIT (OUTPATIENT)
Dept: PHYSICAL THERAPY | Facility: CLINIC | Age: 65
End: 2025-07-01
Payer: COMMERCIAL

## 2025-07-01 DIAGNOSIS — S46.011A TRAUMATIC COMPLETE TEAR OF RIGHT ROTATOR CUFF, INITIAL ENCOUNTER: Primary | ICD-10-CM

## 2025-07-01 PROCEDURE — 97110 THERAPEUTIC EXERCISES: CPT

## 2025-07-01 PROCEDURE — 97112 NEUROMUSCULAR REEDUCATION: CPT

## 2025-07-01 NOTE — PROGRESS NOTES
"Daily Note     Today's date: 2025  Patient name: Pavan Jimenez  : 1960  MRN: [id number]  Referring provider: Sasha Randhawa PA-C  Dx:   Encounter Diagnosis     ICD-10-CM    1. Traumatic complete tear of right rotator cuff, initial encounter  S46.011A                      Subjective: Pt states his shoulder is doing well. No change in symptoms.      Objective: See treatment diary below      Assessment: Session focused on 90/90 positions and AROM on the R. Pt req extensive cueing to maintain these positions and is quick to fatigue. No adverse reaction or pain noted at the shoulder through any TE prescribed today.       Plan: Continue per plan of care.      Precautions: N/a  Dx: RIGHT RC repair, labral repair, bicep tenodesis 25  *PROTOCOL ATTACHED TO CHART    Manuals           Shoulder PROM (w/in protocol precuations)             Elbow PROM                          Neuro Re-Ed             /90  ER wall stretch 5x10\" 5x10\" 5x20\"          90/90 ER wall lift offs  1x10 1x10 1x10 (cue elbow into wall to initiate movement)          Wall slide 3# 3x10   3#  3x10 4# 3x10          Sh flexion             Sh abd             Plank shoulder taps Low mat 2x10  Low mat 2x10  Lot mat 3x10           Shoulder 90/90   Flexion to abduction  5# KB  1x5    3#  1x5 D/c d/t LBP          Seated 90/90 in abduction  ER AROM  2x10 2x10                       Ther Ex             TB T GTB 2x10 GTB 2x10 GTB 2x10          TB Y GTB 2x10 GTB 2x10 GTB 2x10          S/L Sh ER 4# 3x10 4# 3x10 4# 3x12          Prone row 10# 3x12    Wide 10# 3x12 10# 3x12    Wide 10# 3x12 10# 3x12    Wide 10# 3x12          Prone T 3# 3x10 3#   3x10 3# 3x10          Prone Y 3# 3x10 3#   3x10 3# 3x10          DB OH press 3# 3x8 3#   3x8 Seated 3# 3x8          Push up Table 2x5 to neutral Table 2x5 to neutral Table 3x5 to neutral                       Ther Activity                                       Gait Training                       "                 Modalities

## 2025-07-01 NOTE — LETTER
2025    Sasha Randhawa PA-C  [address]  [address]  [facility] [address]    Patient: Pavan Jimenez   YOB: 1960   Date of Visit: 2025     Encounter Diagnosis     ICD-10-CM    1. Traumatic complete tear of right rotator cuff, initial encounter  S46.011A           Dear Dr. Sasha Randhawa PA-C:    Thank you for your recent referral of Pavan Jimenez. Please review the attached evaluation summary from Pavan's recent visit.     Please verify that you agree with the plan of care by signing the attached order.     If you have any questions or concerns, please do not hesitate to call.     I sincerely appreciate the opportunity to share in the care of one of your patients and hope to have another opportunity to work with you in the near future.       Sincerely,    Liliana Razo, PT      Referring Provider:      I certify that I have read the below Plan of Care and certify the need for these services furnished under this plan of treatment while under my care.                    Sasha Randhawa PA-C  [address]  [address]  [facility] [address]  Via In Basket          Daily Note     Today's date: 2025  Patient name: Pavan Jimenez  : 1960  MRN: [id number]  Referring provider: Sasha Randhawa PA-C  Dx:   Encounter Diagnosis     ICD-10-CM    1. Traumatic complete tear of right rotator cuff, initial encounter  S46.011A                      Subjective: Pt states his shoulder is doing well. No change in symptoms.      Objective: See treatment diary below      Assessment: Session focused on 90/90 positions and AROM on the R. Pt req extensive cueing to maintain these positions and is quick to fatigue. No adverse reaction or pain noted at the shoulder through any TE prescribed today.       Plan: Continue per plan of care.      Precautions: N/a  Dx: RIGHT RC repair, labral repair, bicep tenodesis 25  *PROTOCOL ATTACHED TO CHART    Manuals          "  Shoulder PROM (w/in protocol precuations)             Elbow PROM                          Neuro Re-Ed             90/90  ER wall stretch 5x10\" 5x10\" 5x20\"          90/90 ER wall lift offs  1x10 1x10 1x10 (cue elbow into wall to initiate movement)          Wall slide 3# 3x10   3#  3x10 4# 3x10          Sh flexion             Sh abd             Plank shoulder taps Low mat 2x10  Low mat 2x10  Lot mat 3x10           Shoulder 90/90   Flexion to abduction  5# KB  1x5    3#  1x5 D/c d/t LBP          Seated 90/90 in abduction  ER AROM  2x10 2x10                       Ther Ex             TB T GTB 2x10 GTB 2x10 GTB 2x10          TB Y GTB 2x10 GTB 2x10 GTB 2x10          S/L Sh ER 4# 3x10 4# 3x10 4# 3x12          Prone row 10# 3x12    Wide 10# 3x12 10# 3x12    Wide 10# 3x12 10# 3x12    Wide 10# 3x12          Prone T 3# 3x10 3#   3x10 3# 3x10          Prone Y 3# 3x10 3#   3x10 3# 3x10          DB OH press 3# 3x8 3#   3x8 Seated 3# 3x8          Push up Table 2x5 to neutral Table 2x5 to neutral Table 3x5 to neutral                       Ther Activity                                       Gait Training                                       Modalities                                                            "

## 2025-07-08 ENCOUNTER — OFFICE VISIT (OUTPATIENT)
Dept: PHYSICAL THERAPY | Facility: CLINIC | Age: 65
End: 2025-07-08
Payer: COMMERCIAL

## 2025-07-08 DIAGNOSIS — S46.011A TRAUMATIC COMPLETE TEAR OF RIGHT ROTATOR CUFF, INITIAL ENCOUNTER: Primary | ICD-10-CM

## 2025-07-08 PROCEDURE — 97112 NEUROMUSCULAR REEDUCATION: CPT

## 2025-07-08 PROCEDURE — 97110 THERAPEUTIC EXERCISES: CPT

## 2025-07-08 NOTE — PROGRESS NOTES
"Daily Note     Today's date: 2025  Patient name: Pavan Jimenez  : 1960  MRN: [id number]  Referring provider: Sasha Randhawa PA-C  Dx:   Encounter Diagnosis     ICD-10-CM    1. Traumatic complete tear of right rotator cuff, initial encounter  S46.011A                      Subjective: States his shoulder is still tight in 90/90 position but feels like OH endurance is improving.      Objective: See treatment diary below      Assessment: Session continued to focus on 90/90 shoulder ER AROM and activation in addition to OH endurance and stability. Pt req minimal cueing throughout session to improve form and once fatigued in OH positions, his shoulder cannot maintain scapular plane and RC co-activation.       Plan: Continue per plan of care.      Precautions: N/a  Dx: RIGHT RC repair, labral repair, bicep tenodesis 25  *PROTOCOL ATTACHED TO CHART    Manuals          Shoulder PROM (w/in protocol precuations)             Elbow PROM                          Neuro Re-Ed               ER wall stretch 5x10\" 5x10\" 5x20\" 5x20\"         90/90 ER wall lift offs  1x10 1x10 1x10 (cue elbow into wall to initiate movement) 1x10 (cue elbow into wall to initiate movement)         Wall slide 3# 3x10   3#  3x10 4# 3x10 4# 3x10         Plank shoulder taps Low mat 2x10  Low mat 2x10  Low mat 3x10           Shoulder 90/90   Flexion to abduction  5# KB  1x5    3#  1x5 D/c d/t LBP          Seated 90/90 in abduction  ER AROM  2x10 2x10 3x10         Cane Sh flexion cone taps    3x10          Ther Ex             TB T GTB 2x10 GTB 2x10 GTB 2x10 GTB 3x10         TB Y GTB 2x10 GTB 2x10 GTB 2x10 GTB 3x10         S/L Sh ER 4# 3x10 4# 3x10 4# 3x12 4# 3x12         Prone row 10# 3x12    Wide 10# 3x12 10# 3x12    Wide 10# 3x12 10# 3x12    Wide 10# 3x12          Prone T 3# 3x10 3#   3x10 3# 3x10 3# 3x10         Prone Y 3# 3x10 3#   3x10 3# 3x10 3# 3x10         DB OH press 3# 3x8 3#   3x8 Seated 3# 3x8 Seated 3# " 3x8         Push up Table 2x5 to neutral Table 2x5 to neutral Table 3x5 to neutral Table 3x5 to neutral                      Ther Activity                                       Gait Training                                       Modalities

## 2025-07-15 ENCOUNTER — APPOINTMENT (OUTPATIENT)
Dept: PHYSICAL THERAPY | Facility: CLINIC | Age: 65
End: 2025-07-15
Payer: COMMERCIAL

## 2025-07-29 ENCOUNTER — OFFICE VISIT (OUTPATIENT)
Dept: PHYSICAL THERAPY | Facility: CLINIC | Age: 65
End: 2025-07-29
Payer: COMMERCIAL

## 2025-07-29 DIAGNOSIS — S46.011A TRAUMATIC COMPLETE TEAR OF RIGHT ROTATOR CUFF, INITIAL ENCOUNTER: Primary | ICD-10-CM

## 2025-07-29 PROCEDURE — 97535 SELF CARE MNGMENT TRAINING: CPT

## 2025-07-29 PROCEDURE — 97112 NEUROMUSCULAR REEDUCATION: CPT

## 2025-07-29 PROCEDURE — 97110 THERAPEUTIC EXERCISES: CPT

## 2025-08-06 ENCOUNTER — TELEPHONE (OUTPATIENT)
Dept: OBGYN CLINIC | Facility: HOSPITAL | Age: 65
End: 2025-08-06

## (undated) DEVICE — GLOVE INDICATOR PI UNDERGLOVE SZ 7 BLUE

## (undated) DEVICE — KIT STABILIZATION SHOULDER MARCO

## (undated) DEVICE — PACK PBDS SHOULDER ARTHROSCOPY RF

## (undated) DEVICE — NEEDLE SCORPION KNEE

## (undated) DEVICE — GLOVE INDICATOR PI UNDERGLOVE SZ 6.5 BLUE

## (undated) DEVICE — SUT MONOCRYL 4-0 PS-2 27 IN Y426H

## (undated) DEVICE — TUBING SUCTION 5MM X 12 FT

## (undated) DEVICE — SUT MONOCRYL 3-0 PS-2 27 IN Y427H

## (undated) DEVICE — PUDDLE VAC

## (undated) DEVICE — PROBE ABLATION  APOLLO RF 90 DEG MULTI PORT

## (undated) DEVICE — TUBING ARTHROSCOPY DUALWAVE OUTFLOW TUBE SET

## (undated) DEVICE — BLADE SHAVER EXCALIBUR 4MM 13CM COOLCUT

## (undated) DEVICE — CANNULA 5.75 X 70MM BARREL SHAPED BOWL

## (undated) DEVICE — POSITIONER TRIMANO LIMB BEACH CHAIR

## (undated) DEVICE — NEEDLE SUT SCORPION MEGALOADER

## (undated) DEVICE — COBAN 6 IN STERILE

## (undated) DEVICE — CANNULA 7 X70MM THRD SEAL SIDE PORT

## (undated) DEVICE — SLEEVE SUSPENSION SHOULDER STAR LAT TRAC VELCRO STRAP

## (undated) DEVICE — STERILE POLYISOPRENE POWDER-FREE SURGICAL GLOVES WITH EMOLLIENT COATING: Brand: PROTEXIS

## (undated) DEVICE — 3M™ STERI-DRAPE™ U-DRAPE 1015: Brand: STERI-DRAPE™

## (undated) DEVICE — INTENDED FOR TISSUE SEPARATION, AND OTHER PROCEDURES THAT REQUIRE A SHARP SURGICAL BLADE TO PUNCTURE OR CUT.: Brand: BARD-PARKER ® CARBON RIB-BACK BLADES

## (undated) DEVICE — KIT DISP FIBERTAK CURVED SPEAR

## (undated) DEVICE — MAT ABSORBANT ARTHROSCOPY FLOOR 46 X 40 IN

## (undated) DEVICE — GLOVE INDICATOR PI UNDERGLOVE SZ 8 BLUE

## (undated) DEVICE — STIRRUP STRAP ADULT DISP

## (undated) DEVICE — GLOVE SRG BIOGEL 8.5

## (undated) DEVICE — GLOVE SRG BIOGEL 8

## (undated) DEVICE — GLOVE SRG BIOGEL 6.5

## (undated) DEVICE — 3M™ STERI-STRIP™ REINFORCED ADHESIVE SKIN CLOSURES, R1547, 1/2 IN X 4 IN (12 MM X 100 MM), 6 STRIPS/ENVELOPE: Brand: 3M™ STERI-STRIP™

## (undated) DEVICE — TUBING ARTHROSCOPIC WAVE  MAIN PUMP

## (undated) DEVICE — Device

## (undated) DEVICE — T-MAX DISPOSABLE FACE MASK 8 PER BOX

## (undated) DEVICE — BLADE SHAVER DISSECTOR 4MM 13CM COOLCUT